# Patient Record
Sex: FEMALE | Race: WHITE | Employment: FULL TIME | URBAN - METROPOLITAN AREA
[De-identification: names, ages, dates, MRNs, and addresses within clinical notes are randomized per-mention and may not be internally consistent; named-entity substitution may affect disease eponyms.]

---

## 2017-01-17 PROBLEM — M19.049 CMC ARTHRITIS: Status: ACTIVE | Noted: 2017-01-17

## 2017-01-18 PROBLEM — M19.049 CMC DJD(CARPOMETACARPAL DEGENERATIVE JOINT DISEASE), LOCALIZED PRIMARY: Status: ACTIVE | Noted: 2017-01-18

## 2017-09-08 PROBLEM — G89.29 CHRONIC PAIN: Status: ACTIVE | Noted: 2017-09-08

## 2018-07-09 ENCOUNTER — HOSPITAL ENCOUNTER (OUTPATIENT)
Dept: GENERAL RADIOLOGY | Age: 53
Discharge: HOME | End: 2018-07-11
Payer: MEDICAID

## 2018-07-09 ENCOUNTER — HOSPITAL ENCOUNTER (OUTPATIENT)
Age: 53
Discharge: HOME | End: 2018-07-11
Payer: MEDICAID

## 2018-07-09 DIAGNOSIS — M25.512 LEFT SHOULDER PAIN, UNSPECIFIED CHRONICITY: ICD-10-CM

## 2018-07-09 PROCEDURE — 73030 X-RAY EXAM OF SHOULDER: CPT

## 2018-08-07 ENCOUNTER — HOSPITAL ENCOUNTER (OUTPATIENT)
Dept: GENERAL RADIOLOGY | Age: 53
Discharge: HOME | End: 2018-08-09
Payer: MEDICAID

## 2018-08-07 ENCOUNTER — HOSPITAL ENCOUNTER (OUTPATIENT)
Age: 53
Discharge: HOME | End: 2018-08-09
Payer: MEDICAID

## 2018-08-07 DIAGNOSIS — M25.541 ARTHRALGIA OF HAND, RIGHT: ICD-10-CM

## 2018-08-07 PROCEDURE — 73110 X-RAY EXAM OF WRIST: CPT

## 2018-08-07 PROCEDURE — 73130 X-RAY EXAM OF HAND: CPT

## 2018-08-16 ENCOUNTER — OFFICE VISIT (OUTPATIENT)
Dept: ORTHOPEDIC SURGERY | Age: 53
End: 2018-08-16
Payer: MEDICAID

## 2018-08-16 VITALS — WEIGHT: 166 LBS | BODY MASS INDEX: 29.41 KG/M2 | HEIGHT: 63 IN

## 2018-08-16 DIAGNOSIS — M19.031 PRIMARY OSTEOARTHRITIS OF RIGHT WRIST: Primary | ICD-10-CM

## 2018-08-16 DIAGNOSIS — M77.8 TENDONITIS OF WRIST, RIGHT: ICD-10-CM

## 2018-08-16 PROCEDURE — 1036F TOBACCO NON-USER: CPT | Performed by: ORTHOPAEDIC SURGERY

## 2018-08-16 PROCEDURE — 99214 OFFICE O/P EST MOD 30 MIN: CPT | Performed by: ORTHOPAEDIC SURGERY

## 2018-08-16 PROCEDURE — 3017F COLORECTAL CA SCREEN DOC REV: CPT | Performed by: ORTHOPAEDIC SURGERY

## 2018-08-16 PROCEDURE — G8427 DOCREV CUR MEDS BY ELIG CLIN: HCPCS | Performed by: ORTHOPAEDIC SURGERY

## 2018-08-16 PROCEDURE — G8417 CALC BMI ABV UP PARAM F/U: HCPCS | Performed by: ORTHOPAEDIC SURGERY

## 2018-08-16 RX ORDER — METHYLPREDNISOLONE 4 MG/1
4 TABLET ORAL SEE ADMIN INSTRUCTIONS
Qty: 1 KIT | Refills: 0 | Status: SHIPPED | OUTPATIENT
Start: 2018-08-16 | End: 2018-08-22

## 2018-08-16 NOTE — PROGRESS NOTES
SALPINGO-OOPHORECTOMY  July 2012    Attempted laparoscopy, open laparotomy, lysis of adhesions, and BSO       Current Outpatient Prescriptions:     methylPREDNISolone (MEDROL, TAMRA,) 4 MG tablet, Take 1 tablet by mouth See Admin Instructions for 6 days Take by mouth., Disp: 1 kit, Rfl: 0    amitriptyline (ELAVIL) 10 MG tablet, Take 10 mg by mouth nightly, Disp: , Rfl:   No Known Allergies  Social History     Social History    Marital status:      Spouse name: N/A    Number of children: N/A    Years of education: N/A     Occupational History    Not on file. Social History Main Topics    Smoking status: Never Smoker    Smokeless tobacco: Never Used    Alcohol use Yes      Comment: rare    Drug use: No    Sexual activity: Not on file     Other Topics Concern    Not on file     Social History Narrative    No narrative on file     No family history on file. REVIEW OF SYSTEMS:     General/Constitution:  (-)weight loss, (-)fever, (-)chills, (-)weakness. Skin: (-) rash,(-) psoriasis,(-) eczema, (-)skin cancer. Musculoskeletal: (-) fractures,  (-) dislocations,(-) collagen vascular disease, (-) fibromyalgia, (-) multiple sclerosis, (-) muscular dystrophy, (-) RSD,(-) joint pain (-)swelling, (-) joint pain,swelling. Neurologic: (-) epilepsy, (-)seizures,(-) brain tumor,(-) TIA, (-)stroke, (-)headaches, (-)Parkinson disease,(-) memory loss, (-) LOC. Cardiovascular: (-) Chest pain, (-) swelling in legs/feet, (-) SOB, (-) cramping in legs/feet with walking. Respiratory: (-) SOB, (-) Coughing, (-) night sweats. GI: (-) nausea, (-) vomiting, (-) diarrhea, (-) blood in stool, (-) gastric ulcer. Psychiatric: (-) Depression, (-) Anxiety, (-) bipolar disease, (-) Alzheimer's Disease  Allergic/Immunologic: (-) allergies latex, (-) allergies metal, (-) skin sensitivity.   Hematlogic: (-) anemia, (-) blood transfusion, (-) DVT/PE, (-) Clotting disorders      Subjective:    Constitution:  Ht 5' 3\" (1.6 m)

## 2018-08-22 ENCOUNTER — TELEPHONE (OUTPATIENT)
Dept: ORTHOPEDIC SURGERY | Age: 53
End: 2018-08-22

## 2018-08-27 RX ORDER — CEPHALEXIN 500 MG/1
2000 CAPSULE ORAL DAILY PRN
Qty: 4 CAPSULE | Refills: 0 | Status: SHIPPED | OUTPATIENT
Start: 2018-08-27 | End: 2019-03-19

## 2019-03-19 ENCOUNTER — APPOINTMENT (OUTPATIENT)
Dept: GENERAL RADIOLOGY | Age: 54
End: 2019-03-19
Payer: MEDICAID

## 2019-03-19 ENCOUNTER — HOSPITAL ENCOUNTER (EMERGENCY)
Age: 54
Discharge: HOME | End: 2019-03-19
Payer: MEDICAID

## 2019-03-19 VITALS
SYSTOLIC BLOOD PRESSURE: 120 MMHG | DIASTOLIC BLOOD PRESSURE: 62 MMHG | OXYGEN SATURATION: 98 % | RESPIRATION RATE: 20 BRPM | TEMPERATURE: 98 F | HEART RATE: 74 BPM | WEIGHT: 180 LBS | BODY MASS INDEX: 31.89 KG/M2

## 2019-03-19 DIAGNOSIS — M25.469 SUPRAPATELLAR EFFUSION OF KNEE: Primary | ICD-10-CM

## 2019-03-19 PROCEDURE — 73560 X-RAY EXAM OF KNEE 1 OR 2: CPT

## 2019-03-19 PROCEDURE — 99212 OFFICE O/P EST SF 10 MIN: CPT

## 2019-03-19 ASSESSMENT — PAIN SCALES - GENERAL: PAINLEVEL_OUTOF10: 8

## 2019-03-19 ASSESSMENT — PAIN DESCRIPTION - ORIENTATION: ORIENTATION: RIGHT

## 2019-03-19 ASSESSMENT — PAIN DESCRIPTION - LOCATION: LOCATION: KNEE

## 2019-03-19 ASSESSMENT — PAIN DESCRIPTION - PAIN TYPE: TYPE: ACUTE PAIN

## 2019-03-22 ENCOUNTER — HOSPITAL ENCOUNTER (OUTPATIENT)
Dept: MAMMOGRAPHY | Age: 54
Discharge: HOME | End: 2019-03-24
Payer: MEDICAID

## 2019-03-22 ENCOUNTER — HOSPITAL ENCOUNTER (OUTPATIENT)
Age: 54
Discharge: HOME | End: 2019-03-24
Payer: MEDICAID

## 2019-03-22 ENCOUNTER — HOSPITAL ENCOUNTER (OUTPATIENT)
Dept: GENERAL RADIOLOGY | Age: 54
Discharge: HOME | End: 2019-03-24
Payer: MEDICAID

## 2019-03-22 DIAGNOSIS — Z12.31 SCREENING MAMMOGRAM, ENCOUNTER FOR: ICD-10-CM

## 2019-03-22 DIAGNOSIS — M19.90 OSTEOARTHRITIS, UNSPECIFIED OSTEOARTHRITIS TYPE, UNSPECIFIED SITE: ICD-10-CM

## 2019-03-22 PROCEDURE — 72100 X-RAY EXAM L-S SPINE 2/3 VWS: CPT

## 2019-03-22 PROCEDURE — 77067 SCR MAMMO BI INCL CAD: CPT

## 2019-05-13 ENCOUNTER — APPOINTMENT (OUTPATIENT)
Dept: ULTRASOUND IMAGING | Age: 54
End: 2019-05-13
Payer: MEDICAID

## 2019-05-13 ENCOUNTER — HOSPITAL ENCOUNTER (EMERGENCY)
Age: 54
Discharge: HOME | End: 2019-05-13
Attending: EMERGENCY MEDICINE
Payer: MEDICAID

## 2019-05-13 ENCOUNTER — APPOINTMENT (OUTPATIENT)
Dept: GENERAL RADIOLOGY | Age: 54
End: 2019-05-13
Payer: MEDICAID

## 2019-05-13 VITALS
DIASTOLIC BLOOD PRESSURE: 61 MMHG | OXYGEN SATURATION: 96 % | RESPIRATION RATE: 16 BRPM | HEART RATE: 72 BPM | TEMPERATURE: 98.2 F | SYSTOLIC BLOOD PRESSURE: 121 MMHG

## 2019-05-13 DIAGNOSIS — M79.89 LEG SWELLING: ICD-10-CM

## 2019-05-13 DIAGNOSIS — M79.604 PAIN OF RIGHT LOWER EXTREMITY: Primary | ICD-10-CM

## 2019-05-13 PROCEDURE — 93971 EXTREMITY STUDY: CPT

## 2019-05-13 PROCEDURE — 99283 EMERGENCY DEPT VISIT LOW MDM: CPT

## 2019-05-13 PROCEDURE — 73560 X-RAY EXAM OF KNEE 1 OR 2: CPT

## 2019-05-13 RX ORDER — IBUPROFEN 800 MG/1
800 TABLET ORAL 2 TIMES DAILY PRN
Qty: 30 TABLET | Refills: 0 | Status: SHIPPED | OUTPATIENT
Start: 2019-05-13 | End: 2019-11-07

## 2019-05-13 ASSESSMENT — ENCOUNTER SYMPTOMS
TROUBLE SWALLOWING: 0
EYE REDNESS: 0
BACK PAIN: 0
VOMITING: 0
COUGH: 0
SORE THROAT: 0
NAUSEA: 0
ABDOMINAL PAIN: 0
DIARRHEA: 0
SHORTNESS OF BREATH: 0

## 2019-05-13 ASSESSMENT — PAIN DESCRIPTION - LOCATION: LOCATION: LEG

## 2019-05-13 ASSESSMENT — PAIN SCALES - GENERAL: PAINLEVEL_OUTOF10: 5

## 2019-05-13 ASSESSMENT — PAIN DESCRIPTION - ORIENTATION: ORIENTATION: RIGHT;INNER

## 2019-05-13 ASSESSMENT — PAIN DESCRIPTION - PAIN TYPE: TYPE: ACUTE PAIN

## 2019-05-14 NOTE — ED PROVIDER NOTES
The patient is a 46year-old-female who presents to the Emergency Department complaining of right leg pain that has persisted for the past several days. Patient has a history of 3 knee replacements in the past on this knee, with last one being in 2010. She has been experiencing throbbing pain on this leg for the past several days. She states it is worse with movement when she has to lift the leg. She denies any injury, trauma, fall, fever, chills, history of blood clots, history of bleeding disorders, recent surgeries, recent hospitalizations, recent travel, hormone use, or other acute symptoms or concerns. The history is provided by the patient. Leg Injury   Location:  Leg  Injury: no    Leg location:  R leg  Pain details:     Quality:  Throbbing    Radiates to:  Does not radiate    Severity:  Moderate    Onset quality:  Gradual    Timing:  Constant    Progression:  Unchanged  Chronicity:  New  Dislocation: no    Foreign body present:  No foreign bodies  Tetanus status:  Unknown  Prior injury to area:  Yes  Relieved by:  Nothing  Worsened by: Activity  Ineffective treatments: Aleve. Associated symptoms: swelling    Associated symptoms: no back pain, no decreased ROM, no fatigue, no fever, no numbness, no stiffness and no tingling    Risk factors: no concern for non-accidental trauma, no frequent fractures, no obesity and no recent illness        Review of Systems   Constitutional: Negative for chills, fatigue and fever. HENT: Negative for sore throat and trouble swallowing. Eyes: Negative for redness and visual disturbance. Respiratory: Negative for cough and shortness of breath. Cardiovascular: Negative for chest pain, palpitations and leg swelling. Gastrointestinal: Negative for abdominal pain, diarrhea, nausea and vomiting. Genitourinary: Negative for dysuria and flank pain. Musculoskeletal: Positive for joint swelling. Negative for back pain and stiffness.         Right leg pain and swelling     Skin: Negative for rash and wound. Neurological: Negative for syncope, weakness, numbness and headaches. All other systems reviewed and are negative. Physical Exam   Constitutional: She is oriented to person, place, and time. She appears well-developed and well-nourished. HENT:   Head: Normocephalic and atraumatic. Eyes: Conjunctivae and EOM are normal.   Neck: Normal range of motion. Neck supple. Cardiovascular: Normal rate, regular rhythm and normal heart sounds. No murmur heard. Pulmonary/Chest: Effort normal and breath sounds normal. No respiratory distress. She has no wheezes. She has no rales. Abdominal: Soft. Bowel sounds are normal. There is no tenderness. There is no rebound and no guarding. Musculoskeletal: She exhibits edema (mild edema to the RLE) and tenderness (mild tenderness to palpation over the right medial superior tibia). She exhibits no deformity. Well healed scar   Neurological: She is alert and oriented to person, place, and time. No cranial nerve deficit. Coordination normal.   Skin: Skin is warm and dry. Nursing note and vitals reviewed. Procedures    MDM  Number of Diagnoses or Management Options  Pain of right lower extremity:   Diagnosis management comments: The patient is a 58-year-old female presents to the emergency department complaining of right leg pain and swelling. There is no tenderness to palpation over the medial aspect of the right knee, with mild edema. She has full range of motion of the knee and is neurologically intact. Bilateral peripheral pulses are equal. We'll proceed with x-ray right knee and lower extremity doppler. Offerred the patient pain medication, however, she would like to hold off at this time. Reassessed patient and discussed test results. Too Hernandez shows the hardware is in good position and the ultrasound does not show any evidence of DVT. Will ace wrap patient's knee and provide a prescription for Ibuprofen. Recommended patient to follow up with her orthopedic surgeon this week. She verbalized understanding and agreement to plan of care and discharge instructions.             --------------------------------------------- PAST HISTORY ---------------------------------------------  Past Medical History:  has a past medical history of Arthritis, Back pain, Benign neoplasm of ovary, Blood transfusion, Fibromyalgia, Lupus, Pelvic peritoneal adhesions, female (postoperative) (postinfection), Right leg pain, Seizure ([facility] [address] Unspecified symptom associated with female genital organs, and Ureteral obstruction. Past Surgical History:  has a past surgical history that includes joint replacement (Right, pt had 3 knee replacements); Breast surgery (lt breast lumpectomy); Hysterectomy (partial hysterectomy); Foot surgery (rt foot spur);  section (2 c sections); Kidney surgery (ureteral obstruction as child); Abdominal adhesion surgery (aug 2011); Salpingo-oophorectomy (2012); back surgery (); Carpal tunnel release (Right, 2014); Knee arthroscopy (Left, 11/05/15); Carpal tunnel release (Left, 2016); other surgical history (Right, 2017); and other surgical history (Left, 2017). Social History:  reports that she has never smoked. She has never used smokeless tobacco. She reports that she drinks alcohol. She reports that she does not use drugs. Family History: family history is not on file. The patients home medications have been reviewed. Allergies: Patient has no known allergies. -------------------------------------------------- RESULTS -------------------------------------------------  Labs:  No results found for this visit on 19. Radiology:  US DUP LOWER EXTREMITY RIGHT JUANITO   Final Result   No evidence of acute deep venous thrombosis. XR KNEE RIGHT (1-2 VIEWS)   Final Result   1. Status post total right knee arthroplasty.  There is no fracture or   hardware pain and swelling x 4 days, denies injury)    I have reviewed and discussed the case, including pertinent history (medical, surgical, family and social) and exam findings with the Resident and the Nurse assigned to Real Du.  I have personally performed and/or participated in the history, exam, medical decision making, and procedures and agree with all pertinent clinical information. I have reviewed my findings and recommendations with Real Du and members of family present at the time of disposition. My findings/plan: The primary encounter diagnosis was Pain of right lower extremity. A diagnosis of Leg swelling was also pertinent to this visit.   Discharge Medication List as of 5/13/2019  9:52 PM      START taking these medications    Details   ibuprofen (ADVIL;MOTRIN) 800 MG tablet Take 1 tablet by mouth 2 times daily as needed for Pain, Disp-30 tablet, R-0Print           DO Idalia Ramirez DO  05/14/19 0221

## 2019-06-24 ENCOUNTER — HOSPITAL ENCOUNTER (EMERGENCY)
Age: 54
Discharge: HOME | End: 2019-06-24
Payer: MEDICAID

## 2019-06-24 VITALS
BODY MASS INDEX: 32.95 KG/M2 | OXYGEN SATURATION: 97 % | SYSTOLIC BLOOD PRESSURE: 106 MMHG | HEART RATE: 70 BPM | DIASTOLIC BLOOD PRESSURE: 60 MMHG | RESPIRATION RATE: 16 BRPM | TEMPERATURE: 98.3 F | WEIGHT: 186 LBS

## 2019-06-24 DIAGNOSIS — J03.90 ACUTE TONSILLITIS, UNSPECIFIED ETIOLOGY: Primary | ICD-10-CM

## 2019-06-24 LAB
MONO TEST: NEGATIVE
STREP GRP A PCR: NEGATIVE

## 2019-06-24 PROCEDURE — 36415 COLL VENOUS BLD VENIPUNCTURE: CPT

## 2019-06-24 PROCEDURE — 87880 STREP A ASSAY W/OPTIC: CPT

## 2019-06-24 PROCEDURE — 86308 HETEROPHILE ANTIBODY SCREEN: CPT

## 2019-06-24 PROCEDURE — 99212 OFFICE O/P EST SF 10 MIN: CPT

## 2019-06-24 RX ORDER — IBUPROFEN 200 MG
600 TABLET ORAL EVERY 6 HOURS PRN
COMMUNITY
End: 2020-07-21

## 2019-06-24 RX ORDER — AMOXICILLIN 500 MG/1
500 CAPSULE ORAL 3 TIMES DAILY
Qty: 30 CAPSULE | Refills: 0 | Status: SHIPPED | OUTPATIENT
Start: 2019-06-24 | End: 2019-07-04

## 2019-06-24 ASSESSMENT — PAIN SCALES - GENERAL: PAINLEVEL_OUTOF10: 10

## 2019-06-24 ASSESSMENT — PAIN DESCRIPTION - ORIENTATION: ORIENTATION: RIGHT;UPPER

## 2019-06-24 ASSESSMENT — PAIN DESCRIPTION - LOCATION: LOCATION: LEG

## 2019-06-24 NOTE — LETTER
[facility] [address] [place] [address]  Phone: [phone]               June 24, 2019    Patient: Anna Floyd   YOB: 1965   Date of Visit: 6/24/2019       To Whom It May Concern: Alisha Zamora was seen and treated in our emergency department on 6/24/2019. She was absent from work on 6.24 and 6/25 due to illness.       Sincerely,       RUFUS Katz CNP         Signature:__________________________________

## 2019-06-25 NOTE — ED PROVIDER NOTES
Department of Emergency Medicine   [address] [place] [address]  Provider Note  Admit Date/RoomTime: 6/24/2019  5:31 PM  Room: 04/04  Chief Complaint   Fever (102 today); Pharyngitis; Leg Pain (pain to right upper leg since mowing the grass on saturday); and Fatigue (no energy)    History of Present Illness   Source of history provided by:  patient. History/Exam Limitations: none. Troy Bell is a 46 y.o. old female who has a past medical history of:   Past Medical History:   Diagnosis Date    Arthritis arthritis back    and legs,     Back pain arthritis    Benign neoplasm of ovary 7/16/2012    Blood transfusion     Fibromyalgia     Lupus (HCC)     questionable    Pelvic peritoneal adhesions, female (postoperative) (postinfection) 7/16/2012    Right leg pain     Seizure ([facility] [address]     last one 30 yrs ago- h/o epilepsy as a child    Unspecified symptom associated with female genital organs 7/16/2012    Ureteral obstruction as a child had surgery to correct     presents to the urgent care center by private vehicle, for bilateral sore throat pain, which occured 2 day(s) prior to arrival. She has had a fever and has been fatigued. 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 Her temperatures been as high as 102. Exposed To: Streptococcus: yes. Infectious Mononucleosis:  unknown. Symptoms:  Pain:  Yes. Muffled Voice:  No.                            Hoarse:  No.                            Difficulty with Secretions:  No.        ROS    Pertinent positives and negatives are stated within HPI, all other systems reviewed and are negative. Past Surgical History:  has a past surgical history that includes joint replacement (Right, pt had 3 knee replacements); Breast surgery (lt breast lumpectomy); Hysterectomy (partial hysterectomy);  Foot surgery (rt foot spur);  section (2 c sections); Kidney surgery (ureteral obstruction as child); Abdominal adhesion surgery (aug 2011); Salpingo-oophorectomy (2012); back surgery (); Carpal tunnel release (Right, 2014); Knee arthroscopy (Left, 11/05/15); Carpal tunnel release (Left, 2016); other surgical history (Right, 2017); and other surgical history (Left, 2017). Social History:  reports that she has never smoked. She has never used smokeless tobacco. She reports that she drinks alcohol. She reports that she does not use drugs. Family History: family history is not on file. Allergies: Patient has no known allergies. Physical Exam           ED Triage Vitals [19 1735]   BP Temp Temp Source Pulse Resp SpO2 Height Weight   96/65 98.3 °F (36.8 °C) Oral 70 16 97 % -- 186 lb (84.4 kg)     Oxygen Saturation Interpretation: Normal.    Constitutional:  Alert, development consistent with age. .  Ears:  TMs without perforation, injection, or bulging. External canals clear without exudate. Nose:   There is no discharge, swelling or lesions noted. Throat: Airway Patent. marked erythema, tonsillar hypertrophy, 2+ and exudates present. Neck/Lymphatic:  Neck Supple. There is Bilateral  anterior cervical node tenderness. respiratory:  Clear to auscultation and breath sounds equal.    CV: Regular rate and rhythm, normal heart sounds, without pathological murmurs, ectopy, gallops, or rubs. GI:  Abdomen Soft, nontender, good bowel sounds. No firm or pulsatile mass. Inegument:  No rashes, erythema present. Neurological:  Oriented. Motor functions intact.     Lab / Imaging Results   (All laboratory and radiology results have been personally reviewed by myself)  Labs:  Results for orders placed or performed during the hospital encounter of 19   Strep Screen Group A Throat   Result Value Ref Range    Strep Grp A PCR Negative Negative   Mononucleosis Screen   Result Value Ref Range Mono Test Negative Negative     Imaging: All Radiology results interpreted by Radiologist unless otherwise noted. No orders to display       ED Course / Medical Decision Making   Medications - No data to display       MDM:    Based on high probability for Strep as per history/physical findings, Rapid Strep/Throat Culture testing was done and confirms the above findings. She has exudates and tonsillar enlargement-- I did check for mono. It was also negative. Due to her exam and exposure and fever, I did put her on amoxicillin      Plan of Care: Normal progression of disease discussed. All questions answered. Instruction provided in the use of fluids, vaporizer, acetaminophen, and other OTC medication for symptom control. Extra fluids  Analgesics as needed, dose reviewed. Follow up as needed should symptoms fail to improve. Counseling:    I have  spoken with the patient and discussed todays results, in addition to providing specific details for the plan of care and counseling regarding the diagnosis and prognosis. Questions are answered at this time and they are agreeable with the plan. Assessment     1. Acute tonsillitis, unspecified etiology      Plan   Discharge to home and advised to contact Edgardo Núñez MD  [facility] [address] 36       As needed   Patient condition is good    New Medications     Discharge Medication List as of 6/24/2019  7:12 PM      START taking these medications    Details   amoxicillin (AMOXIL) 500 MG capsule Take 1 capsule by mouth 3 times daily for 10 days, Disp-30 capsule, R-0Print           Electronically signed by RUFUS Freeman CNP   DD: 6/25/19  **This report was transcribed using voice recognition software. Every effort was made to ensure accuracy; however, inadvertent computerized transcription errors may be present.   END OF ED PROVIDER NOTE       RUFUS Freeman CNP  06/25/19 [id number]

## 2019-08-30 ENCOUNTER — HOSPITAL ENCOUNTER (EMERGENCY)
Age: 54
Discharge: HOME | End: 2019-08-30
Payer: MEDICAID

## 2019-08-30 VITALS
DIASTOLIC BLOOD PRESSURE: 75 MMHG | BODY MASS INDEX: 32.95 KG/M2 | OXYGEN SATURATION: 96 % | RESPIRATION RATE: 16 BRPM | HEART RATE: 95 BPM | SYSTOLIC BLOOD PRESSURE: 112 MMHG | TEMPERATURE: 98.2 F | WEIGHT: 186 LBS

## 2019-08-30 DIAGNOSIS — R51.9 ACUTE NONINTRACTABLE HEADACHE, UNSPECIFIED HEADACHE TYPE: Primary | ICD-10-CM

## 2019-08-30 PROCEDURE — 96372 THER/PROPH/DIAG INJ SC/IM: CPT

## 2019-08-30 PROCEDURE — 6370000000 HC RX 637 (ALT 250 FOR IP): Performed by: NURSE PRACTITIONER

## 2019-08-30 PROCEDURE — 99212 OFFICE O/P EST SF 10 MIN: CPT

## 2019-08-30 PROCEDURE — 6360000002 HC RX W HCPCS: Performed by: NURSE PRACTITIONER

## 2019-08-30 RX ORDER — METOCLOPRAMIDE 5 MG/1
10 TABLET ORAL ONCE
Status: COMPLETED | OUTPATIENT
Start: 2019-08-30 | End: 2019-08-30

## 2019-08-30 RX ORDER — DIPHENHYDRAMINE HCL 25 MG
25 TABLET ORAL ONCE
Status: COMPLETED | OUTPATIENT
Start: 2019-08-30 | End: 2019-08-30

## 2019-08-30 RX ORDER — KETOROLAC TROMETHAMINE 30 MG/ML
30 INJECTION, SOLUTION INTRAMUSCULAR; INTRAVENOUS ONCE
Status: COMPLETED | OUTPATIENT
Start: 2019-08-30 | End: 2019-08-30

## 2019-08-30 RX ADMIN — METOCLOPRAMIDE HYDROCHLORIDE 10 MG: 5 TABLET ORAL at 17:42

## 2019-08-30 RX ADMIN — DIPHENHYDRAMINE HCL 25 MG: 25 TABLET ORAL at 17:42

## 2019-08-30 RX ADMIN — KETOROLAC TROMETHAMINE 30 MG: 30 INJECTION, SOLUTION INTRAMUSCULAR at 17:42

## 2019-08-30 ASSESSMENT — PAIN DESCRIPTION - DESCRIPTORS
DESCRIPTORS: HEADACHE

## 2019-08-30 ASSESSMENT — PAIN SCALES - GENERAL
PAINLEVEL_OUTOF10: 4
PAINLEVEL_OUTOF10: 2
PAINLEVEL_OUTOF10: 5
PAINLEVEL_OUTOF10: 5

## 2019-08-30 ASSESSMENT — PAIN DESCRIPTION - LOCATION
LOCATION: HEAD

## 2019-08-30 ASSESSMENT — PAIN DESCRIPTION - PROGRESSION
CLINICAL_PROGRESSION: GRADUALLY IMPROVING
CLINICAL_PROGRESSION: GRADUALLY IMPROVING

## 2019-08-30 NOTE — ED PROVIDER NOTES
She has never used smokeless tobacco. She reports that she drinks alcohol. She reports that she does not use drugs. Family History: family history is not on file. The patients home medications have been reviewed. Allergies: Patient has no known allergies. -------------------------------------------------- RESULTS -------------------------------------------------  All laboratory and radiology results have been personally reviewed by myself   LABS:  No results found for this visit on 08/30/19. RADIOLOGY:  Interpreted by Radiologist.  No orders to display       ------------------------- NURSING NOTES AND VITALS REVIEWED ---------------------------   The nursing notes within the ED encounter and vital signs as below have been reviewed. /75   Pulse 95   Temp 98.2 °F (36.8 °C) (Oral)   Resp 16   Wt 186 lb (84.4 kg)   LMP 06/20/2012   SpO2 96%   BMI 32.95 kg/m²   Oxygen Saturation Interpretation: Normal      ---------------------------------------------------PHYSICAL EXAM--------------------------------------      Constitutional/General: Alert and oriented x3, well appearing, non toxic in NAD  Head: Normocephalic and atraumatic  Eyes: PERRL, EOMI  Mouth: Oropharynx clear, handling secretions, no trismus  Neck: Supple, full ROM,   Pulmonary: Lungs clear to auscultation bilaterally, no wheezes, rales, or rhonchi. Not in respiratory distress  Cardiovascular:  Regular rate and rhythm, no murmurs, gallops, or rubs. 2+ distal pulses  Abdomen: Soft, non tender, non distended,   Extremities: Moves all extremities x 4.  Warm and well perfused  Skin: warm and dry without rash  Neurologic: GCS 15,  Psych: Normal Affect      ------------------------------ ED COURSE/MEDICAL DECISION MAKING----------------------  Medications   ketorolac (TORADOL) injection 30 mg (30 mg Intramuscular Given 8/30/19 1742)   metoclopramide (REGLAN) tablet 10 mg (10 mg Oral Given 8/30/19 1742)   diphenhydrAMINE (BENADRYL)

## 2019-10-23 ENCOUNTER — TELEPHONE (OUTPATIENT)
Dept: ORTHOPEDIC SURGERY | Age: 54
End: 2019-10-23

## 2019-10-23 RX ORDER — CEPHALEXIN 500 MG/1
2000 CAPSULE ORAL DAILY PRN
Qty: 4 CAPSULE | Refills: 0 | Status: SHIPPED | OUTPATIENT
Start: 2019-10-23 | End: 2019-11-07

## 2019-10-29 ENCOUNTER — TELEPHONE (OUTPATIENT)
Dept: ORTHOPEDIC SURGERY | Age: 54
End: 2019-10-29

## 2019-10-29 RX ORDER — CEPHALEXIN 500 MG/1
2000 CAPSULE ORAL DAILY PRN
Qty: 4 CAPSULE | Refills: 5 | Status: SHIPPED | OUTPATIENT
Start: 2019-10-29 | End: 2019-11-07

## 2019-11-05 ENCOUNTER — FOLLOWUP TELEPHONE ENCOUNTER (OUTPATIENT)
Dept: AUDIOLOGY | Age: 54
End: 2019-11-05

## 2019-11-07 ENCOUNTER — HOSPITAL ENCOUNTER (OUTPATIENT)
Age: 54
Setting detail: OBSERVATION
Discharge: HOME | End: 2019-11-09
Attending: EMERGENCY MEDICINE | Admitting: INTERNAL MEDICINE
Payer: COMMERCIAL

## 2019-11-07 ENCOUNTER — APPOINTMENT (OUTPATIENT)
Dept: ULTRASOUND IMAGING | Age: 54
End: 2019-11-07
Payer: COMMERCIAL

## 2019-11-07 ENCOUNTER — APPOINTMENT (OUTPATIENT)
Dept: CT IMAGING | Age: 54
End: 2019-11-07
Payer: COMMERCIAL

## 2019-11-07 DIAGNOSIS — G45.9 TIA (TRANSIENT ISCHEMIC ATTACK): Primary | ICD-10-CM

## 2019-11-07 LAB
ALBUMIN SERPL-MCNC: 4.3 G/DL (ref 3.5–5.2)
ALP BLD-CCNC: 76 U/L (ref 35–104)
ALT SERPL-CCNC: 15 U/L (ref 0–32)
ANION GAP SERPL CALCULATED.3IONS-SCNC: 12 MMOL/L (ref 7–16)
AST SERPL-CCNC: 24 U/L (ref 0–31)
BASOPHILS ABSOLUTE: 0.02 E9/L (ref 0–0.2)
BASOPHILS RELATIVE PERCENT: 0.4 % (ref 0–2)
BILIRUB SERPL-MCNC: 0.3 MG/DL (ref 0–1.2)
BUN BLDV-MCNC: 18 MG/DL (ref 6–20)
CALCIUM SERPL-MCNC: 10 MG/DL (ref 8.6–10.2)
CHLORIDE BLD-SCNC: 102 MMOL/L (ref 98–107)
CO2: 27 MMOL/L (ref 22–29)
CREAT SERPL-MCNC: 0.6 MG/DL (ref 0.5–1)
EOSINOPHILS ABSOLUTE: 0.17 E9/L (ref 0.05–0.5)
EOSINOPHILS RELATIVE PERCENT: 3.1 % (ref 0–6)
GFR AFRICAN AMERICAN: >60
GFR NON-AFRICAN AMERICAN: >60 ML/MIN/1.73
GLUCOSE BLD-MCNC: 86 MG/DL (ref 74–99)
HCT VFR BLD CALC: 35.8 % (ref 34–48)
HEMOGLOBIN: 11.9 G/DL (ref 11.5–15.5)
IMMATURE GRANULOCYTES #: 0.01 E9/L
IMMATURE GRANULOCYTES %: 0.2 % (ref 0–5)
LYMPHOCYTES ABSOLUTE: 1.76 E9/L (ref 1.5–4)
LYMPHOCYTES RELATIVE PERCENT: 32.2 % (ref 20–42)
MCH RBC QN AUTO: 30.1 PG (ref 26–35)
MCHC RBC AUTO-ENTMCNC: 33.2 % (ref 32–34.5)
MCV RBC AUTO: 90.4 FL (ref 80–99.9)
MONOCYTES ABSOLUTE: 0.55 E9/L (ref 0.1–0.95)
MONOCYTES RELATIVE PERCENT: 10.1 % (ref 2–12)
NEUTROPHILS ABSOLUTE: 2.95 E9/L (ref 1.8–7.3)
NEUTROPHILS RELATIVE PERCENT: 54 % (ref 43–80)
PDW BLD-RTO: 13.9 FL (ref 11.5–15)
PLATELET # BLD: 228 E9/L (ref 130–450)
PMV BLD AUTO: 10.4 FL (ref 7–12)
POTASSIUM SERPL-SCNC: 4.1 MMOL/L (ref 3.5–5)
RBC # BLD: 3.96 E12/L (ref 3.5–5.5)
SODIUM BLD-SCNC: 141 MMOL/L (ref 132–146)
TOTAL PROTEIN: 7.7 G/DL (ref 6.4–8.3)
WBC # BLD: 5.5 E9/L (ref 4.5–11.5)

## 2019-11-07 PROCEDURE — 93880 EXTRACRANIAL BILAT STUDY: CPT

## 2019-11-07 PROCEDURE — 6370000000 HC RX 637 (ALT 250 FOR IP): Performed by: STUDENT IN AN ORGANIZED HEALTH CARE EDUCATION/TRAINING PROGRAM

## 2019-11-07 PROCEDURE — 2580000003 HC RX 258

## 2019-11-07 PROCEDURE — 2580000003 HC RX 258: Performed by: INTERNAL MEDICINE

## 2019-11-07 PROCEDURE — G0378 HOSPITAL OBSERVATION PER HR: HCPCS

## 2019-11-07 PROCEDURE — 99220 PR INITIAL OBSERVATION CARE/DAY 70 MINUTES: CPT | Performed by: INTERNAL MEDICINE

## 2019-11-07 PROCEDURE — 36415 COLL VENOUS BLD VENIPUNCTURE: CPT

## 2019-11-07 PROCEDURE — 6370000000 HC RX 637 (ALT 250 FOR IP): Performed by: INTERNAL MEDICINE

## 2019-11-07 PROCEDURE — 85025 COMPLETE CBC W/AUTO DIFF WBC: CPT

## 2019-11-07 PROCEDURE — 99285 EMERGENCY DEPT VISIT HI MDM: CPT

## 2019-11-07 PROCEDURE — 70450 CT HEAD/BRAIN W/O DYE: CPT

## 2019-11-07 PROCEDURE — 80053 COMPREHEN METABOLIC PANEL: CPT

## 2019-11-07 RX ORDER — SODIUM CHLORIDE 0.9 % (FLUSH) 0.9 %
10 SYRINGE (ML) INJECTION PRN
Status: DISCONTINUED | OUTPATIENT
Start: 2019-11-07 | End: 2019-11-09 | Stop reason: HOSPADM

## 2019-11-07 RX ORDER — ASPIRIN 81 MG/1
324 TABLET, CHEWABLE ORAL ONCE
Status: COMPLETED | OUTPATIENT
Start: 2019-11-07 | End: 2019-11-07

## 2019-11-07 RX ORDER — SODIUM CHLORIDE 0.9 % (FLUSH) 0.9 %
10 SYRINGE (ML) INJECTION EVERY 12 HOURS SCHEDULED
Status: DISCONTINUED | OUTPATIENT
Start: 2019-11-07 | End: 2019-11-09 | Stop reason: HOSPADM

## 2019-11-07 RX ORDER — ONDANSETRON 2 MG/ML
4 INJECTION INTRAMUSCULAR; INTRAVENOUS EVERY 6 HOURS PRN
Status: DISCONTINUED | OUTPATIENT
Start: 2019-11-07 | End: 2019-11-09 | Stop reason: HOSPADM

## 2019-11-07 RX ORDER — LANOLIN ALCOHOL/MO/W.PET/CERES
4.5 CREAM (GRAM) TOPICAL NIGHTLY PRN
Status: DISCONTINUED | OUTPATIENT
Start: 2019-11-07 | End: 2019-11-09 | Stop reason: HOSPADM

## 2019-11-07 RX ORDER — ACETAMINOPHEN 325 MG/1
650 TABLET ORAL EVERY 4 HOURS PRN
Status: DISCONTINUED | OUTPATIENT
Start: 2019-11-07 | End: 2019-11-09 | Stop reason: HOSPADM

## 2019-11-07 RX ORDER — SODIUM CHLORIDE 0.9 % (FLUSH) 0.9 %
SYRINGE (ML) INJECTION
Status: COMPLETED
Start: 2019-11-07 | End: 2019-11-07

## 2019-11-07 RX ORDER — ACETAMINOPHEN 325 MG/1
650 TABLET ORAL ONCE
Status: COMPLETED | OUTPATIENT
Start: 2019-11-07 | End: 2019-11-07

## 2019-11-07 RX ORDER — ASPIRIN 81 MG/1
81 TABLET, CHEWABLE ORAL DAILY
Status: DISCONTINUED | OUTPATIENT
Start: 2019-11-08 | End: 2019-11-09 | Stop reason: HOSPADM

## 2019-11-07 RX ORDER — LANOLIN ALCOHOL/MO/W.PET/CERES
4.5 CREAM (GRAM) TOPICAL NIGHTLY PRN
Status: DISCONTINUED | OUTPATIENT
Start: 2019-11-08 | End: 2019-11-07

## 2019-11-07 RX ORDER — CHOLECALCIFEROL (VITAMIN D3) 125 MCG
5 CAPSULE ORAL NIGHTLY PRN
Status: DISCONTINUED | OUTPATIENT
Start: 2019-11-08 | End: 2019-11-07 | Stop reason: CLARIF

## 2019-11-07 RX ADMIN — Medication 10 ML: at 16:55

## 2019-11-07 RX ADMIN — MELATONIN 4.5 MG: at 23:46

## 2019-11-07 RX ADMIN — Medication 10 ML: at 21:51

## 2019-11-07 RX ADMIN — ASPIRIN 81 MG 324 MG: 81 TABLET ORAL at 18:02

## 2019-11-07 RX ADMIN — ACETAMINOPHEN 650 MG: 325 TABLET, FILM COATED ORAL at 17:49

## 2019-11-07 ASSESSMENT — ENCOUNTER SYMPTOMS
ABDOMINAL PAIN: 0
COUGH: 0
NAUSEA: 0
VOMITING: 0
VISUAL CHANGE: 0
WHEEZING: 0
DIARRHEA: 0
PHOTOPHOBIA: 0
CHEST TIGHTNESS: 0
BLURRED VISION: 0
SHORTNESS OF BREATH: 0

## 2019-11-07 ASSESSMENT — PAIN SCALES - GENERAL
PAINLEVEL_OUTOF10: 6
PAINLEVEL_OUTOF10: 0

## 2019-11-08 ENCOUNTER — APPOINTMENT (OUTPATIENT)
Dept: MRI IMAGING | Age: 54
End: 2019-11-08
Payer: COMMERCIAL

## 2019-11-08 ENCOUNTER — TELEPHONE (OUTPATIENT)
Dept: AUDIOLOGY | Age: 54
End: 2019-11-08

## 2019-11-08 PROBLEM — M79.7 FIBROMYALGIA: Status: ACTIVE | Noted: 2019-11-08

## 2019-11-08 PROBLEM — R56.9 SEIZURE (HCC): Status: ACTIVE | Noted: 2019-11-08

## 2019-11-08 LAB
ALBUMIN SERPL-MCNC: 3.9 G/DL (ref 3.5–5.2)
ALP BLD-CCNC: 64 U/L (ref 35–104)
ALT SERPL-CCNC: 13 U/L (ref 0–32)
ANION GAP SERPL CALCULATED.3IONS-SCNC: 12 MMOL/L (ref 7–16)
AST SERPL-CCNC: 22 U/L (ref 0–31)
BASOPHILS ABSOLUTE: 0.02 E9/L (ref 0–0.2)
BASOPHILS RELATIVE PERCENT: 0.4 % (ref 0–2)
BILIRUB SERPL-MCNC: 0.4 MG/DL (ref 0–1.2)
BUN BLDV-MCNC: 19 MG/DL (ref 6–20)
C-REACTIVE PROTEIN: 1.1 MG/DL (ref 0–0.4)
CALCIUM SERPL-MCNC: 9.5 MG/DL (ref 8.6–10.2)
CHLORIDE BLD-SCNC: 105 MMOL/L (ref 98–107)
CHOLESTEROL, TOTAL: 200 MG/DL (ref 0–199)
CO2: 24 MMOL/L (ref 22–29)
CREAT SERPL-MCNC: 0.7 MG/DL (ref 0.5–1)
EKG ATRIAL RATE: 60 BPM
EKG P AXIS: 29 DEGREES
EKG P-R INTERVAL: 130 MS
EKG Q-T INTERVAL: 460 MS
EKG QRS DURATION: 80 MS
EKG QTC CALCULATION (BAZETT): 460 MS
EKG R AXIS: 71 DEGREES
EKG T AXIS: 72 DEGREES
EKG VENTRICULAR RATE: 60 BPM
EOSINOPHILS ABSOLUTE: 0.22 E9/L (ref 0.05–0.5)
EOSINOPHILS RELATIVE PERCENT: 3.9 % (ref 0–6)
GFR AFRICAN AMERICAN: >60
GFR NON-AFRICAN AMERICAN: >60 ML/MIN/1.73
GLUCOSE BLD-MCNC: 88 MG/DL (ref 74–99)
HBA1C MFR BLD: 5.3 % (ref 4–5.6)
HCT VFR BLD CALC: 33.5 % (ref 34–48)
HDLC SERPL-MCNC: 77 MG/DL
HEMOGLOBIN: 11.3 G/DL (ref 11.5–15.5)
IMMATURE GRANULOCYTES #: 0.01 E9/L
IMMATURE GRANULOCYTES %: 0.2 % (ref 0–5)
LDL CHOLESTEROL CALCULATED: 105 MG/DL (ref 0–99)
LYMPHOCYTES ABSOLUTE: 1.63 E9/L (ref 1.5–4)
LYMPHOCYTES RELATIVE PERCENT: 29.3 % (ref 20–42)
MCH RBC QN AUTO: 30.1 PG (ref 26–35)
MCHC RBC AUTO-ENTMCNC: 33.7 % (ref 32–34.5)
MCV RBC AUTO: 89.1 FL (ref 80–99.9)
MONOCYTES ABSOLUTE: 0.6 E9/L (ref 0.1–0.95)
MONOCYTES RELATIVE PERCENT: 10.8 % (ref 2–12)
NEUTROPHILS ABSOLUTE: 3.09 E9/L (ref 1.8–7.3)
NEUTROPHILS RELATIVE PERCENT: 55.4 % (ref 43–80)
PDW BLD-RTO: 14 FL (ref 11.5–15)
PLATELET # BLD: 227 E9/L (ref 130–450)
PMV BLD AUTO: 10.3 FL (ref 7–12)
POTASSIUM SERPL-SCNC: 3.8 MMOL/L (ref 3.5–5)
RBC # BLD: 3.76 E12/L (ref 3.5–5.5)
SEDIMENTATION RATE, ERYTHROCYTE: 20 MM/HR (ref 0–20)
SODIUM BLD-SCNC: 141 MMOL/L (ref 132–146)
T4 FREE: 1.2 NG/DL (ref 0.93–1.7)
TOTAL PROTEIN: 7 G/DL (ref 6.4–8.3)
TRIGL SERPL-MCNC: 89 MG/DL (ref 0–149)
TSH SERPL DL<=0.05 MIU/L-ACNC: 3.8 UIU/ML (ref 0.27–4.2)
VLDLC SERPL CALC-MCNC: 18 MG/DL
WBC # BLD: 5.6 E9/L (ref 4.5–11.5)

## 2019-11-08 PROCEDURE — 70551 MRI BRAIN STEM W/O DYE: CPT

## 2019-11-08 PROCEDURE — APPSS30 APP SPLIT SHARED TIME 16-30 MINUTES: Performed by: NURSE PRACTITIONER

## 2019-11-08 PROCEDURE — 2580000003 HC RX 258: Performed by: INTERNAL MEDICINE

## 2019-11-08 PROCEDURE — 86038 ANTINUCLEAR ANTIBODIES: CPT

## 2019-11-08 PROCEDURE — 2580000003 HC RX 258: Performed by: NURSE PRACTITIONER

## 2019-11-08 PROCEDURE — 93005 ELECTROCARDIOGRAM TRACING: CPT | Performed by: INTERNAL MEDICINE

## 2019-11-08 PROCEDURE — 85025 COMPLETE CBC W/AUTO DIFF WBC: CPT

## 2019-11-08 PROCEDURE — 70544 MR ANGIOGRAPHY HEAD W/O DYE: CPT

## 2019-11-08 PROCEDURE — 80053 COMPREHEN METABOLIC PANEL: CPT

## 2019-11-08 PROCEDURE — 86140 C-REACTIVE PROTEIN: CPT

## 2019-11-08 PROCEDURE — 99226 PR SBSQ OBSERVATION CARE/DAY 35 MINUTES: CPT | Performed by: INTERNAL MEDICINE

## 2019-11-08 PROCEDURE — 84443 ASSAY THYROID STIM HORMONE: CPT

## 2019-11-08 PROCEDURE — 36415 COLL VENOUS BLD VENIPUNCTURE: CPT

## 2019-11-08 PROCEDURE — 6370000000 HC RX 637 (ALT 250 FOR IP): Performed by: NURSE PRACTITIONER

## 2019-11-08 PROCEDURE — G0378 HOSPITAL OBSERVATION PER HR: HCPCS

## 2019-11-08 PROCEDURE — 83036 HEMOGLOBIN GLYCOSYLATED A1C: CPT

## 2019-11-08 PROCEDURE — 96372 THER/PROPH/DIAG INJ SC/IM: CPT

## 2019-11-08 PROCEDURE — 84439 ASSAY OF FREE THYROXINE: CPT

## 2019-11-08 PROCEDURE — 6370000000 HC RX 637 (ALT 250 FOR IP): Performed by: INTERNAL MEDICINE

## 2019-11-08 PROCEDURE — 80061 LIPID PANEL: CPT

## 2019-11-08 PROCEDURE — 6360000002 HC RX W HCPCS: Performed by: INTERNAL MEDICINE

## 2019-11-08 PROCEDURE — 85651 RBC SED RATE NONAUTOMATED: CPT

## 2019-11-08 RX ORDER — ATORVASTATIN CALCIUM 40 MG/1
40 TABLET, FILM COATED ORAL NIGHTLY
Status: DISCONTINUED | OUTPATIENT
Start: 2019-11-08 | End: 2019-11-09 | Stop reason: HOSPADM

## 2019-11-08 RX ORDER — ACYCLOVIR 50 MG/G
OINTMENT TOPICAL
Status: DISCONTINUED | OUTPATIENT
Start: 2019-11-08 | End: 2019-11-09 | Stop reason: HOSPADM

## 2019-11-08 RX ORDER — SODIUM CHLORIDE 9 MG/ML
INJECTION, SOLUTION INTRAVENOUS CONTINUOUS
Status: ACTIVE | OUTPATIENT
Start: 2019-11-08 | End: 2019-11-09

## 2019-11-08 RX ADMIN — ACYCLOVIR: 50 OINTMENT TOPICAL at 18:36

## 2019-11-08 RX ADMIN — MELATONIN 4.5 MG: at 22:39

## 2019-11-08 RX ADMIN — ACETAMINOPHEN 650 MG: 325 TABLET, FILM COATED ORAL at 13:37

## 2019-11-08 RX ADMIN — ACETAMINOPHEN 650 MG: 325 TABLET, FILM COATED ORAL at 06:18

## 2019-11-08 RX ADMIN — ATORVASTATIN CALCIUM 40 MG: 40 TABLET, FILM COATED ORAL at 22:34

## 2019-11-08 RX ADMIN — ASPIRIN 81 MG 81 MG: 81 TABLET ORAL at 09:29

## 2019-11-08 RX ADMIN — ENOXAPARIN SODIUM 40 MG: 40 INJECTION SUBCUTANEOUS at 09:29

## 2019-11-08 RX ADMIN — ACYCLOVIR: 50 OINTMENT TOPICAL at 15:27

## 2019-11-08 RX ADMIN — ACYCLOVIR: 50 OINTMENT TOPICAL at 12:04

## 2019-11-08 RX ADMIN — Medication 10 ML: at 22:37

## 2019-11-08 RX ADMIN — ACETAMINOPHEN 650 MG: 325 TABLET, FILM COATED ORAL at 22:33

## 2019-11-08 RX ADMIN — Medication 10 ML: at 09:29

## 2019-11-08 RX ADMIN — SODIUM CHLORIDE: 9 INJECTION, SOLUTION INTRAVENOUS at 15:27

## 2019-11-08 RX ADMIN — Medication 10 ML: at 15:27

## 2019-11-08 RX ADMIN — ACYCLOVIR: 50 OINTMENT TOPICAL at 22:34

## 2019-11-08 ASSESSMENT — PAIN DESCRIPTION - ORIENTATION
ORIENTATION: ANTERIOR
ORIENTATION: ANTERIOR

## 2019-11-08 ASSESSMENT — PAIN SCALES - GENERAL
PAINLEVEL_OUTOF10: 0
PAINLEVEL_OUTOF10: 4
PAINLEVEL_OUTOF10: 8
PAINLEVEL_OUTOF10: 6
PAINLEVEL_OUTOF10: 6
PAINLEVEL_OUTOF10: 0
PAINLEVEL_OUTOF10: 4
PAINLEVEL_OUTOF10: 0

## 2019-11-08 ASSESSMENT — PAIN DESCRIPTION - FREQUENCY
FREQUENCY: CONTINUOUS
FREQUENCY: CONTINUOUS

## 2019-11-08 ASSESSMENT — PAIN DESCRIPTION - DESCRIPTORS
DESCRIPTORS: ACHING;DISCOMFORT;HEADACHE
DESCRIPTORS: ACHING;CONSTANT;HEADACHE
DESCRIPTORS: ACHING;HEADACHE;DISCOMFORT

## 2019-11-08 ASSESSMENT — PAIN DESCRIPTION - PAIN TYPE
TYPE: ACUTE PAIN

## 2019-11-08 ASSESSMENT — PAIN - FUNCTIONAL ASSESSMENT
PAIN_FUNCTIONAL_ASSESSMENT: ACTIVITIES ARE NOT PREVENTED

## 2019-11-08 ASSESSMENT — PAIN DESCRIPTION - PROGRESSION
CLINICAL_PROGRESSION: GRADUALLY WORSENING
CLINICAL_PROGRESSION: GRADUALLY WORSENING

## 2019-11-08 ASSESSMENT — PAIN DESCRIPTION - ONSET
ONSET: ON-GOING
ONSET: ON-GOING

## 2019-11-08 ASSESSMENT — PAIN DESCRIPTION - LOCATION
LOCATION: HEAD

## 2019-11-09 VITALS
WEIGHT: 196.8 LBS | DIASTOLIC BLOOD PRESSURE: 63 MMHG | OXYGEN SATURATION: 99 % | SYSTOLIC BLOOD PRESSURE: 103 MMHG | HEART RATE: 64 BPM | BODY MASS INDEX: 34.87 KG/M2 | HEIGHT: 63 IN | TEMPERATURE: 98.6 F | RESPIRATION RATE: 18 BRPM

## 2019-11-09 LAB
ALBUMIN SERPL-MCNC: 3.8 G/DL (ref 3.5–5.2)
ALP BLD-CCNC: 59 U/L (ref 35–104)
ALT SERPL-CCNC: 12 U/L (ref 0–32)
ANION GAP SERPL CALCULATED.3IONS-SCNC: 10 MMOL/L (ref 7–16)
AST SERPL-CCNC: 16 U/L (ref 0–31)
BASOPHILS ABSOLUTE: 0.02 E9/L (ref 0–0.2)
BASOPHILS RELATIVE PERCENT: 0.5 % (ref 0–2)
BILIRUB SERPL-MCNC: 0.3 MG/DL (ref 0–1.2)
BUN BLDV-MCNC: 16 MG/DL (ref 6–20)
CALCIUM SERPL-MCNC: 8.9 MG/DL (ref 8.6–10.2)
CHLORIDE BLD-SCNC: 107 MMOL/L (ref 98–107)
CO2: 26 MMOL/L (ref 22–29)
CREAT SERPL-MCNC: 0.7 MG/DL (ref 0.5–1)
EOSINOPHILS ABSOLUTE: 0.23 E9/L (ref 0.05–0.5)
EOSINOPHILS RELATIVE PERCENT: 5.2 % (ref 0–6)
GFR AFRICAN AMERICAN: >60
GFR NON-AFRICAN AMERICAN: >60 ML/MIN/1.73
GLUCOSE BLD-MCNC: 89 MG/DL (ref 74–99)
HCT VFR BLD CALC: 32.3 % (ref 34–48)
HEMOGLOBIN: 10.5 G/DL (ref 11.5–15.5)
IMMATURE GRANULOCYTES #: 0.01 E9/L
IMMATURE GRANULOCYTES %: 0.2 % (ref 0–5)
LYMPHOCYTES ABSOLUTE: 1.64 E9/L (ref 1.5–4)
LYMPHOCYTES RELATIVE PERCENT: 37.2 % (ref 20–42)
MCH RBC QN AUTO: 29.7 PG (ref 26–35)
MCHC RBC AUTO-ENTMCNC: 32.5 % (ref 32–34.5)
MCV RBC AUTO: 91.2 FL (ref 80–99.9)
MONOCYTES ABSOLUTE: 0.42 E9/L (ref 0.1–0.95)
MONOCYTES RELATIVE PERCENT: 9.5 % (ref 2–12)
NEUTROPHILS ABSOLUTE: 2.09 E9/L (ref 1.8–7.3)
NEUTROPHILS RELATIVE PERCENT: 47.4 % (ref 43–80)
PDW BLD-RTO: 14.2 FL (ref 11.5–15)
PLATELET # BLD: 209 E9/L (ref 130–450)
PMV BLD AUTO: 10.9 FL (ref 7–12)
POTASSIUM SERPL-SCNC: 4.2 MMOL/L (ref 3.5–5)
RBC # BLD: 3.54 E12/L (ref 3.5–5.5)
SODIUM BLD-SCNC: 143 MMOL/L (ref 132–146)
TOTAL PROTEIN: 6.5 G/DL (ref 6.4–8.3)
WBC # BLD: 4.4 E9/L (ref 4.5–11.5)

## 2019-11-09 PROCEDURE — APPSS45 APP SPLIT SHARED TIME 31-45 MINUTES: Performed by: NURSE PRACTITIONER

## 2019-11-09 PROCEDURE — 6360000002 HC RX W HCPCS: Performed by: INTERNAL MEDICINE

## 2019-11-09 PROCEDURE — 85025 COMPLETE CBC W/AUTO DIFF WBC: CPT

## 2019-11-09 PROCEDURE — 6370000000 HC RX 637 (ALT 250 FOR IP): Performed by: INTERNAL MEDICINE

## 2019-11-09 PROCEDURE — 80053 COMPREHEN METABOLIC PANEL: CPT

## 2019-11-09 PROCEDURE — 99217 PR OBSERVATION CARE DISCHARGE MANAGEMENT: CPT | Performed by: INTERNAL MEDICINE

## 2019-11-09 PROCEDURE — 2580000003 HC RX 258: Performed by: INTERNAL MEDICINE

## 2019-11-09 PROCEDURE — 6370000000 HC RX 637 (ALT 250 FOR IP): Performed by: THORACIC SURGERY (CARDIOTHORACIC VASCULAR SURGERY)

## 2019-11-09 PROCEDURE — 96372 THER/PROPH/DIAG INJ SC/IM: CPT

## 2019-11-09 PROCEDURE — G0378 HOSPITAL OBSERVATION PER HR: HCPCS

## 2019-11-09 PROCEDURE — 36415 COLL VENOUS BLD VENIPUNCTURE: CPT

## 2019-11-09 RX ORDER — ATORVASTATIN CALCIUM 40 MG/1
40 TABLET, FILM COATED ORAL NIGHTLY
Qty: 30 TABLET | Refills: 0 | Status: SHIPPED | OUTPATIENT
Start: 2019-11-09 | End: 2020-09-14 | Stop reason: ALTCHOICE

## 2019-11-09 RX ORDER — CLOPIDOGREL BISULFATE 75 MG/1
75 TABLET ORAL DAILY
Qty: 30 TABLET | Refills: 0 | Status: SHIPPED | OUTPATIENT
Start: 2019-11-09 | End: 2020-09-14 | Stop reason: ALTCHOICE

## 2019-11-09 RX ORDER — CLOPIDOGREL BISULFATE 75 MG/1
75 TABLET ORAL DAILY
Status: DISCONTINUED | OUTPATIENT
Start: 2019-11-09 | End: 2019-11-09 | Stop reason: HOSPADM

## 2019-11-09 RX ORDER — ACYCLOVIR 50 MG/G
OINTMENT TOPICAL
Qty: 1 TUBE | Refills: 1
Start: 2019-11-09 | End: 2019-11-16

## 2019-11-09 RX ORDER — TRIAMCINOLONE ACETONIDE 0.1 %
PASTE (GRAM) DENTAL
Qty: 1 TUBE | Refills: 1 | Status: SHIPPED | OUTPATIENT
Start: 2019-11-09 | End: 2019-11-16

## 2019-11-09 RX ORDER — CLOPIDOGREL BISULFATE 75 MG/1
75 TABLET ORAL DAILY
Qty: 30 TABLET | Refills: 0 | Status: SHIPPED | OUTPATIENT
Start: 2019-11-09 | End: 2019-11-09

## 2019-11-09 RX ORDER — ASPIRIN 81 MG/1
81 TABLET ORAL DAILY
Qty: 30 TABLET | Refills: 0 | Status: ON HOLD | OUTPATIENT
Start: 2019-11-09 | End: 2020-07-30 | Stop reason: HOSPADM

## 2019-11-09 RX ADMIN — ACYCLOVIR: 50 OINTMENT TOPICAL at 05:58

## 2019-11-09 RX ADMIN — ACETAMINOPHEN 650 MG: 325 TABLET, FILM COATED ORAL at 09:49

## 2019-11-09 RX ADMIN — Medication 10 ML: at 08:40

## 2019-11-09 RX ADMIN — ENOXAPARIN SODIUM 40 MG: 40 INJECTION SUBCUTANEOUS at 08:38

## 2019-11-09 RX ADMIN — ACYCLOVIR: 50 OINTMENT TOPICAL at 08:38

## 2019-11-09 RX ADMIN — ASPIRIN 81 MG 81 MG: 81 TABLET ORAL at 08:38

## 2019-11-09 RX ADMIN — CLOPIDOGREL BISULFATE 75 MG: 75 TABLET ORAL at 09:49

## 2019-11-09 ASSESSMENT — PAIN SCALES - GENERAL
PAINLEVEL_OUTOF10: 0
PAINLEVEL_OUTOF10: 3
PAINLEVEL_OUTOF10: 0

## 2019-11-11 LAB — ANTI-NUCLEAR ANTIBODY (ANA): NEGATIVE

## 2020-01-02 ENCOUNTER — TELEPHONE (OUTPATIENT)
Dept: CARDIOLOGY CLINIC | Age: 55
End: 2020-01-02

## 2020-01-06 ENCOUNTER — HOSPITAL ENCOUNTER (OUTPATIENT)
Age: 55
Discharge: HOME | End: 2020-01-08
Payer: COMMERCIAL

## 2020-01-06 ENCOUNTER — HOSPITAL ENCOUNTER (OUTPATIENT)
Dept: GENERAL RADIOLOGY | Age: 55
Discharge: HOME | End: 2020-01-08
Payer: COMMERCIAL

## 2020-01-06 PROCEDURE — 71046 X-RAY EXAM CHEST 2 VIEWS: CPT

## 2020-02-14 ENCOUNTER — NURSE ONLY (OUTPATIENT)
Dept: CARDIOLOGY CLINIC | Age: 55
End: 2020-02-14

## 2020-02-14 ENCOUNTER — OFFICE VISIT (OUTPATIENT)
Dept: CARDIOLOGY CLINIC | Age: 55
End: 2020-02-14
Payer: COMMERCIAL

## 2020-02-14 VITALS
WEIGHT: 210 LBS | DIASTOLIC BLOOD PRESSURE: 50 MMHG | SYSTOLIC BLOOD PRESSURE: 82 MMHG | HEART RATE: 71 BPM | HEIGHT: 63 IN | BODY MASS INDEX: 37.21 KG/M2

## 2020-02-14 PROBLEM — R06.09 EXERTIONAL DYSPNEA: Status: ACTIVE | Noted: 2020-02-14

## 2020-02-14 PROBLEM — R00.2 PALPITATIONS: Status: ACTIVE | Noted: 2020-02-14

## 2020-02-14 PROBLEM — G89.4 PAIN SYNDROME, CHRONIC: Status: ACTIVE | Noted: 2020-02-14

## 2020-02-14 PROBLEM — E78.00 PURE HYPERCHOLESTEROLEMIA: Status: ACTIVE | Noted: 2020-02-14

## 2020-02-14 PROBLEM — R07.89 ATYPICAL CHEST PAIN: Status: ACTIVE | Noted: 2020-02-14

## 2020-02-14 PROBLEM — G45.9 TRANSIENT CEREBRAL ISCHEMIA: Status: ACTIVE | Noted: 2020-02-14

## 2020-02-14 PROCEDURE — 99244 OFF/OP CNSLTJ NEW/EST MOD 40: CPT | Performed by: INTERNAL MEDICINE

## 2020-02-14 PROCEDURE — G8427 DOCREV CUR MEDS BY ELIG CLIN: HCPCS | Performed by: INTERNAL MEDICINE

## 2020-02-14 PROCEDURE — G8484 FLU IMMUNIZE NO ADMIN: HCPCS | Performed by: INTERNAL MEDICINE

## 2020-02-14 PROCEDURE — 93000 ELECTROCARDIOGRAM COMPLETE: CPT | Performed by: INTERNAL MEDICINE

## 2020-02-14 PROCEDURE — G8417 CALC BMI ABV UP PARAM F/U: HCPCS | Performed by: INTERNAL MEDICINE

## 2020-02-14 RX ORDER — FAMOTIDINE 40 MG/1
TABLET, FILM COATED ORAL
COMMUNITY
Start: 2020-01-03 | End: 2020-06-30 | Stop reason: ALTCHOICE

## 2020-02-14 NOTE — PROGRESS NOTES
OUTPATIENT CARDIOLOGY CONSULTATION    Name: Elidia Dakins    Age: 53 y.o. Primary Care Physician: LONNY Garrett    Date of Service: 2/14/2020    Chief Complaint: Transient cerebral ischemia, carotid stenosis, exertional dyspnea, palpitations, atypical chest pain    Interim History: The patient is a 53-year-old white female with no previously documented structural heart disease. She suffered a transient cerebral ischemic episode in November, 2019 with speech abnormalities and weakness of her right face and mouth without a definitively identified abnormality via neurologic assessment. A carotid ultrasound demonstrated evidence of a moderate right internal carotid stenosis with plaque of her left internal carotid distribution no additional evaluation time of her ischemic event. She is subsequent been maintained on antiplatelet therapy as well as that of high-dose atorvastatin without recurrences. Subsequent to her event she relates a significant reduction of her functional capabilities on the basis of exertional dyspnea occurring in the absence of additional manifestations of decompensated left ventricular systolic dysfunction or volume overload. She relates atypically described continuous sharp stabbing chest discomfort of a nonischemic origin with no ischemic descriptors. Finally, she relates palpitations and the sensation of an intermittent tachycardia with home heart rate monitoring demonstrating heart rates of less than 110 bpm and in the absence of additional arrhythmia related symptoms. Review of Systems: The remainder of a complete multisystem review including consitutional, central nervous, respiratory, circulatory, gastrointestinal, genitourinary, endocrinologic, hematologic, musculoskeletal and psychiatric are negative.     Past Medical History:  Past Medical History:   Diagnosis Date    Arthritis arthritis back    and legs,     Back pain arthritis    Benign neoplasm of ovary Never Used   Substance and Sexual Activity    Alcohol use: Yes     Comment: socially. 2-3 cups Coffee per day    Drug use: No    Sexual activity: Not on file   Lifestyle    Physical activity:     Days per week: Not on file     Minutes per session: Not on file    Stress: Not on file   Relationships    Social connections:     Talks on phone: Not on file     Gets together: Not on file     Attends Christianity service: Not on file     Active member of club or organization: Not on file     Attends meetings of clubs or organizations: Not on file     Relationship status: Not on file    Intimate partner violence:     Fear of current or ex partner: Not on file     Emotionally abused: Not on file     Physically abused: Not on file     Forced sexual activity: Not on file   Other Topics Concern    Not on file   Social History Narrative    Not on file       Allergies:  No Known Allergies    Current Medications:  Current Outpatient Medications   Medication Sig Dispense Refill    famotidine (PEPCID) 40 MG tablet TAKE ONE TABLET BY MOUTH EVERY DAY AT BEDTIME      atorvastatin (LIPITOR) 40 MG tablet Take 1 tablet by mouth nightly 30 tablet 0    aspirin (PX ENTERIC ASPIRIN) 81 MG EC tablet Take 1 tablet by mouth daily 30 tablet 0    clopidogrel (PLAVIX) 75 MG tablet Take 1 tablet by mouth daily 30 tablet 0    ibuprofen (ADVIL) 200 MG tablet Take 600 mg by mouth every 6 hours as needed for Pain       No current facility-administered medications for this visit. Physical Exam:  BP (!) 82/50   Pulse 71   Ht 5' 3\" (1.6 m)   Wt 210 lb (95.3 kg)   LMP 06/20/2012   BMI 37.20 kg/m²   Wt Readings from Last 3 Encounters:   02/14/20 210 lb (95.3 kg)   11/07/19 196 lb 12.8 oz (89.3 kg)   08/30/19 186 lb (84.4 kg)     The patient is awake, alert and in no discomfort or distress. No gross musculoskeletal deformity is present. No significant skin or nail changes are present.  Gross examination of head, eyes, nose and throat

## 2020-03-11 ENCOUNTER — TELEPHONE (OUTPATIENT)
Dept: CARDIOLOGY CLINIC | Age: 55
End: 2020-03-11

## 2020-03-11 NOTE — TELEPHONE ENCOUNTER
Patient had her event monitor applied on February 14th. Today she had a problem with it and ran out of supplies. .  She is just going to mail it back since it's just a few days short

## 2020-04-02 ENCOUNTER — TELEPHONE (OUTPATIENT)
Dept: CARDIOLOGY CLINIC | Age: 55
End: 2020-04-02

## 2020-04-02 NOTE — TELEPHONE ENCOUNTER
4/2/2020 @ 1:40pm   Blossom Umanzor called for her monitor results and for follow up instructions. Per Dr Eleazar Garcia  No critical or serious occurrences on the monitor. We will get the echo done when we are able to get patients in the office. I gave her the above information and told her to call toward the end of May to see if we can schedule the echo yet.   chandu

## 2020-06-12 ENCOUNTER — TELEPHONE (OUTPATIENT)
Dept: CARDIOLOGY | Age: 55
End: 2020-06-12

## 2020-06-16 ENCOUNTER — HOSPITAL ENCOUNTER (OUTPATIENT)
Dept: CARDIOLOGY | Age: 55
Discharge: HOME | End: 2020-06-16
Payer: COMMERCIAL

## 2020-06-16 ENCOUNTER — TELEPHONE (OUTPATIENT)
Dept: CARDIOLOGY CLINIC | Age: 55
End: 2020-06-16

## 2020-06-16 LAB
LV EF: 60 %
LVEF MODALITY: NORMAL

## 2020-06-16 PROCEDURE — 93306 TTE W/DOPPLER COMPLETE: CPT

## 2020-06-16 PROCEDURE — 2580000003 HC RX 258: Performed by: INTERNAL MEDICINE

## 2020-06-16 RX ORDER — SODIUM CHLORIDE 0.9 % (FLUSH) 0.9 %
10 SYRINGE (ML) INJECTION PRN
Status: DISCONTINUED | OUTPATIENT
Start: 2020-06-16 | End: 2020-06-17 | Stop reason: HOSPADM

## 2020-06-16 RX ADMIN — SODIUM CHLORIDE, PRESERVATIVE FREE 10 ML: 5 INJECTION INTRAVENOUS at 08:16

## 2020-06-16 RX ADMIN — SODIUM CHLORIDE, PRESERVATIVE FREE 10 ML: 5 INJECTION INTRAVENOUS at 08:18

## 2020-06-30 ENCOUNTER — TELEPHONE (OUTPATIENT)
Dept: CARDIOLOGY CLINIC | Age: 55
End: 2020-06-30

## 2020-06-30 ENCOUNTER — OFFICE VISIT (OUTPATIENT)
Dept: CARDIOLOGY CLINIC | Age: 55
End: 2020-06-30
Payer: COMMERCIAL

## 2020-06-30 VITALS
HEIGHT: 63 IN | BODY MASS INDEX: 36.43 KG/M2 | RESPIRATION RATE: 20 BRPM | DIASTOLIC BLOOD PRESSURE: 80 MMHG | WEIGHT: 205.6 LBS | HEART RATE: 64 BPM | OXYGEN SATURATION: 98 % | SYSTOLIC BLOOD PRESSURE: 128 MMHG

## 2020-06-30 PROBLEM — E66.8 MODERATE OBESITY: Status: ACTIVE | Noted: 2020-06-30

## 2020-06-30 PROBLEM — E66.9 MODERATE OBESITY: Status: ACTIVE | Noted: 2020-06-30

## 2020-06-30 PROCEDURE — 99215 OFFICE O/P EST HI 40 MIN: CPT | Performed by: INTERNAL MEDICINE

## 2020-06-30 PROCEDURE — 1036F TOBACCO NON-USER: CPT | Performed by: INTERNAL MEDICINE

## 2020-06-30 PROCEDURE — G8427 DOCREV CUR MEDS BY ELIG CLIN: HCPCS | Performed by: INTERNAL MEDICINE

## 2020-06-30 PROCEDURE — 3017F COLORECTAL CA SCREEN DOC REV: CPT | Performed by: INTERNAL MEDICINE

## 2020-06-30 PROCEDURE — 93000 ELECTROCARDIOGRAM COMPLETE: CPT | Performed by: INTERNAL MEDICINE

## 2020-06-30 PROCEDURE — G8417 CALC BMI ABV UP PARAM F/U: HCPCS | Performed by: INTERNAL MEDICINE

## 2020-06-30 RX ORDER — OMEPRAZOLE 10 MG/1
CAPSULE, DELAYED RELEASE ORAL
Status: ON HOLD | COMMUNITY
Start: 2020-06-11 | End: 2020-07-29

## 2020-06-30 RX ORDER — CITALOPRAM 10 MG/1
TABLET ORAL
COMMUNITY
Start: 2020-06-11 | End: 2020-07-21

## 2020-06-30 RX ORDER — SODIUM CHLORIDE 0.9 % (FLUSH) 0.9 %
10 SYRINGE (ML) INJECTION PRN
Status: CANCELLED | OUTPATIENT
Start: 2020-06-30

## 2020-06-30 RX ORDER — SODIUM CHLORIDE 9 MG/ML
50 INJECTION, SOLUTION INTRAVENOUS CONTINUOUS
Status: CANCELLED | OUTPATIENT
Start: 2020-06-30

## 2020-06-30 RX ORDER — SODIUM CHLORIDE 0.9 % (FLUSH) 0.9 %
10 SYRINGE (ML) INJECTION EVERY 12 HOURS SCHEDULED
Status: CANCELLED | OUTPATIENT
Start: 2020-06-30

## 2020-06-30 SDOH — HEALTH STABILITY: MENTAL HEALTH: HOW MANY STANDARD DRINKS CONTAINING ALCOHOL DO YOU HAVE ON A TYPICAL DAY?: 1 OR 2

## 2020-06-30 SDOH — HEALTH STABILITY: MENTAL HEALTH: HOW OFTEN DO YOU HAVE A DRINK CONTAINING ALCOHOL?: 2-4 TIMES A MONTH

## 2020-06-30 NOTE — PROGRESS NOTES
 Food insecurity     Worry: Not on file     Inability: Not on file    Transportation needs     Medical: Not on file     Non-medical: Not on file   Tobacco Use    Smoking status: Never Smoker    Smokeless tobacco: Never Used   Substance and Sexual Activity    Alcohol use: Yes     Frequency: 2-4 times a month     Drinks per session: 1 or 2     Binge frequency: Never    Drug use: Never    Sexual activity: Not on file   Lifestyle    Physical activity     Days per week: Not on file     Minutes per session: Not on file    Stress: Not on file   Relationships    Social connections     Talks on phone: Not on file     Gets together: Not on file     Attends Yarsanism service: Not on file     Active member of club or organization: Not on file     Attends meetings of clubs or organizations: Not on file     Relationship status: Not on file    Intimate partner violence     Fear of current or ex partner: Not on file     Emotionally abused: Not on file     Physically abused: Not on file     Forced sexual activity: Not on file   Other Topics Concern    Not on file   Social History Narrative    Drinks 2 cups of coffee daily. Allergies:  No Known Allergies    Current Medications:  Current Outpatient Medications   Medication Sig Dispense Refill    omeprazole (PRILOSEC) 10 MG delayed release capsule take 1 capsule by oral route  every day before a meal      citalopram (CELEXA) 10 MG tablet take 1 tablet by mouth once daily      Multiple Vitamin (MULTI-VITAMIN DAILY PO) Take by mouth daily      atorvastatin (LIPITOR) 40 MG tablet Take 1 tablet by mouth nightly 30 tablet 0    aspirin (PX ENTERIC ASPIRIN) 81 MG EC tablet Take 1 tablet by mouth daily 30 tablet 0    clopidogrel (PLAVIX) 75 MG tablet Take 1 tablet by mouth daily 30 tablet 0    ibuprofen (ADVIL) 200 MG tablet Take 600 mg by mouth every 6 hours as needed for Pain       No current facility-administered medications for this visit.           Physical Exam:  /80 (Site: Right Upper Arm, Position: Sitting, Cuff Size: Large Adult)   Pulse 64   Resp 20   Ht 5' 3\" (1.6 m)   Wt 205 lb 9.6 oz (93.3 kg)   LMP 06/20/2012   SpO2 98%   BMI 36.42 kg/m²   Wt Readings from Last 3 Encounters:   06/30/20 205 lb 9.6 oz (93.3 kg)   02/14/20 210 lb (95.3 kg)   11/07/19 196 lb 12.8 oz (89.3 kg)     The patient is awake, alert and in no discomfort or distress. No gross musculoskeletal deformity is present. No significant skin or nail changes are present. Gross examination of head, eyes, nose and throat are negative. Jugular venous pressure is normal and no carotid bruits are present. Normal respiratory effort is noted with no accessory muscle usage present. Lung fields are clear to ascultation. Cardiac examination is notable for a regular rate and rhythm with no palpable thrill. No gallop rhythm or cardiac murmur are identified. A benign abdominal examination is present with the exception of obesity and no masses or organomegaly. Intact pulses are present throughout all extremities and no peripheral edema is present. No focal neurologic deficits are present. Laboratory Tests:  Lab Results   Component Value Date    CREATININE 0.7 11/09/2019    BUN 16 11/09/2019     11/09/2019    K 4.2 11/09/2019     11/09/2019    CO2 26 11/09/2019     No results found for: BNP  Lab Results   Component Value Date    WBC 4.4 11/09/2019    RBC 3.54 11/09/2019    HGB 10.5 11/09/2019    HCT 32.3 11/09/2019    MCV 91.2 11/09/2019    MCH 29.7 11/09/2019    MCHC 32.5 11/09/2019    RDW 14.2 11/09/2019     11/09/2019    MPV 10.9 11/09/2019     No results for input(s): ALKPHOS, ALT, AST, PROT, BILITOT, BILIDIR, LABALBU in the last 72 hours.   No results found for: MG  Lab Results   Component Value Date    PROTIME 11.0 05/18/2011    INR 1.0 05/18/2011     Lab Results   Component Value Date    TSH 3.800 11/08/2019     No components found for: CHLPL  Lab Results   Component Value Date    TRIG 89 11/08/2019     Lab Results   Component Value Date    HDL 77 11/08/2019     Lab Results   Component Value Date    LDLCALC 105 (H) 11/08/2019       Cardiac Tests:  ECG: A resting electrocardiogram demonstrates evidence of sinus rhythm and is otherwise unremarkable  Last Echocardiogram: An echocardiogram June, 2020 demonstrated a normal size left ventricular chamber with normal left ventricular systolic function no significant valvular pathology with suggestion but not definitive evidence of a patent foramen ovale with a right to left shunt      ASSESSMENT / PLAN: On a clinical basis, the patient presently remains stable from a cardiovascular standpoint with no recurrent neurologic symptoms in the face of a previous incident cerebral ischemic episode of uncertain origin. Based on the absence of her carotid pathology explaining this event along with the findings of her echocardiogram in the absence of cardiac arrhythmias requiring anticoagulation, I have recommended the performance of a transesophageal echocardiogram to further define the presence or absence of a patent foramen ovale potentially requiring intervention to reduce risk of further embolic events. I have extensively discussed this with her at the time of assessment including that of proposed benefits, risks and alternatives and she is agreeable in proceeding. In the interim, with the exception of recommending a discontinuation of her nonsteroidal anti-inflammatory agent, both based on its cardiovascular risk and increased risk of bleeding in association with her dual antiplatelet therapy with alternatives deferred to your discretion, I have maintained therapy and will defer to the neurology service the appropriate duration of dual antiplatelet therapy. Continued aggressive risk factor modification of blood pressure and serum lipids will remain essential to reducing risk of future atherosclerotic development.   At the time of evaluation, I additionally have a discussed her needs of appropriate lifestyle modification to achieve weight reduction in light of her reported nocturnal snoring and daytime fatigue feel that obstructive sleep apnea may be present would recommend your consideration of a formal sleep assessment and the initiation of nocturnal CPAP as appropriate. I will provide additional recommendations following the review of her transesophageal echocardiogram will base her needs of additional cardiovascular follow-up on its results. If not dictated by its findings, I will return her to your care and would happily reevaluate her in the future should additional cardiovascular difficulties or concerns arise. The patient's current medication list, allergies, problem list and results of all previously ordered testing were reviewed at today's visit. Follow-up office visit based on transesophageal echocardiographic findings    The duration of discussion counseling in conjunction with the present encounter exceeded 40 minutes      Note: This report was completed using computerized voice recognition software. Every effort has been made to ensure accuracy, however; and invert and computerized transcription errors may be present. Valerie Arrington.  Jax Apple, [facility] Cardiology

## 2020-07-16 ENCOUNTER — HOSPITAL ENCOUNTER (OUTPATIENT)
Age: 55
Discharge: HOME | End: 2020-07-18
Payer: COMMERCIAL

## 2020-07-16 PROCEDURE — U0003 INFECTIOUS AGENT DETECTION BY NUCLEIC ACID (DNA OR RNA); SEVERE ACUTE RESPIRATORY SYNDROME CORONAVIRUS 2 (SARS-COV-2) (CORONAVIRUS DISEASE [COVID-19]), AMPLIFIED PROBE TECHNIQUE, MAKING USE OF HIGH THROUGHPUT TECHNOLOGIES AS DESCRIBED BY CMS-2020-01-R: HCPCS

## 2020-07-18 LAB
SARS-COV-2: NOT DETECTED
SOURCE: NORMAL

## 2020-07-20 ENCOUNTER — TELEPHONE (OUTPATIENT)
Dept: NON INVASIVE DIAGNOSTICS | Age: 55
End: 2020-07-20

## 2020-07-21 ENCOUNTER — ANESTHESIA (OUTPATIENT)
Dept: CARDIAC CATH/INVASIVE PROCEDURES | Age: 55
End: 2020-07-21

## 2020-07-21 ENCOUNTER — HOSPITAL ENCOUNTER (OUTPATIENT)
Dept: CARDIAC CATH/INVASIVE PROCEDURES | Age: 55
Discharge: HOME | End: 2020-07-21
Payer: COMMERCIAL

## 2020-07-21 ENCOUNTER — ANESTHESIA EVENT (OUTPATIENT)
Dept: CARDIAC CATH/INVASIVE PROCEDURES | Age: 55
End: 2020-07-21

## 2020-07-21 VITALS — DIASTOLIC BLOOD PRESSURE: 70 MMHG | SYSTOLIC BLOOD PRESSURE: 140 MMHG | OXYGEN SATURATION: 98 %

## 2020-07-21 PROBLEM — Q21.12 PATENT FORAMEN OVALE: Status: ACTIVE | Noted: 2020-07-21

## 2020-07-21 PROCEDURE — 93325 DOPPLER ECHO COLOR FLOW MAPG: CPT

## 2020-07-21 PROCEDURE — 93321 DOPPLER ECHO F-UP/LMTD STD: CPT

## 2020-07-21 PROCEDURE — 2709999900 HC NON-CHARGEABLE SUPPLY

## 2020-07-21 PROCEDURE — 2580000003 HC RX 258: Performed by: INTERNAL MEDICINE

## 2020-07-21 PROCEDURE — 3700000000 HC ANESTHESIA ATTENDED CARE

## 2020-07-21 PROCEDURE — 93312 ECHO TRANSESOPHAGEAL: CPT

## 2020-07-21 PROCEDURE — 6360000002 HC RX W HCPCS: Performed by: NURSE ANESTHETIST, CERTIFIED REGISTERED

## 2020-07-21 PROCEDURE — 3700000001 HC ADD 15 MINUTES (ANESTHESIA)

## 2020-07-21 RX ORDER — SODIUM CHLORIDE 9 MG/ML
50 INJECTION, SOLUTION INTRAVENOUS CONTINUOUS
Status: DISCONTINUED | OUTPATIENT
Start: 2020-07-21 | End: 2020-07-22 | Stop reason: HOSPADM

## 2020-07-21 RX ORDER — SODIUM CHLORIDE 0.9 % (FLUSH) 0.9 %
10 SYRINGE (ML) INJECTION EVERY 12 HOURS SCHEDULED
Status: DISCONTINUED | OUTPATIENT
Start: 2020-07-21 | End: 2020-07-22 | Stop reason: HOSPADM

## 2020-07-21 RX ORDER — PROPOFOL 10 MG/ML
INJECTION, EMULSION INTRAVENOUS PRN
Status: DISCONTINUED | OUTPATIENT
Start: 2020-07-21 | End: 2020-07-21 | Stop reason: SDUPTHER

## 2020-07-21 RX ORDER — SODIUM CHLORIDE 0.9 % (FLUSH) 0.9 %
10 SYRINGE (ML) INJECTION PRN
Status: DISCONTINUED | OUTPATIENT
Start: 2020-07-21 | End: 2020-07-22 | Stop reason: HOSPADM

## 2020-07-21 RX ADMIN — PROPOFOL 270 MG: 10 INJECTION, EMULSION INTRAVENOUS at 08:35

## 2020-07-21 RX ADMIN — SODIUM CHLORIDE 50 ML/HR: 9 INJECTION, SOLUTION INTRAVENOUS at 07:08

## 2020-07-21 ASSESSMENT — ENCOUNTER SYMPTOMS: SHORTNESS OF BREATH: 1

## 2020-07-21 NOTE — PROCEDURES
PRELIMINARY TRANSESOPHAGEAL ECHOCARDIOGRAPHY REPORT    Indications for study:  Embolic source    Study performed using (Sedation): Per anesthesia    Complications: None    Preliminary findings: Normal LV function, normal RV function, no hemodynamically significant valve disease, no evidence of vegetations, no left atrial or left atrial appendage thrombus (no evidence of spontaneous echo contrast), patent foramen ovale with right to left intraatrial shunting on bubble study, no significant atherosclerosis of the aorta. For patient status during procedure, refer to intra-procedure sedation flowsheet. The complete LIZZIE report will follow - see \"CARDIOLOGY\" Section in [address] [place] [address] Rd. Gareth Cruz.  Keira Hooks, [address] [place] [address] Cardiology

## 2020-07-21 NOTE — ANESTHESIA POSTPROCEDURE EVALUATION
Department of Anesthesiology  Postprocedure Note    Patient: Umer House  MRN: [id number]  YOB: 1965  Date of evaluation: 7/21/2020  Time:  8:55 AM     Procedure Summary     Date:  07/21/20 Room / Location:  [facility] CATH LAB; [facility] ECHO    Anesthesia Start:  6800 Anesthesia Stop:  0046    Procedure:  SEY LIZZIE Diagnosis:  Occlusion and stenosis of unspecified carotid artery    Scheduled Providers:  RUFUS Baez - CRNA; Babs Mera DO Responsible Provider:  Babs Mera DO    Anesthesia Type:  MAC ASA Status:  4          Anesthesia Type: MAC    Umesh Phase I:      Umesh Phase II:      Last vitals: Reviewed and per EMR flowsheets.        Anesthesia Post Evaluation    Patient location during evaluation: bedside  Patient participation: complete - patient cannot participate  Level of consciousness: awake and alert  Airway patency: patent  Nausea & Vomiting: no nausea and no vomiting  Complications: no  Cardiovascular status: blood pressure returned to baseline  Respiratory status: acceptable  Hydration status: euvolemic

## 2020-07-22 ENCOUNTER — HOSPITAL ENCOUNTER (OUTPATIENT)
Age: 55
Discharge: HOME | End: 2020-07-22
Payer: COMMERCIAL

## 2020-07-22 ENCOUNTER — OFFICE VISIT (OUTPATIENT)
Dept: CARDIOLOGY CLINIC | Age: 55
End: 2020-07-22
Payer: COMMERCIAL

## 2020-07-22 VITALS
HEIGHT: 63 IN | DIASTOLIC BLOOD PRESSURE: 64 MMHG | SYSTOLIC BLOOD PRESSURE: 90 MMHG | BODY MASS INDEX: 36.93 KG/M2 | RESPIRATION RATE: 16 BRPM | HEART RATE: 60 BPM | WEIGHT: 208.4 LBS

## 2020-07-22 PROBLEM — Z01.818 PRE-OP EVALUATION: Status: ACTIVE | Noted: 2020-07-22

## 2020-07-22 LAB
ANION GAP SERPL CALCULATED.3IONS-SCNC: 10 MMOL/L (ref 7–16)
APTT: 29.4 SEC (ref 24.5–35.1)
BASOPHILS ABSOLUTE: 0.02 E9/L (ref 0–0.2)
BASOPHILS RELATIVE PERCENT: 0.4 % (ref 0–2)
BILIRUBIN URINE: NEGATIVE
BLOOD, URINE: NEGATIVE
BUN BLDV-MCNC: 13 MG/DL (ref 6–20)
CALCIUM SERPL-MCNC: 9.6 MG/DL (ref 8.6–10.2)
CHLORIDE BLD-SCNC: 108 MMOL/L (ref 98–107)
CHOLESTEROL, TOTAL: 151 MG/DL (ref 0–199)
CLARITY: CLEAR
CO2: 28 MMOL/L (ref 22–29)
COLOR: YELLOW
CREAT SERPL-MCNC: 0.7 MG/DL (ref 0.5–1)
EOSINOPHILS ABSOLUTE: 0.18 E9/L (ref 0.05–0.5)
EOSINOPHILS RELATIVE PERCENT: 3.7 % (ref 0–6)
GFR AFRICAN AMERICAN: >60
GFR NON-AFRICAN AMERICAN: >60 ML/MIN/1.73
GLUCOSE BLD-MCNC: 99 MG/DL (ref 74–99)
GLUCOSE URINE: NEGATIVE MG/DL
HCT VFR BLD CALC: 37.8 % (ref 34–48)
HDLC SERPL-MCNC: 77 MG/DL
HEMOGLOBIN: 12.1 G/DL (ref 11.5–15.5)
IMMATURE GRANULOCYTES #: 0.01 E9/L
IMMATURE GRANULOCYTES %: 0.2 % (ref 0–5)
INR BLD: 1
KETONES, URINE: NEGATIVE MG/DL
LDL CHOLESTEROL CALCULATED: 59 MG/DL (ref 0–99)
LEUKOCYTE ESTERASE, URINE: NEGATIVE
LYMPHOCYTES ABSOLUTE: 1.25 E9/L (ref 1.5–4)
LYMPHOCYTES RELATIVE PERCENT: 25.7 % (ref 20–42)
MCH RBC QN AUTO: 29.3 PG (ref 26–35)
MCHC RBC AUTO-ENTMCNC: 32 % (ref 32–34.5)
MCV RBC AUTO: 91.5 FL (ref 80–99.9)
MONOCYTES ABSOLUTE: 0.52 E9/L (ref 0.1–0.95)
MONOCYTES RELATIVE PERCENT: 10.7 % (ref 2–12)
NEUTROPHILS ABSOLUTE: 2.89 E9/L (ref 1.8–7.3)
NEUTROPHILS RELATIVE PERCENT: 59.3 % (ref 43–80)
NITRITE, URINE: NEGATIVE
PDW BLD-RTO: 15 FL (ref 11.5–15)
PH UA: 6 (ref 5–9)
PLATELET # BLD: 250 E9/L (ref 130–450)
PMV BLD AUTO: 10.1 FL (ref 7–12)
POTASSIUM SERPL-SCNC: 4.3 MMOL/L (ref 3.5–5)
PROTEIN UA: NEGATIVE MG/DL
PROTHROMBIN TIME: 10.8 SEC (ref 9.3–12.4)
RBC # BLD: 4.13 E12/L (ref 3.5–5.5)
SODIUM BLD-SCNC: 146 MMOL/L (ref 132–146)
SPECIFIC GRAVITY UA: 1.02 (ref 1–1.03)
TRIGL SERPL-MCNC: 74 MG/DL (ref 0–149)
UROBILINOGEN, URINE: 0.2 E.U./DL
VLDLC SERPL CALC-MCNC: 15 MG/DL
WBC # BLD: 4.9 E9/L (ref 4.5–11.5)

## 2020-07-22 PROCEDURE — 81003 URINALYSIS AUTO W/O SCOPE: CPT

## 2020-07-22 PROCEDURE — 80061 LIPID PANEL: CPT

## 2020-07-22 PROCEDURE — 85610 PROTHROMBIN TIME: CPT

## 2020-07-22 PROCEDURE — 85025 COMPLETE CBC W/AUTO DIFF WBC: CPT

## 2020-07-22 PROCEDURE — 85730 THROMBOPLASTIN TIME PARTIAL: CPT

## 2020-07-22 PROCEDURE — 99203 OFFICE O/P NEW LOW 30 MIN: CPT | Performed by: INTERNAL MEDICINE

## 2020-07-22 PROCEDURE — 36415 COLL VENOUS BLD VENIPUNCTURE: CPT

## 2020-07-22 PROCEDURE — 80048 BASIC METABOLIC PNL TOTAL CA: CPT

## 2020-07-22 RX ORDER — ACETAMINOPHEN 325 MG/1
650 TABLET ORAL EVERY 6 HOURS PRN
Status: ON HOLD | COMMUNITY
End: 2020-07-30 | Stop reason: HOSPADM

## 2020-07-24 ENCOUNTER — HOSPITAL ENCOUNTER (OUTPATIENT)
Age: 55
Discharge: HOME | End: 2020-07-26
Payer: COMMERCIAL

## 2020-07-24 ENCOUNTER — TELEPHONE (OUTPATIENT)
Dept: CARDIOLOGY CLINIC | Age: 55
End: 2020-07-24

## 2020-07-24 PROCEDURE — U0003 INFECTIOUS AGENT DETECTION BY NUCLEIC ACID (DNA OR RNA); SEVERE ACUTE RESPIRATORY SYNDROME CORONAVIRUS 2 (SARS-COV-2) (CORONAVIRUS DISEASE [COVID-19]), AMPLIFIED PROBE TECHNIQUE, MAKING USE OF HIGH THROUGHPUT TECHNOLOGIES AS DESCRIBED BY CMS-2020-01-R: HCPCS

## 2020-07-24 NOTE — TELEPHONE ENCOUNTER
Laureen Weldon 1965   Verified she will have Covid testing done today. Utilizing [address] [facility] [address] though testing site.     Yoandy Bruce RN

## 2020-07-26 LAB
SARS-COV-2: NOT DETECTED
SOURCE: NORMAL

## 2020-07-28 ENCOUNTER — TELEPHONE (OUTPATIENT)
Dept: CARDIOLOGY CLINIC | Age: 55
End: 2020-07-28

## 2020-07-28 NOTE — TELEPHONE ENCOUNTER
PFO      Joao Cunningham,  YOB: 1965   [phone] (home)      Covid Testin2020  Labs: BMP/CBC/INR/PTT/UA: 2020     Limited Echo without Bubble: POD 0 to be done while inpatient. Limited Echo without Bubble:  POD 30   2020 7am  Limited Echo With Bubble:       6 months post closure   (  Date To be determined  End of Feb/ beginning of 2021)    1 month Follow up with DR Giordano Mode: 2020 8am       Reminder to take ASA and Plavix morning of procedure. Reviewed Symptoms common to a number of infectious diseases:   Fever    Chills   Sweats   Diarrhea    Fatigue    Muscle aches    shakiness   Coughing   Pain or burning when you urinate   The urge to urinate often   Pressure in lower belly (abdomen)   Urine smells bad or looks cloudy or reddish   Pain in your back or side below the ribs    Call Dr. Giuliana Hooper if any questions      I Left message for Joao Cunningham  With review as above and to please call our office to confirm you have none of the listed s/s infection or any other infection and understand instructions for tomorrow's procedure.  option 2. 8:45 AM 2020       Spoke with Ana Alcaraz. Denies any s/s of infection understands instructions. Accepts echo apt and  f/u 1 month. Will take ASA and Plavix as directed. Emotional support provided.    Catina Trimble RN BSN CHFN

## 2020-07-29 ENCOUNTER — APPOINTMENT (OUTPATIENT)
Dept: GENERAL RADIOLOGY | Age: 55
End: 2020-07-29
Attending: INTERNAL MEDICINE
Payer: COMMERCIAL

## 2020-07-29 ENCOUNTER — HOSPITAL ENCOUNTER (OUTPATIENT)
Dept: CARDIAC CATH/INVASIVE PROCEDURES | Age: 55
Setting detail: OBSERVATION
Discharge: HOME | End: 2020-07-30
Attending: INTERNAL MEDICINE | Admitting: INTERNAL MEDICINE
Payer: COMMERCIAL

## 2020-07-29 LAB
ABO/RH: NORMAL
ANTIBODY SCREEN: NORMAL
POC ACT LR: 148 SECONDS
POC ACT LR: 304 SECONDS

## 2020-07-29 PROCEDURE — 93662 INTRACARDIAC ECG (ICE): CPT | Performed by: INTERNAL MEDICINE

## 2020-07-29 PROCEDURE — 93662 INTRACARDIAC ECG (ICE): CPT

## 2020-07-29 PROCEDURE — 86901 BLOOD TYPING SEROLOGIC RH(D): CPT

## 2020-07-29 PROCEDURE — 93580 TRANSCATH CLOSURE OF ASD: CPT | Performed by: INTERNAL MEDICINE

## 2020-07-29 PROCEDURE — 2500000003 HC RX 250 WO HCPCS

## 2020-07-29 PROCEDURE — C1894 INTRO/SHEATH, NON-LASER: HCPCS

## 2020-07-29 PROCEDURE — C1817 SEPTAL DEFECT IMP SYS: HCPCS

## 2020-07-29 PROCEDURE — 6360000002 HC RX W HCPCS: Performed by: INTERNAL MEDICINE

## 2020-07-29 PROCEDURE — 71045 X-RAY EXAM CHEST 1 VIEW: CPT

## 2020-07-29 PROCEDURE — 6370000000 HC RX 637 (ALT 250 FOR IP): Performed by: INTERNAL MEDICINE

## 2020-07-29 PROCEDURE — 86850 RBC ANTIBODY SCREEN: CPT

## 2020-07-29 PROCEDURE — C1759 CATH, INTRA ECHOCARDIOGRAPHY: HCPCS

## 2020-07-29 PROCEDURE — 6360000002 HC RX W HCPCS

## 2020-07-29 PROCEDURE — 93580 TRANSCATH CLOSURE OF ASD: CPT

## 2020-07-29 PROCEDURE — G0378 HOSPITAL OBSERVATION PER HR: HCPCS

## 2020-07-29 PROCEDURE — 6370000000 HC RX 637 (ALT 250 FOR IP)

## 2020-07-29 PROCEDURE — C1769 GUIDE WIRE: HCPCS

## 2020-07-29 PROCEDURE — 36415 COLL VENOUS BLD VENIPUNCTURE: CPT

## 2020-07-29 PROCEDURE — G0379 DIRECT REFER HOSPITAL OBSERV: HCPCS

## 2020-07-29 PROCEDURE — 2580000003 HC RX 258: Performed by: INTERNAL MEDICINE

## 2020-07-29 PROCEDURE — 85347 COAGULATION TIME ACTIVATED: CPT

## 2020-07-29 PROCEDURE — 86900 BLOOD TYPING SEROLOGIC ABO: CPT

## 2020-07-29 PROCEDURE — 2709999900 HC NON-CHARGEABLE SUPPLY

## 2020-07-29 PROCEDURE — 93005 ELECTROCARDIOGRAM TRACING: CPT | Performed by: INTERNAL MEDICINE

## 2020-07-29 RX ORDER — ASPIRIN 81 MG/1
81 TABLET ORAL DAILY
Status: DISCONTINUED | OUTPATIENT
Start: 2020-07-30 | End: 2020-07-30 | Stop reason: HOSPADM

## 2020-07-29 RX ORDER — ACETAMINOPHEN 325 MG/1
650 TABLET ORAL EVERY 6 HOURS PRN
Status: DISCONTINUED | OUTPATIENT
Start: 2020-07-29 | End: 2020-07-29

## 2020-07-29 RX ORDER — ACETAMINOPHEN 325 MG/1
650 TABLET ORAL EVERY 4 HOURS PRN
Status: DISCONTINUED | OUTPATIENT
Start: 2020-07-29 | End: 2020-07-30 | Stop reason: HOSPADM

## 2020-07-29 RX ORDER — MAGNESIUM HYDROXIDE/ALUMINUM HYDROXICE/SIMETHICONE 120; 1200; 1200 MG/30ML; MG/30ML; MG/30ML
30 SUSPENSION ORAL EVERY 6 HOURS PRN
Status: DISCONTINUED | OUTPATIENT
Start: 2020-07-29 | End: 2020-07-30 | Stop reason: HOSPADM

## 2020-07-29 RX ORDER — SODIUM CHLORIDE 9 MG/ML
INJECTION, SOLUTION INTRAVENOUS CONTINUOUS
Status: DISCONTINUED | OUTPATIENT
Start: 2020-07-29 | End: 2020-07-29

## 2020-07-29 RX ORDER — OXYCODONE HYDROCHLORIDE AND ACETAMINOPHEN 5; 325 MG/1; MG/1
1 TABLET ORAL EVERY 6 HOURS PRN
Status: DISCONTINUED | OUTPATIENT
Start: 2020-07-29 | End: 2020-07-30 | Stop reason: HOSPADM

## 2020-07-29 RX ORDER — CLOPIDOGREL BISULFATE 75 MG/1
300 TABLET ORAL ONCE
Status: DISCONTINUED | OUTPATIENT
Start: 2020-07-29 | End: 2020-07-30 | Stop reason: HOSPADM

## 2020-07-29 RX ORDER — PANTOPRAZOLE SODIUM 40 MG/1
40 TABLET, DELAYED RELEASE ORAL
Status: DISCONTINUED | OUTPATIENT
Start: 2020-07-30 | End: 2020-07-30 | Stop reason: HOSPADM

## 2020-07-29 RX ORDER — SODIUM CHLORIDE 0.9 % (FLUSH) 0.9 %
10 SYRINGE (ML) INJECTION EVERY 12 HOURS SCHEDULED
Status: DISCONTINUED | OUTPATIENT
Start: 2020-07-29 | End: 2020-07-30 | Stop reason: HOSPADM

## 2020-07-29 RX ORDER — ASPIRIN 325 MG
325 TABLET ORAL ONCE
Status: COMPLETED | OUTPATIENT
Start: 2020-07-29 | End: 2020-07-29

## 2020-07-29 RX ORDER — SODIUM CHLORIDE 0.9 % (FLUSH) 0.9 %
10 SYRINGE (ML) INJECTION PRN
Status: DISCONTINUED | OUTPATIENT
Start: 2020-07-29 | End: 2020-07-30 | Stop reason: HOSPADM

## 2020-07-29 RX ORDER — ATORVASTATIN CALCIUM 40 MG/1
40 TABLET, FILM COATED ORAL NIGHTLY
Status: DISCONTINUED | OUTPATIENT
Start: 2020-07-29 | End: 2020-07-30 | Stop reason: HOSPADM

## 2020-07-29 RX ORDER — CLOPIDOGREL BISULFATE 75 MG/1
75 TABLET ORAL DAILY
Status: DISCONTINUED | OUTPATIENT
Start: 2020-07-30 | End: 2020-07-30 | Stop reason: HOSPADM

## 2020-07-29 RX ADMIN — ALUMINUM HYDROXIDE, MAGNESIUM HYDROXIDE, AND SIMETHICONE 30 ML: 200; 200; 20 SUSPENSION ORAL at 23:09

## 2020-07-29 RX ADMIN — SODIUM CHLORIDE, PRESERVATIVE FREE 10 ML: 5 INJECTION INTRAVENOUS at 21:55

## 2020-07-29 RX ADMIN — ACETAMINOPHEN 650 MG: 325 TABLET ORAL at 17:16

## 2020-07-29 RX ADMIN — Medication 2 G: at 11:54

## 2020-07-29 RX ADMIN — ACETAMINOPHEN 650 MG: 325 TABLET ORAL at 22:22

## 2020-07-29 RX ADMIN — ATORVASTATIN CALCIUM 40 MG: 40 TABLET, FILM COATED ORAL at 21:55

## 2020-07-29 RX ADMIN — ASPIRIN 325 MG: 325 TABLET, FILM COATED ORAL at 11:17

## 2020-07-29 RX ADMIN — SODIUM CHLORIDE: 9 INJECTION, SOLUTION INTRAVENOUS at 11:14

## 2020-07-29 ASSESSMENT — PAIN DESCRIPTION - FREQUENCY: FREQUENCY: INTERMITTENT

## 2020-07-29 ASSESSMENT — PAIN DESCRIPTION - LOCATION
LOCATION: GROIN
LOCATION: CHEST;BACK;NECK
LOCATION: GROIN

## 2020-07-29 ASSESSMENT — PAIN DESCRIPTION - PAIN TYPE
TYPE: ACUTE PAIN
TYPE: ACUTE PAIN

## 2020-07-29 ASSESSMENT — PAIN SCALES - GENERAL
PAINLEVEL_OUTOF10: 0
PAINLEVEL_OUTOF10: 0
PAINLEVEL_OUTOF10: 10
PAINLEVEL_OUTOF10: 0
PAINLEVEL_OUTOF10: 10
PAINLEVEL_OUTOF10: 4

## 2020-07-29 ASSESSMENT — PAIN DESCRIPTION - ORIENTATION: ORIENTATION: MID

## 2020-07-29 ASSESSMENT — PAIN DESCRIPTION - ONSET: ONSET: SUDDEN

## 2020-07-29 ASSESSMENT — PAIN DESCRIPTION - DIRECTION: RADIATING_TOWARDS: RT SIDE OF NECK

## 2020-07-29 ASSESSMENT — PAIN DESCRIPTION - PROGRESSION: CLINICAL_PROGRESSION: NOT CHANGED

## 2020-07-29 NOTE — PROCEDURES
PFO CLOSURE REPORT    : Ronda Hooper     Date of procedure: 07/29/20       Indication:  1. Cryptogenic stroke/TIA    Procedures:  PFO closure     Sedation/analgesia:  Midazolam IV and Fentanyl IV     Time sedation was administered: 1220. I was present in the room when sedation was administered. Procedure end time: 1956  Time spent with face to face monitoring of moderate sedation: 72 minutes    Brief history:  58-year-old  female with history of simple migraines who had a vertebrobasilar TIA in November 2019 with a negative evaluation other than a PFO with right-to-left shunting. She was referred for percutaneous PFO closure. Her RoPE score is 6. Description of procedure: The patient presented to the Cath Lab in a(n) elective fashion. The patient was prepped and draped in a sterile manner. Timeout, airway and ASA assessment were completed. Local anesthesia with 1% lidocaine was administered and sedation/analgesia were administered as above. Access was obtained in the right femoral vein using ultrasound guidance. A short 8 Malian sheath and a braided 9 Malian sheath were placed sequentially in the right femoral vein. The 9 Malian sheath was used to allow for ICE catheter insertion for procedure guidance. 9000 units of IV heparin were given and ACT greater than 300 was achieved. We advanced a 5 Dala Maryam multipurpose catheter over a standard J-wire into the right atrium. The J-wire was removed and a 260 cm Davis wire was advanced through the multipurpose catheter across the PFO and into the left upper pulmonary vein under ICE guidance and fluoroscopy. Of note I used a JR4 5 Dala Maryam catheter to guide the Davis wire into the left upper pulmonary vein. Visualization on ICE reveals a simple PFO anatomy. A 25 mm Amplatzer PFO occluder was chosen. The 8 Malian sheath was removed over the Rosalinda wire and the 8 Dala Maryam delivery system was advanced into the mid left atrium. Disease  Cell: [phone]

## 2020-07-30 VITALS
SYSTOLIC BLOOD PRESSURE: 110 MMHG | RESPIRATION RATE: 16 BRPM | HEIGHT: 63 IN | DIASTOLIC BLOOD PRESSURE: 65 MMHG | BODY MASS INDEX: 36.14 KG/M2 | TEMPERATURE: 98.3 F | OXYGEN SATURATION: 98 % | WEIGHT: 204 LBS | HEART RATE: 84 BPM

## 2020-07-30 LAB
HCT VFR BLD CALC: 35.4 % (ref 34–48)
HEMOGLOBIN: 11.3 G/DL (ref 11.5–15.5)
MCH RBC QN AUTO: 29.7 PG (ref 26–35)
MCHC RBC AUTO-ENTMCNC: 31.9 % (ref 32–34.5)
MCV RBC AUTO: 93.2 FL (ref 80–99.9)
PDW BLD-RTO: 14.8 FL (ref 11.5–15)
PLATELET # BLD: 187 E9/L (ref 130–450)
PMV BLD AUTO: 10.7 FL (ref 7–12)
RBC # BLD: 3.8 E12/L (ref 3.5–5.5)
WBC # BLD: 7 E9/L (ref 4.5–11.5)

## 2020-07-30 PROCEDURE — 2580000003 HC RX 258: Performed by: INTERNAL MEDICINE

## 2020-07-30 PROCEDURE — G0378 HOSPITAL OBSERVATION PER HR: HCPCS

## 2020-07-30 PROCEDURE — 6370000000 HC RX 637 (ALT 250 FOR IP): Performed by: INTERNAL MEDICINE

## 2020-07-30 PROCEDURE — 93308 TTE F-UP OR LMTD: CPT

## 2020-07-30 PROCEDURE — 2700000000 HC OXYGEN THERAPY PER DAY

## 2020-07-30 PROCEDURE — 36415 COLL VENOUS BLD VENIPUNCTURE: CPT

## 2020-07-30 PROCEDURE — 85027 COMPLETE CBC AUTOMATED: CPT

## 2020-07-30 PROCEDURE — 99226 PR SBSQ OBSERVATION CARE/DAY 35 MINUTES: CPT | Performed by: INTERNAL MEDICINE

## 2020-07-30 RX ORDER — ASPIRIN 81 MG/1
81 TABLET ORAL DAILY
Qty: 30 TABLET | Refills: 3 | Status: SHIPPED | OUTPATIENT
Start: 2020-07-31

## 2020-07-30 RX ORDER — PANTOPRAZOLE SODIUM 40 MG/1
40 TABLET, DELAYED RELEASE ORAL
Qty: 30 TABLET | Refills: 3 | Status: SHIPPED | OUTPATIENT
Start: 2020-07-31 | End: 2020-09-14 | Stop reason: ALTCHOICE

## 2020-07-30 RX ORDER — LORAZEPAM 1 MG/1
1 TABLET ORAL ONCE
Status: COMPLETED | OUTPATIENT
Start: 2020-07-30 | End: 2020-07-30

## 2020-07-30 RX ADMIN — LORAZEPAM 1 MG: 1 TABLET ORAL at 08:16

## 2020-07-30 RX ADMIN — PANTOPRAZOLE SODIUM 40 MG: 40 TABLET, DELAYED RELEASE ORAL at 06:25

## 2020-07-30 RX ADMIN — SODIUM CHLORIDE, PRESERVATIVE FREE 10 ML: 5 INJECTION INTRAVENOUS at 08:16

## 2020-07-30 RX ADMIN — ASPIRIN 81 MG: 81 TABLET, COATED ORAL at 08:15

## 2020-07-30 RX ADMIN — CLOPIDOGREL BISULFATE 75 MG: 75 TABLET ORAL at 08:16

## 2020-07-30 ASSESSMENT — PAIN SCALES - GENERAL
PAINLEVEL_OUTOF10: 0

## 2020-07-30 NOTE — PROGRESS NOTES
CLINICAL PHARMACY NOTE: MEDS TO [address] Select Patient?: No  Total # of Prescriptions Filled: 2   The following medications were delivered to the patient:  · Aspirin lose dose 81 mg  · pantoprazole 40 mg  Total # of Interventions Completed: 3  Time Spent (min): 15    Additional Documentation:

## 2020-07-30 NOTE — PLAN OF CARE
Instructed on pain scale and description of pain  Patient able to communicate level of pain according to pain scale correctly  Patient able to describe pain  Educated on chest Pain and EKG

## 2020-07-30 NOTE — CARE COORDINATION
Transition of care: PFO closure on 07/29/20. Met with pt in room. Pt lives with her , Stu Beauchamp, in a 1 story home. Independent with ADLs and drives. No DME. Pt stated she already has financial papers to fill out for [facility] to see if she can get assistance with paying her hospital bill. PCP is Dr. Opal Tompkins and pharmacy is [facility] in [place]. Denies any other needs for home. Discharge order on chart.

## 2020-07-30 NOTE — PROGRESS NOTES
Patient C/O Chest Pain sent meesage to Dr Hank Olivo received orders By Phone and placed on O2 NC  initiated all orders.

## 2020-07-30 NOTE — PROGRESS NOTES
Inpatient Cardiology Follow-up      Reason for Consult: Status post PFO closure    Interval HPI:    Had some atypical chest pain last night after she had some ice water. I ordered a stat chest x-ray which showed the device in place. An EKG was also ordered and showed normal sinus rhythm was entirely normal.  Her complaint resolved after taking Maalox and adjusting her position.  No significant arrhythmia on telemetry.  TTE reviewed as below   No access site complaints      PHYSICAL EXAM:  /65   Pulse 84   Temp 98.3 °F (36.8 °C) (Temporal)   Resp 16   Ht 5' 3\" (1.6 m)   Wt 204 lb (92.5 kg)   LMP 06/20/2012   SpO2 98%   BMI 36.14 kg/m²     General Appearance:    Alert, cooperative, no distress, appears stated age   Head:    Normocephalic, without obvious abnormality, atraumatic    Eyes:    PERRL, conjunctiva/corneas clear, EOM's intact    Neck:   Supple, symmetrical, trachea midline; no carotid    bruit or JVP    Lungs:     Clear to auscultation bilaterally, respirations unlabored, no wheezing, rales or rhonchi    Heart:    Regular rate and rhythm, no murmur, rub   or gallop    Abdomen:     Soft, non-tender, non-distended    Extremities:   Extremities normal, atraumatic, no cyanosis or edema    Skin:   Skin color, texture, turgor normal for age    Neurologic:   Oriented x3, CNII-XII intact. Normal gross strength and sensation       DATA:      Intake/Output Summary (Last 24 hours) at 7/30/2020 1815  Last data filed at 7/30/2020 1408  Gross per 24 hour   Intake 1320 ml   Output 2550 ml   Net -1230 ml       Labs:   CBC:   Recent Labs     07/30/20  0355   WBC 7.0   HGB 11.3*   HCT 35.4        BMP: No results for input(s): NA, K, CO2, BUN, CREATININE, LABGLOM, CALCIUM in the last 72 hours. Invalid input(s): GLU  Mag: No results for input(s): MG in the last 72 hours. Phos: No results for input(s): PHOS in the last 72 hours.   TSH: No results for input(s): TSH in the last 72 hours. HgA1c:   Lab Results   Component Value Date    LABA1C 5.3 11/08/2019     No results found for: EAG  proBNP: No results for input(s): PROBNP in the last 72 hours. PT/INR: No results for input(s): PROTIME, INR in the last 72 hours. APTT:No results for input(s): APTT in the last 72 hours. CARDIAC ENZYMES:No results for input(s): CKTOTAL, CKMB, CKMBINDEX, TROPONINI in the last 72 hours. FASTING LIPID PANEL:  Lab Results   Component Value Date    CHOL 151 07/22/2020    HDL 77 07/22/2020    LDLCALC 59 07/22/2020    TRIG 74 07/22/2020     LIVER PROFILE:No results for input(s): AST, ALT, LABALBU in the last 72 hours. ASSESSMENT:  65-year-old female with simple migraines and a vertebrobasilar TIA in November 2019 deemed to be a cryptogenic CVA with a PFO with right-to-left shunting. She is status post PFO closure yesterday successfully with a 25 mm Amplatzer PFO occluder. 1. Status post PFO closure  2. Dyslipidemia  3.  History of TIA    PLAN:   Aspirin 81 mg p.o. daily for at least 5 years   Clopidogrel 75 mg p.o. daily for a month   High intensity statin   Follow-up with me in the office in 4 weeks with a limited TTE without bubbles   Ready for discharge    Radha Escudero MD  Interventional Cardiology/Structural Heart Disease  Cell: [phone]       Electronically signed by Radha Escudero MD on 7/30/2020 at 6:15 PM

## 2020-07-31 ENCOUNTER — TELEPHONE (OUTPATIENT)
Dept: CARDIOLOGY CLINIC | Age: 55
End: 2020-07-31

## 2020-07-31 LAB
EKG ATRIAL RATE: 63 BPM
EKG P AXIS: 60 DEGREES
EKG P-R INTERVAL: 134 MS
EKG Q-T INTERVAL: 418 MS
EKG QRS DURATION: 84 MS
EKG QTC CALCULATION (BAZETT): 427 MS
EKG R AXIS: 89 DEGREES
EKG T AXIS: 82 DEGREES
EKG VENTRICULAR RATE: 63 BPM

## 2020-07-31 NOTE — TELEPHONE ENCOUNTER
Discharge instructions:  Stop taking Tylenol 325mg tabs. She would like to use something for discomfort  Please advise    SP PFO closure  7 29 2020  Tender when walking  To groin  And when stands. Just uncomfortable   Groin area where underwear rub. Self rates a 5 on 0-10 scale   No numbness to leg  Does have bruising right groin. Urinating good. Hydrating with water. Denies s/s infection. No drainage. ASA is not helping. Is taking 81mg daily as directed.      Please advise

## 2020-07-31 NOTE — TELEPHONE ENCOUNTER
Per DR José Crespo,  Tylenol is ok to use. Follow package instructions. Use ice pack to groin first 48 hrs then use heat. Do not apply directly to skin    I have reviewed the provider's instructions with the patient, answering all questions to her satisfaction.     Aliyah Acevedo RN

## 2020-08-05 NOTE — H&P
Update History & Physical    The patient's History and Physical of July 22, 2020 was reviewed with the patient and I examined the patient. There was no change  . The surgical site was confirmed by the patient and me. Plan: The risks, benefits, expected outcome, and alternative to the recommended procedure have been discussed with the patient. Patient understands and wants to proceed with the procedure.      Electronically signed by Jesus Herzog MD on 8/5/2020 at 11:40 AM

## 2020-08-06 ENCOUNTER — TELEPHONE (OUTPATIENT)
Dept: CARDIOLOGY CLINIC | Age: 55
End: 2020-08-06

## 2020-08-06 RX ORDER — AMOXICILLIN 500 MG/1
2000 CAPSULE ORAL
Qty: 60 CAPSULE | Refills: 0 | Status: SHIPPED | OUTPATIENT
Start: 2020-08-06 | End: 2020-08-06

## 2020-08-06 NOTE — TELEPHONE ENCOUNTER
(ASA + Plavix) for 1 month. S/p PFO closure 7- . DR José Crespo will review at next office visit. Prophylactic antibiotic PRN order is in epic  Called patient and mailed letter with instructions. Groin discomfort is relieved. Called to check on patient: s/p PFO 8/7/2020 8:44 PM   Uses 3 pillows to sleep, her usual  Can feel heart skip beat every now and then when lays down. Does not notice during the day. Very mild SOB  Weight good  No chest pains. No swelling  Mild lightheaded when 1st gets up in am.   Keeping hydrated  Urine color is clear  Skin warm and dry.      BP  says was normal when checked last cant remember the actual #'s but was normal.    Aliyah Acevedo, RN

## 2020-08-10 NOTE — PROGRESS NOTES
Karthik Johns Cariashlie 1965 [id number]     Ordered 30 day event monitor. (to be placed on at office, MA's to call with time tomorrow. Left voice message for Demario Nuris coordinates the MA's to call Scooby Moctezuma to set apt time for monitor. [address] [place] [phone] (home)    Over exerted today. Swept a mop on floor today and had some chest discomfort/soreness  after, sore to her back mid back. No change when deep breaths. No other radiation. She will use tylenol for discomfort. Still has nightly \"skipping beats\" no change. Is available all day tomorrow , call [phone] (home)  to set monitor placement. Speaking in full sentences without distress. No other new c/o.

## 2020-08-11 ENCOUNTER — NURSE ONLY (OUTPATIENT)
Dept: CARDIOLOGY CLINIC | Age: 55
End: 2020-08-11

## 2020-08-11 ENCOUNTER — TELEPHONE (OUTPATIENT)
Dept: CARDIOLOGY CLINIC | Age: 55
End: 2020-08-11

## 2020-08-11 NOTE — TELEPHONE ENCOUNTER
Left voice message for Yasmin Delarosa to come at 1125am to have 30 day event monitor placed.      Requested call back to confirm   Rhiannon Garcia RN

## 2020-08-11 NOTE — TELEPHONE ENCOUNTER
Confirmed the apt today for monitor application. S/P PFO closure 7 29 2020    She is asking DR if it is ok to travel   She would like to go to [place] labor day weekend. Plan to fly, and will be in [facility]?     covid 19 panepidemic, unsure if [place] will be hot spot at that time.

## 2020-08-11 NOTE — TELEPHONE ENCOUNTER
Antonina Salas MD  You 2 hours ago (1:10 PM)       Absolutely yes. Message text          Notified Kobe Chawla of it is ok to go to [place].

## 2020-08-19 ENCOUNTER — TELEPHONE (OUTPATIENT)
Dept: CARDIOLOGY | Age: 55
End: 2020-08-19

## 2020-08-19 NOTE — TELEPHONE ENCOUNTER
Left a message on patients home phone reminder of appointment on 8/25/20 at 7:45 for a Regular stress test instructions and pre-procedure checklist reviewed asked to call back regarding COVID checklist

## 2020-08-21 PROBLEM — Z01.818 PRE-OP EVALUATION: Status: RESOLVED | Noted: 2020-07-22 | Resolved: 2020-08-21

## 2020-08-25 ENCOUNTER — HOSPITAL ENCOUNTER (OUTPATIENT)
Dept: CARDIOLOGY | Age: 55
Discharge: HOME | End: 2020-08-25
Payer: COMMERCIAL

## 2020-08-25 VITALS
SYSTOLIC BLOOD PRESSURE: 94 MMHG | HEIGHT: 63 IN | DIASTOLIC BLOOD PRESSURE: 66 MMHG | HEART RATE: 60 BPM | WEIGHT: 207 LBS | BODY MASS INDEX: 36.68 KG/M2 | TEMPERATURE: 97.7 F

## 2020-08-25 PROCEDURE — 93017 CV STRESS TEST TRACING ONLY: CPT

## 2020-08-25 NOTE — PROCEDURES
[address].[facility]. Wiley Zazueta [address] [place], [place] [phone]    Exercise Stress Study       Name: [address] [place] [address] Road Account Number:  [de-identified]      :  1965          Sex: female         Date of Study:  2020    Height: 5' 3\" (160 cm)          Weight: 207 lb (93.9 kg)    Ordering Provider: Lennox Lares. Antonella Cohn MD          PCP: LONNY Hernandez    Cardiologist: Lennox Lares. Antonella Cohn MD              Interpreting Physician: Angelina Nettles MD    Indication:   Detecting the presence and location of coronary artery disease    Clinical History:   Patient has no known history of coronary artery disease. Resting ECG:    Sinus rhythm    Exercise: The patient exercised using a Micah protocol, completing 3.16  minutes and reaching an estimated work load of 5.0 metabolic equivalents (METS). Resting HR was 69. Peak exercise heart rate was 130 ( 83% of maximum predicted heart rate for age). Baseline BP 94/66. Peak exercise /69. The blood pressure response to exercise was normal      Exercise was terminated due to dyspnea, fatigue and heart rate attained. The patient experienced no chest pain with exercise. Pulse oximetry was used to monitor oxygen saturation during the stress test.  The study was performed on Room Air. The resting pulse oximeter was 98%. The post stress O2 saturation seen during exercise was 98 %. Exercise ECG:   The patient demonstrated isolated VPC's during exercise. With exercise, there were no ST segment changes of significance at the heart rate achieved. Impression:    1. Exercise EKG was normal; Achieved 83% of THR and 5 METS - Test ended early due to shortness of breath. 2. The patient experienced no chest pain with exercise. 3. Ponce treadmill score was 3 implying intermediate risk of acute ischemic events. 4. Exercise capacity was impaired.      No recent study to compare. Thank you for sending your patient to this [employer].     Electronically signed by Mark Kent MD on 8/26/2020 at 8:04 AM

## 2020-08-26 ENCOUNTER — TELEPHONE (OUTPATIENT)
Dept: CARDIOLOGY CLINIC | Age: 55
End: 2020-08-26

## 2020-08-27 ENCOUNTER — TELEPHONE (OUTPATIENT)
Dept: CARDIOLOGY | Age: 55
End: 2020-08-27

## 2020-08-31 ENCOUNTER — HOSPITAL ENCOUNTER (OUTPATIENT)
Dept: CARDIOLOGY | Age: 55
Discharge: HOME | End: 2020-08-31
Payer: COMMERCIAL

## 2020-08-31 PROCEDURE — 93308 TTE F-UP OR LMTD: CPT

## 2020-09-08 ENCOUNTER — TELEPHONE (OUTPATIENT)
Dept: CARDIOLOGY CLINIC | Age: 55
End: 2020-09-08

## 2020-09-08 NOTE — TELEPHONE ENCOUNTER
----- Message from Flavia Fulton MD sent at 9/3/2020  9:41 PM EDT -----  Any news on her? TTE looks excellent. Thanks.

## 2020-09-08 NOTE — TELEPHONE ENCOUNTER
PFO occluder well-seated without shunt by color. Normal left ventricle size. Estimated left ventricle ejection fraction 55   %. Normal right ventricular size and function. No evidence of pericardial effusion      Updated Quynh on results of TTE limited without bubble. Feels good   No questions  Keeping DR Bauman's upcoming visit    Returned from her vacation .       Dolly Miller

## 2020-09-11 ENCOUNTER — TELEPHONE (OUTPATIENT)
Dept: CARDIOLOGY CLINIC | Age: 55
End: 2020-09-11

## 2020-09-11 NOTE — TELEPHONE ENCOUNTER
Cassi Gold 1965 was called per DR Gatito Stewart instruction with update on event monitor left voice message. Option to add BB  Depending on how she feels. No med changes to be made at present.  Will be seen   in office next week to discuss all recent test results with   In person    Marco Gardner RN

## 2020-09-14 ENCOUNTER — OFFICE VISIT (OUTPATIENT)
Dept: CARDIOLOGY CLINIC | Age: 55
End: 2020-09-14
Payer: COMMERCIAL

## 2020-09-14 VITALS
DIASTOLIC BLOOD PRESSURE: 56 MMHG | WEIGHT: 211 LBS | SYSTOLIC BLOOD PRESSURE: 102 MMHG | RESPIRATION RATE: 16 BRPM | HEART RATE: 63 BPM | HEIGHT: 63 IN | BODY MASS INDEX: 37.39 KG/M2

## 2020-09-14 PROBLEM — I49.3 PVC (PREMATURE VENTRICULAR CONTRACTION): Status: ACTIVE | Noted: 2020-09-14

## 2020-09-14 PROBLEM — Z87.74 S/P PATENT FORAMEN OVALE CLOSURE: Status: ACTIVE | Noted: 2020-09-14

## 2020-09-14 PROBLEM — R53.83 FATIGUE: Status: ACTIVE | Noted: 2020-09-14

## 2020-09-14 PROCEDURE — 93000 ELECTROCARDIOGRAM COMPLETE: CPT | Performed by: INTERNAL MEDICINE

## 2020-09-14 PROCEDURE — 99214 OFFICE O/P EST MOD 30 MIN: CPT | Performed by: INTERNAL MEDICINE

## 2020-09-14 RX ORDER — METOPROLOL SUCCINATE 25 MG
25 TABLET, EXTENDED RELEASE 24 HR ORAL DAILY
Qty: 30 TABLET | Refills: 2
Start: 2020-09-14 | End: 2020-12-09

## 2020-09-14 RX ORDER — OMEPRAZOLE 10 MG/1
10 CAPSULE, DELAYED RELEASE ORAL DAILY
COMMUNITY
Start: 2020-08-17 | End: 2020-10-26 | Stop reason: ALTCHOICE

## 2020-09-14 RX ORDER — CITALOPRAM 20 MG/1
TABLET ORAL
COMMUNITY
Start: 2020-08-05 | End: 2021-01-22 | Stop reason: ALTCHOICE

## 2020-09-14 NOTE — PATIENT INSTRUCTIONS
1. Stop Plavix  2. Stop Lipitor  3. Start Toprol 25 mg once daily  4. Continue aspirin  5. Continue prophylactic antibiotics for the first six months after the procedure  6.  Return in 5 months with echo

## 2020-09-14 NOTE — PROGRESS NOTES
[facility]   [facility]   Clinic Note     Date:9/14/20   Patient Name:Quynh Gold  YOB: 1965  Age: 53 y.o. Primary Care Provider: LONNY Wang    Subjective     This is a pleasant 76-year-old  female who returns for follow-up. She is followed longitudinally by Dr. Governor Oswald. She is status post PFO closure on July 29, 2020. She has been doing well. She has complained of palpitations post procedure which she has had no weakness or slurred speech or memory deficits or vision changes. She has not had any significant bleeding on aspirin and Plavix. She reports compliance with her medications otherwise. She has no complaints related to the groin access site. She continues to complain of chronic fatigue. She denies chest discomfort, orthopnea, PND, lower extremity edema, syncope, presyncope , shortness of breath. A focused history review includes:  1. 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 RoPE score 6  2. Status post percutaneous PFO closure with 25 mm PFO occluder on July 29, 2020.  1. Postop day 1, postop day 30 TTE without concerns  2. 30-day event monitor post closure for palpitations without significant arrhythmia  3. Exercise stress test in August 2020 was negative for EKG changes at 83% maximum predicted heart rate. Impaired functional capacity at 5 minutes. 4. Simple migraine  5.  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    Blood transfusion     Carotid stenosis     Cerebral artery occlusion with cerebral infarction (HCC)     Fibromyalgia     History of blood transfusion     Hyperlipidemia     Lupus (HCC)     questionable    Pelvic peritoneal adhesions, female (postoperative) (postinfection) 7/16/2012    Right leg pain     Seizure [facility])     last one 30 yrs ago- h/o epilepsy as a child    Unspecified symptom associated with female genital organs 7/16/2012    Ureteral obstruction as a child had surgery to correct           Social History:    Social History     Tobacco History     Smoking Status  Never Smoker    Smokeless Tobacco Use  Never Used          Alcohol History     Alcohol Use Status  Yes Comment  occasionally.   2 coffee per day          Drug Use     Drug Use Status  Never          Sexual Activity     Sexually Active  Not Asked                    Family History:       Problem Relation Age of Onset    Heart Attack Mother     Alzheimer's Disease Mother     Breast Cancer Mother     Cancer Father         skin rare form     Diabetes Father     No Known Problems Sister     No Known Problems Brother     No Known Problems Sister     COPD Sister     Diabetes Brother     No Known Problems Brother          Review of Systems   General ROS: No weight loss fevers or chills  Psychological ROS: No new depression or anxiety or altered mood  Ophthalmic ROS: No new vision changes or diplopia  Respiratory ROS: No cough, wheezing, shortness of breath, or hemoptysis  Cardiovascular ROS: See HPI  Gastrointestinal ROS: No nausea, vomiting, constipation, diarrhea, hematemesis, melena, or hematochezia  Genito-Urinary ROS: No dysuria, hematuria, or new incontinence  Musculoskeletal ROS: No new muscle pain, joint pain, joint swelling, or back pain  Neurological ROS: No new numbness or paresthesias, no focal weakness, no altered speech, no new memory loss  Dermatological ROS: No new rashes, no pruritus, no skin masses        Medications     Current Outpatient Medications   Medication Sig Dispense Refill    omeprazole (PRILOSEC) 10 MG delayed release capsule Take 10 mg by mouth daily       citalopram (CELEXA) 20 MG tablet take 1 tablet by mouth once daily      TOPROL XL 25 MG extended release tablet Take 1 tablet by mouth daily 30 tablet 2    aspirin (PX ENTERIC ASPIRIN) 81 MG EC tablet Take 1 tablet by mouth daily 30 tablet 3    Multiple Vitamin (MULTI-VITAMIN DAILY PO) Take by mouth daily       No current facility-administered medications for this visit. Physical Examination      BP (!) 102/56   Pulse 63   Resp 16   Ht 5' 3\" (1.6 m)   Wt 211 lb (95.7 kg)   LMP 06/20/2012   BMI 37.38 kg/m²     General: No acute distress, appears as stated age, nonicteric  Head: Atraumatic, no gross abnormalities or bruises  Neck: Supple and nontender, no carotid bruits, no JVP  Lungs: Clear to auscultation bilaterally, no wheezes, rales, or rhonchi  Heart: Regular rate and rhythm, no murmurs, rubs, or gallops  Abdomen: Soft, nontender, nondistended, normal bowel sounds  Extremities: No obvious deformities, no cyanosis, no edema  Neurological: Alert and oriented x3, EOMI, moving all extremities x4  Psychological: Normal mood and affect, cooperative  Skin: Color, texture, and turgor normal for age          Labs/Imaging/Diagnostics     Lab Results   Component Value Date     07/22/2020    K 4.3 07/22/2020     07/22/2020    CO2 28 07/22/2020    BUN 13 07/22/2020    CREATININE 0.7 07/22/2020    GLUCOSE 99 07/22/2020    GLUCOSE 90 08/11/2011    CALCIUM 9.6 07/22/2020        Estimated Creatinine Clearance: 100 mL/min (based on SCr of 0.7 mg/dL).     Lab Results   Component Value Date    WBC 7.0 07/30/2020    HGB 11.3 (L) 07/30/2020    HCT 35.4 07/30/2020    MCV 93.2 07/30/2020     07/30/2020       Lab Results   Component Value Date    ALT 12 11/09/2019    AST 16 11/09/2019    ALKPHOS 59 11/09/2019    BILITOT 0.3 11/09/2019       Lab Results   Component Value Date    LABALBU 3.8 11/09/2019       Lab Results   Component Value Date    CHOL 151 07/22/2020    CHOL 200 (H) 11/08/2019     Lab Results   Component Value Date    TRIG 74 07/22/2020    TRIG 89 11/08/2019     Lab Results   Component Value Date    HDL 77 07/22/2020    HDL 77 11/08/2019 Lab Results   Component Value Date    LDLCALC 59 07/22/2020    [address] (H) 11/08/2019     Lab Results   Component Value Date    LABVLDL 15 07/22/2020    LABVLDL 18 11/08/2019     No results found for: CHOLHDLRATIO    No results found for: CKTOTAL, CKMB, CKMBINDEX, TROPONINI    No results found for: BNP      Lab Results   Component Value Date    LABA1C 5.3 11/08/2019     No results found for: EAG     Pertinent Cardiovascular Studies:  EKG: Today personally interpreted  Normal sinus rhythm, normal otherwise        Assessment and Plan:        Very pleasant 19-year-old  female with a history of left. She is recovering wonderfully status post PFO closure and has not had any recurrent neurological symptoms. She has occasional PACs and PVCs documented on 30-day monitor. She requests treatment for this. I will start her on a low-dose beta-blocker and see how she feels. If those are related to the PFO closure then they should subside over the next month or so. Diagnosis Orders   1. S/P patent foramen ovale closure  ECHO LIMITED   2. PVC (premature ventricular contraction)  TOPROL XL 25 MG extended release tablet   3. Fatigue, unspecified type  CBC WITH AUTO DIFFERENTIAL      · CBC for her fatigue. She has been mildly anemic in the past is probably due for colonoscopy. · Start Toprol-XL 25 mg p.o. daily  · Stop Plavix and continue aspirin 81 mg p.o. daily for now  · Continue endocarditis prophylaxis before high-risk procedures through 6 months post PFO closure  · Follow-up with Dr. Eyad Bond as scheduled     Follow up with me at the 6-month isac with TTE with bubbles. We appreciate the opportunity to participate in Her care!       Pretty Vick MD  Interventional Cardiology/Structural Heart Disease  Cell: [phone]

## 2020-09-30 ENCOUNTER — HOSPITAL ENCOUNTER (OUTPATIENT)
Age: 55
Discharge: HOME | End: 2020-09-30
Payer: COMMERCIAL

## 2020-09-30 LAB
BASOPHILS ABSOLUTE: 0.03 E9/L (ref 0–0.2)
BASOPHILS RELATIVE PERCENT: 0.5 % (ref 0–2)
EOSINOPHILS ABSOLUTE: 0.21 E9/L (ref 0.05–0.5)
EOSINOPHILS RELATIVE PERCENT: 3.4 % (ref 0–6)
HCT VFR BLD CALC: 37.5 % (ref 34–48)
HEMOGLOBIN: 11.8 G/DL (ref 11.5–15.5)
IMMATURE GRANULOCYTES #: 0.03 E9/L
IMMATURE GRANULOCYTES %: 0.5 % (ref 0–5)
LYMPHOCYTES ABSOLUTE: 1.93 E9/L (ref 1.5–4)
LYMPHOCYTES RELATIVE PERCENT: 30.9 % (ref 20–42)
MCH RBC QN AUTO: 29.5 PG (ref 26–35)
MCHC RBC AUTO-ENTMCNC: 31.5 % (ref 32–34.5)
MCV RBC AUTO: 93.8 FL (ref 80–99.9)
MONOCYTES ABSOLUTE: 0.58 E9/L (ref 0.1–0.95)
MONOCYTES RELATIVE PERCENT: 9.3 % (ref 2–12)
NEUTROPHILS ABSOLUTE: 3.46 E9/L (ref 1.8–7.3)
NEUTROPHILS RELATIVE PERCENT: 55.4 % (ref 43–80)
PDW BLD-RTO: 14.1 FL (ref 11.5–15)
PLATELET # BLD: 249 E9/L (ref 130–450)
PMV BLD AUTO: 10.1 FL (ref 7–12)
RBC # BLD: 4 E12/L (ref 3.5–5.5)
WBC # BLD: 6.2 E9/L (ref 4.5–11.5)

## 2020-09-30 PROCEDURE — 85025 COMPLETE CBC W/AUTO DIFF WBC: CPT

## 2020-09-30 PROCEDURE — 36415 COLL VENOUS BLD VENIPUNCTURE: CPT

## 2020-10-12 ENCOUNTER — TELEPHONE (OUTPATIENT)
Dept: CARDIOLOGY CLINIC | Age: 55
End: 2020-10-12

## 2020-10-12 NOTE — TELEPHONE ENCOUNTER
Dizzy / lightheaded  X 1 week    Inc SOB with ambulating or talking a lot    Feels like going to pass out  Standing so she sits with relief. Chronic fatigue  No change  Weight:   211# today  (lost 5#) per patient  Urine color: lemonade   Few times felt like UTI. Used Azo for the burning. Urgency to urinate:  Yes  Voiding good amount  Denies fever   chills  Pain: no chest pain  Denies any change to the \"skipping heart beats she normally has\". Flank pain last night. Not today. BP today  10/12/2020   BP 96/56     (this is her normal)       Keeping hydrated and ate prior to taking meds.   This am very lightheaded/    Pale  Skin tones  (skin warm and dry)         Vitals 9/14/2020 2/30/2381   SYSTOLIC 940 94   DIASTOLIC 56 66   Site     Position     Cuff Size     Pulse 63 60   Temp  97.7   Resp 16    SpO2     Weight 211 lb 207 lb   Height 5' 3\" 5' 3\"   Body mass index 37.37 kg/m2 36.66 kg/m2   Pain Level       Results for Yarelis Vu (MRN [id number]) as of 10/12/2020 14:55   7/22/2020 10:28   Sodium 146   Potassium 4.3   Chloride 108 (H)   CO2 28   BUN 13   Creatinine 0.7   Anion Gap 10   GFR Non- >60   GFR African American >60   Glucose 99   Calcium 9.6   Cholesterol, Total 151   HDL Cholesterol 77   LDL Calculated 59   Triglycerides 74   VLDL Cholesterol Calculated 15   Results for Yarelis Vu (MRN [id number]) as of 10/12/2020 14:55   9/30/2020 15:56   WBC 6.2   RBC 4.00   Hemoglobin Quant 11.8   Hematocrit 37.5   MCV 93.8   MCH 29.5   MCHC 31.5 (L)   MPV 10.1   RDW 14.1   Platelet Count 273   Neutrophils % 55.4   Immature Granulocytes % 0.5   Lymphocyte % 30.9   Monocytes % 9.3   Eosinophils % 3.4   Basophils % 0.5   Neutrophils Absolute 3.46   Immature Granulocytes # 0.03   Lymphocytes Absolute 1.93   Monocytes Absolute 0.58   Eosinophils Absolute 0.21   Basophils Absolute 0.03

## 2020-10-12 NOTE — TELEPHONE ENCOUNTER
Lets see about getting her to cardiac rehab. I think she could use some monitored physical activity and reassurance.

## 2020-10-12 NOTE — TELEPHONE ENCOUNTER
Hydrate ,change position slowly. Agreeable to cardiac rehab. Would like to use [facility]. Order placed. She will f/u with PCP for burning urine s/s.      Adenike Garcia RN

## 2020-10-24 ENCOUNTER — HOSPITAL ENCOUNTER (OUTPATIENT)
Dept: MAMMOGRAPHY | Age: 55
Discharge: HOME | End: 2020-10-26
Payer: COMMERCIAL

## 2020-10-24 PROCEDURE — 77063 BREAST TOMOSYNTHESIS BI: CPT

## 2020-10-26 ENCOUNTER — HOSPITAL ENCOUNTER (OUTPATIENT)
Dept: CARDIAC REHAB | Age: 55
Setting detail: THERAPIES SERIES
Discharge: HOME | End: 2020-10-26
Payer: COMMERCIAL

## 2020-10-26 RX ORDER — ATORVASTATIN CALCIUM 40 MG/1
40 TABLET, FILM COATED ORAL DAILY
COMMUNITY
End: 2022-08-19 | Stop reason: SDUPTHER

## 2020-11-04 ENCOUNTER — HOSPITAL ENCOUNTER (OUTPATIENT)
Dept: ULTRASOUND IMAGING | Age: 55
Discharge: HOME | End: 2020-11-04
Payer: COMMERCIAL

## 2020-11-04 ENCOUNTER — HOSPITAL ENCOUNTER (OUTPATIENT)
Dept: MAMMOGRAPHY | Age: 55
Discharge: HOME | End: 2020-11-06
Payer: COMMERCIAL

## 2020-11-04 PROCEDURE — 76642 ULTRASOUND BREAST LIMITED: CPT

## 2020-11-04 PROCEDURE — G0279 TOMOSYNTHESIS, MAMMO: HCPCS

## 2020-12-09 RX ORDER — METOPROLOL SUCCINATE 25 MG/1
TABLET, EXTENDED RELEASE ORAL
Qty: 30 TABLET | Refills: 2 | Status: SHIPPED
Start: 2020-12-09 | End: 2021-03-12

## 2020-12-21 ENCOUNTER — TELEPHONE (OUTPATIENT)
Dept: CARDIOLOGY CLINIC | Age: 55
End: 2020-12-21

## 2020-12-28 NOTE — TELEPHONE ENCOUNTER
Called PT let her know per Dr. Phill Kenny from a cardiac standpoint she is fine to receive the COVID vaccine.  She had no further questions

## 2021-01-21 ENCOUNTER — TELEPHONE (OUTPATIENT)
Dept: CARDIOLOGY | Age: 56
End: 2021-01-21

## 2021-01-22 ENCOUNTER — OFFICE VISIT (OUTPATIENT)
Dept: CARDIOLOGY CLINIC | Age: 56
End: 2021-01-22
Payer: COMMERCIAL

## 2021-01-22 ENCOUNTER — HOSPITAL ENCOUNTER (OUTPATIENT)
Dept: CARDIOLOGY | Age: 56
Discharge: HOME | End: 2021-01-22
Payer: COMMERCIAL

## 2021-01-22 VITALS
RESPIRATION RATE: 16 BRPM | SYSTOLIC BLOOD PRESSURE: 110 MMHG | WEIGHT: 227.6 LBS | HEIGHT: 63 IN | DIASTOLIC BLOOD PRESSURE: 78 MMHG | BODY MASS INDEX: 40.33 KG/M2 | OXYGEN SATURATION: 98 % | HEART RATE: 49 BPM

## 2021-01-22 DIAGNOSIS — Z87.74 S/P PERCUTANEOUS PATENT FORAMEN OVALE CLOSURE: Primary | ICD-10-CM

## 2021-01-22 DIAGNOSIS — I49.9 IRREGULAR HEART BEAT: ICD-10-CM

## 2021-01-22 DIAGNOSIS — Q21.12 PFO (PATENT FORAMEN OVALE): ICD-10-CM

## 2021-01-22 DIAGNOSIS — Z87.74 S/P PATENT FORAMEN OVALE CLOSURE: ICD-10-CM

## 2021-01-22 DIAGNOSIS — I49.3 PVC (PREMATURE VENTRICULAR CONTRACTION): ICD-10-CM

## 2021-01-22 PROCEDURE — 99213 OFFICE O/P EST LOW 20 MIN: CPT | Performed by: INTERNAL MEDICINE

## 2021-01-22 PROCEDURE — 3017F COLORECTAL CA SCREEN DOC REV: CPT | Performed by: INTERNAL MEDICINE

## 2021-01-22 PROCEDURE — 2580000003 HC RX 258: Performed by: INTERNAL MEDICINE

## 2021-01-22 PROCEDURE — 93000 ELECTROCARDIOGRAM COMPLETE: CPT | Performed by: INTERNAL MEDICINE

## 2021-01-22 PROCEDURE — 1036F TOBACCO NON-USER: CPT | Performed by: INTERNAL MEDICINE

## 2021-01-22 PROCEDURE — G8484 FLU IMMUNIZE NO ADMIN: HCPCS | Performed by: INTERNAL MEDICINE

## 2021-01-22 PROCEDURE — 93308 TTE F-UP OR LMTD: CPT

## 2021-01-22 PROCEDURE — G8427 DOCREV CUR MEDS BY ELIG CLIN: HCPCS | Performed by: INTERNAL MEDICINE

## 2021-01-22 PROCEDURE — G8417 CALC BMI ABV UP PARAM F/U: HCPCS | Performed by: INTERNAL MEDICINE

## 2021-01-22 RX ORDER — SODIUM CHLORIDE 0.9 % (FLUSH) 0.9 %
10 SYRINGE (ML) INJECTION PRN
Status: DISCONTINUED | OUTPATIENT
Start: 2021-01-22 | End: 2021-01-23 | Stop reason: HOSPADM

## 2021-01-22 RX ADMIN — SODIUM CHLORIDE, PRESERVATIVE FREE 10 ML: 5 INJECTION INTRAVENOUS at 08:29

## 2021-01-22 NOTE — PATIENT INSTRUCTIONS
1. Continue current medications  2.  Antibiotic prophylaxis through July 2021 from my standpoint  3. Echocardiogram in July and see you afterwards

## 2021-01-22 NOTE — PROGRESS NOTES
[address] [facility]   Heart and Vascular [place]   Clinic Note     Date:1/22/21   Patient Name:Quynh Gold  YOB: 1965  Age: 63 y.o. Primary Care Provider: LONNY Duncan    Subjective     This is a pleasant 43-year-old  female who returns for follow-up. She is followed longitudinally by Dr. Florencio Mojica. She is status post PFO closure on July 29, 2020. She has been doing well. She has complained of palpitations post procedure which she has had no weakness or slurred speech or memory deficits or vision changes. She has not had any significant bleeding on aspirin and Plavix. She reports compliance with her medications otherwise. She has no complaints related to the groin access site. She continues to complain of chronic fatigue. She denies chest discomfort, orthopnea, PND, lower extremity edema, syncope, presyncope , shortness of breath. She is doing very well. Since starting Toprol her palpitations resolved and she would like to continue on it. She has not had any CP, SOB, edema, orthopnea, PND, syncope/presyncope. She is on aspirin as a single antithrombotic. A focused history review includes:  1. 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 RoPE score 6  2. Status post percutaneous PFO closure with 25 mm PFO occluder on July 29, 2020.  1. Postop day 1, postop day 30 TTE without concerns  2. 30-day event monitor post closure for palpitations without significant arrhythmia  3. 6-month TTE personally reviewed today: Well-seated PFO occluder without shunt by bubbles or color and no other gross abnormality. 3. Exercise stress test in August 2020 was negative for EKG changes at 83% maximum predicted heart rate. Impaired functional capacity at 5 minutes. 4. Simple migraine  5.  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    Blood transfusion     Carotid stenosis     Cerebral artery occlusion with cerebral infarction (HCC)     Fibromyalgia     History of blood transfusion     Hyperlipidemia     Lupus (HCC)     questionable    Pelvic peritoneal adhesions, female (postoperative) (postinfection) 7/16/2012    Right leg pain     Seizure ([address]     last one 30 yrs ago- h/o epilepsy as a child    Unspecified symptom associated with female genital organs 7/16/2012    Ureteral obstruction as a child had surgery to correct           Social History:    Social History     Tobacco History     Smoking Status  Never Smoker    Smokeless Tobacco Use  Never Used          Alcohol History     Alcohol Use Status  Yes Comment  occasionally.   2 coffee per day          Drug Use     Drug Use Status  Never          Sexual Activity     Sexually Active  Not Asked                    Family History:       Problem Relation Age of Onset    Heart Attack Mother    Ayla Zuleta Alzheimer's Disease Mother     Breast Cancer Mother     Cancer Father         skin rare form     Diabetes Father     No Known Problems Sister     No Known Problems Brother     No Known Problems Sister     COPD Sister     Diabetes Brother     No Known Problems Brother          Review of Systems   General ROS: No weight loss fevers or chills  Psychological ROS: No new depression or anxiety or altered mood  Ophthalmic ROS: No new vision changes or diplopia  Respiratory ROS: No cough, wheezing, shortness of breath, or hemoptysis  Cardiovascular ROS: See HPI  Gastrointestinal ROS: No nausea, vomiting, constipation, diarrhea, hematemesis, melena, or hematochezia  Genito-Urinary ROS: No dysuria, hematuria, or new incontinence  Musculoskeletal ROS: No new muscle pain, joint pain, joint swelling, or back pain  Neurological ROS: No new numbness or paresthesias, no focal weakness, no altered speech, no new memory loss  Dermatological ROS: No new rashes, no pruritus, no skin masses        Medications     Current Outpatient Medications   Medication Sig Dispense Refill    metoprolol succinate (TOPROL XL) 25 MG extended release tablet take 1 tablet by mouth once daily 30 tablet 2    atorvastatin (LIPITOR) 40 MG tablet Take 40 mg by mouth daily      aspirin (PX ENTERIC ASPIRIN) 81 MG EC tablet Take 1 tablet by mouth daily 30 tablet 3    Multiple Vitamin (MULTI-VITAMIN DAILY PO) Take by mouth daily       No current facility-administered medications for this visit.       Facility-Administered Medications Ordered in Other Visits   Medication Dose Route Frequency Provider Last Rate Last Admin    sodium chloride flush 0.9 % injection 10 mL  10 mL Intravenous PRN Abbey Alpers, MD   10 mL at 01/22/21 9431           Physical Examination      /78 (Site: Right Upper Arm, Position: Sitting, Cuff Size: Large Adult)   Pulse (!) 49   Resp 16   Ht 5' 3\" (1.6 m)   Wt 227 lb 9.6 oz (103.2 kg)   LMP 06/20/2012   SpO2 98%   BMI 40.32 kg/m²     General: No acute distress, appears as stated age, nonicteric  Head: Atraumatic, no gross abnormalities or bruises  Neck: Supple and nontender, no carotid bruits, no JVP  Lungs: Clear to auscultation bilaterally, no wheezes, rales, or rhonchi  Heart: Regular rate and rhythm, no murmurs, rubs, or gallops  Abdomen: Soft, nontender, nondistended, normal bowel sounds  Extremities: No obvious deformities, no cyanosis, no edema  Neurological: Alert and oriented x3, EOMI, moving all extremities x4  Psychological: Normal mood and affect, cooperative  Skin: Color, texture, and turgor normal for age          Labs/Imaging/Diagnostics     Lab Results   Component Value Date     07/22/2020    K 4.3 07/22/2020     07/22/2020    CO2 28 07/22/2020    BUN 13 07/22/2020    CREATININE 0.7 07/22/2020    GLUCOSE 99 07/22/2020    GLUCOSE 90 08/11/2011    CALCIUM 9.6 07/22/2020        CrCl cannot be calculated (Patient's most recent lab result is older than the maximum 120 days allowed. ). Lab Results   Component Value Date    WBC 6.2 09/30/2020    HGB 11.8 09/30/2020    HCT 37.5 09/30/2020    MCV 93.8 09/30/2020     09/30/2020       Lab Results   Component Value Date    ALT 12 11/09/2019    AST 16 11/09/2019    ALKPHOS 59 11/09/2019    BILITOT 0.3 11/09/2019       Lab Results   Component Value Date    LABALBU 3.8 11/09/2019       Lab Results   Component Value Date    CHOL 151 07/22/2020    CHOL 200 (H) 11/08/2019     Lab Results   Component Value Date    TRIG 74 07/22/2020    TRIG 89 11/08/2019     Lab Results   Component Value Date    HDL 77 07/22/2020    HDL 77 11/08/2019     Lab Results   Component Value Date    LDLCALC 59 07/22/2020    LDLCALC 105 (H) 11/08/2019     Lab Results   Component Value Date    LABVLDL 15 07/22/2020    LABVLDL 18 11/08/2019         Lab Results   Component Value Date    LABA1C 5.3 11/08/2019     No results found for: EAG     Pertinent Cardiovascular Studies:  EKG: Today personally interpreted  Sinus bradycardia. Otherwise normal        Assessment and Plan:        Very pleasant 53-year-old  female with the history above. She has done very well post PFO closure and has not had any recurrent neurological symptoms. She is currently on aspirin monotherapy for antithrombotic. Her bubble study today is negative. Diagnosis Orders   1. S/P percutaneous patent foramen ovale closure  ECHO LIMITED   2. PVC (premature ventricular contraction)        · Continue Toprol-XL 25 mg p.o. daily  · continue aspirin 81 mg p.o. daily for at least 3 years post PFO closure  · Continue endocarditis prophylaxis before high-risk procedures through 12 months post PFO closure  · Follow-up with Dr. Braden Zarco as scheduled     Follow up with me at the 12-month isac with limited TTE without bubbles    We appreciate the opportunity to participate in Her care!       Abbey Alpers, MD  Interventional Cardiology/Structural Heart Disease

## 2021-03-12 DIAGNOSIS — I49.3 PVC (PREMATURE VENTRICULAR CONTRACTION): ICD-10-CM

## 2021-03-12 RX ORDER — METOPROLOL SUCCINATE 25 MG/1
TABLET, EXTENDED RELEASE ORAL
Qty: 30 TABLET | Refills: 2 | Status: SHIPPED
Start: 2021-03-12 | End: 2021-06-23

## 2021-05-20 ENCOUNTER — APPOINTMENT (OUTPATIENT)
Dept: GENERAL RADIOLOGY | Age: 56
End: 2021-05-20
Payer: COMMERCIAL

## 2021-05-20 ENCOUNTER — HOSPITAL ENCOUNTER (EMERGENCY)
Age: 56
Discharge: HOME | End: 2021-05-20
Attending: EMERGENCY MEDICINE
Payer: COMMERCIAL

## 2021-05-20 ENCOUNTER — TELEPHONE (OUTPATIENT)
Dept: CARDIOLOGY CLINIC | Age: 56
End: 2021-05-20

## 2021-05-20 VITALS
HEART RATE: 67 BPM | WEIGHT: 214 LBS | RESPIRATION RATE: 20 BRPM | SYSTOLIC BLOOD PRESSURE: 116 MMHG | DIASTOLIC BLOOD PRESSURE: 69 MMHG | TEMPERATURE: 97.9 F | OXYGEN SATURATION: 97 % | BODY MASS INDEX: 37.92 KG/M2 | HEIGHT: 63 IN

## 2021-05-20 DIAGNOSIS — R00.2 PALPITATIONS: ICD-10-CM

## 2021-05-20 DIAGNOSIS — R60.9 DEPENDENT EDEMA: Primary | ICD-10-CM

## 2021-05-20 LAB
ANION GAP SERPL CALCULATED.3IONS-SCNC: 10 MMOL/L (ref 7–16)
BASOPHILS ABSOLUTE: 0 E9/L (ref 0–0.2)
BASOPHILS RELATIVE PERCENT: 0 % (ref 0–2)
BUN BLDV-MCNC: 12 MG/DL (ref 6–20)
CALCIUM SERPL-MCNC: 9.6 MG/DL (ref 8.6–10.2)
CHLORIDE BLD-SCNC: 104 MMOL/L (ref 98–107)
CO2: 26 MMOL/L (ref 22–29)
CREAT SERPL-MCNC: 0.6 MG/DL (ref 0.5–1)
D DIMER: 201 NG/ML DDU
EOSINOPHILS ABSOLUTE: 0.13 E9/L (ref 0.05–0.5)
EOSINOPHILS RELATIVE PERCENT: 2 % (ref 0–6)
GFR AFRICAN AMERICAN: >60
GFR NON-AFRICAN AMERICAN: >60 ML/MIN/1.73
GLUCOSE BLD-MCNC: 89 MG/DL (ref 74–99)
HCT VFR BLD CALC: 36.6 % (ref 34–48)
HEMOGLOBIN: 12 G/DL (ref 11.5–15.5)
LYMPHOCYTES ABSOLUTE: 2.18 E9/L (ref 1.5–4)
LYMPHOCYTES RELATIVE PERCENT: 34 % (ref 20–42)
MCH RBC QN AUTO: 29.8 PG (ref 26–35)
MCHC RBC AUTO-ENTMCNC: 32.8 % (ref 32–34.5)
MCV RBC AUTO: 90.8 FL (ref 80–99.9)
METAMYELOCYTES RELATIVE PERCENT: 2 % (ref 0–1)
MONOCYTES ABSOLUTE: 0.51 E9/L (ref 0.1–0.95)
MONOCYTES RELATIVE PERCENT: 8 % (ref 2–12)
MYELOCYTE PERCENT: 1 % (ref 0–0)
NEUTROPHILS ABSOLUTE: 3.58 E9/L (ref 1.8–7.3)
NEUTROPHILS RELATIVE PERCENT: 53 % (ref 43–80)
PDW BLD-RTO: 14 FL (ref 11.5–15)
PLATELET # BLD: 254 E9/L (ref 130–450)
PMV BLD AUTO: 9.9 FL (ref 7–12)
POTASSIUM REFLEX MAGNESIUM: 4 MMOL/L (ref 3.5–5)
PRO-BNP: 132 PG/ML (ref 0–125)
RBC # BLD: 4.03 E12/L (ref 3.5–5.5)
RBC # BLD: NORMAL 10*6/UL
SODIUM BLD-SCNC: 140 MMOL/L (ref 132–146)
TROPONIN: <0.01 NG/ML (ref 0–0.03)
WBC # BLD: 6.4 E9/L (ref 4.5–11.5)

## 2021-05-20 PROCEDURE — 71046 X-RAY EXAM CHEST 2 VIEWS: CPT

## 2021-05-20 PROCEDURE — 93005 ELECTROCARDIOGRAM TRACING: CPT | Performed by: STUDENT IN AN ORGANIZED HEALTH CARE EDUCATION/TRAINING PROGRAM

## 2021-05-20 PROCEDURE — 85025 COMPLETE CBC W/AUTO DIFF WBC: CPT

## 2021-05-20 PROCEDURE — 85378 FIBRIN DEGRADE SEMIQUANT: CPT

## 2021-05-20 PROCEDURE — 84484 ASSAY OF TROPONIN QUANT: CPT

## 2021-05-20 PROCEDURE — 83880 ASSAY OF NATRIURETIC PEPTIDE: CPT

## 2021-05-20 PROCEDURE — 80048 BASIC METABOLIC PNL TOTAL CA: CPT

## 2021-05-20 PROCEDURE — 99283 EMERGENCY DEPT VISIT LOW MDM: CPT

## 2021-05-20 RX ORDER — FUROSEMIDE 20 MG/1
20 TABLET ORAL DAILY
Qty: 5 TABLET | Refills: 0 | Status: SHIPPED | OUTPATIENT
Start: 2021-05-20 | End: 2021-08-20 | Stop reason: ALTCHOICE

## 2021-05-20 ASSESSMENT — ENCOUNTER SYMPTOMS
BACK PAIN: 0
SHORTNESS OF BREATH: 1
VOMITING: 0
SORE THROAT: 0
ABDOMINAL PAIN: 0
NAUSEA: 0

## 2021-05-20 ASSESSMENT — PAIN DESCRIPTION - DESCRIPTORS: DESCRIPTORS: TIGHTNESS

## 2021-05-20 NOTE — TELEPHONE ENCOUNTER
Pt called states she's going to [facility] ER for symptoms of lower leg edema, chest pain , SOB and dizziness.

## 2021-05-20 NOTE — ED PROVIDER NOTES
This is a 78-year-old female with history of cryptogenic stroke and PFO, status post closure, presenting to the emergency department for leg swelling and shortness of breath. Patient started feeling sensation of palpitations, intermittent lightheadedness, shortness of breath when laying flat, and bilateral leg swelling for the past 5 days, acutely worsening last night. She denies any cough, denies fevers or chills, denies any syncope. She denies any history of DVT or PE, denies any hemoptysis, denies any hormone use or recent surgery or malignancy. She denies any pain in the posterior legs, denies any discoloration of the legs. She denies any chest pain. She is on aspirin, Plavix. She denies any history of irregular heart rhythm, denies any history of atrial fibrillation. 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 She had a negative stress test on August 2020, normal echocardiogram on 1/22/2021. She is followed by cardiology Belkys Esqueda. Palpitations and shortness of breath are intermittent, worse with laying flat, occurring randomly throughout the day for the past 5 days, associated with lightheadedness, mild to moderate in severity. The history is provided by the patient and medical records. Review of Systems   Constitutional: Negative for chills and fever. HENT: Negative for congestion and sore throat. Eyes: Negative for visual disturbance. Respiratory: Positive for shortness of breath. Cardiovascular: Positive for palpitations. Negative for chest pain. Gastrointestinal: Negative for abdominal pain, nausea and vomiting. Genitourinary: Negative for dysuria. Musculoskeletal: Negative for back pain and neck pain. Skin: Negative for rash. Neurological: Positive for light-headedness. Negative for syncope. Physical Exam  Vitals and nursing note reviewed.    Constitutional:       Appearance: She is obese. She is not ill-appearing, toxic-appearing or diaphoretic. Comments: Laying in bed, comfortable. HENT:      Head: Normocephalic and atraumatic. Right Ear: External ear normal.      Left Ear: External ear normal.      Nose: Nose normal.      Mouth/Throat:      Mouth: Mucous membranes are moist.   Eyes:      Extraocular Movements: Extraocular movements intact. Conjunctiva/sclera: Conjunctivae normal.   Cardiovascular:      Rate and Rhythm: Normal rate and regular rhythm. Pulses: Normal pulses. Heart sounds: Normal heart sounds. Pulmonary:      Effort: Pulmonary effort is normal. No respiratory distress. Breath sounds: Normal breath sounds. No wheezing, rhonchi or rales. Chest:      Chest wall: No tenderness. Abdominal:      General: There is no distension. Palpations: Abdomen is soft. Tenderness: There is no abdominal tenderness. There is no guarding or rebound. Comments: Round, soft, nontender   Musculoskeletal:         General: No swelling or deformity. Cervical back: Normal range of motion and neck supple. Right lower leg: Edema present. Left lower leg: Edema present. Comments: 1+ pitting edema pretibial region bilaterally. No pain or swelling or discoloration posterior legs. Skin:     General: Skin is warm and dry. Capillary Refill: Capillary refill takes less than 2 seconds. Neurological:      General: No focal deficit present. Mental Status: She is alert. Motor: No weakness. Psychiatric:         Mood and Affect: Mood normal.         Behavior: Behavior normal.         Thought Content: Thought content normal.          Procedures     MDM  Number of Diagnoses or Management Options  Dependent edema  Palpitations  Diagnosis management comments: This is a 76-year-old female with history of cryptogenic TIA, with closure of patent foramen ovale.   Patient is a school nurse, is presents emergency department for worsening bilateral edema as well as intermittent shortness of breath and palpitations. Patient does follow with cardiology. Patient has no history of DVT or PE, given patient's leg swelling and shortness of breath, cardiac work-up was obtained. On exam patient appears well, with normal sinus rhythm, no evidence of pulmonary edema, normal lung exam.  Patient does have small amount of edema pretibial area bilaterally. No signs or symptoms of DVT. Given patient's history of TIA, shortness of breath, D-dimer was obtained given patient's low risk Wells PE score. This was negative, and no further work-up was pursued. Patient's EKG with no acute ischemic changes, negative troponin, negative BNP, no evidence of pulmonary edema on chest x-ray. Patient with negative orthostatic vital signs, satting 97% on room air no respiratory distress, with no arrhythmias on telemetry monitoring. Discussion was had with patient about unclear etiology of her palpitations and shortness of breath, but discussed with her possible need for follow-up with cardiology for Holter monitor, other work-up if deemed necessary, if she continues to have symptoms. Discussed with patient likely diagnosis of dependent edema of the bilateral lower extremity, especially given her job as a school nurse where she is on her feet frequently. Discussed with patient compression stockings, short course of Lasix. Patient understands reasons to return emergency department, including any new or worsening symptoms, understands importance of outpatient follow-up and all questions were answered. ED Course as of May 20 2359   Thu May 20, 2021   2353 EKG: This EKG is signed and interpreted by me. Rate: 54  Rhythm: Sinus  Interpretation: sinus bradycardia  Comparison: stable as compared to patient's most recent EKG      [RH]      ED Course User Index  [RH] [address], DO          ED Course as of May 20 2359   Thu May 20, 2021   2353 EKG:   This EKG is signed and interpreted by me. Rate: 54  Rhythm: Sinus  Interpretation: sinus bradycardia  Comparison: stable as compared to patient's most recent EKG      [RH]      ED Course User Index  [RH] [address] DO       --------------------------------------------- PAST HISTORY ---------------------------------------------  Past Medical History:  has a past medical history of Arthritis, Back pain, Benign neoplasm of ovary, Blood transfusion, Carotid stenosis, Cerebral artery occlusion with cerebral infarction ([address] Fibromyalgia, History of blood transfusion, Hyperlipidemia, Lupus ([address] Pelvic peritoneal adhesions, female (postoperative) (postinfection), Right leg pain, Seizure ([address] Unspecified symptom associated with female genital organs, and Ureteral obstruction. Past Surgical History:  has a past surgical history that includes joint replacement (Right, pt had 3 knee replacements); Breast surgery (lt breast lumpectomy); Hysterectomy (partial hysterectomy); Foot surgery (rt foot spur);  section (2 c sections); Kidney surgery (ureteral obstruction as child); Abdominal adhesion surgery (aug 2011); Salpingo-oophorectomy (2012); back surgery (); Carpal tunnel release (Right, 2014); Knee arthroscopy (Left, 11/05/15); Carpal tunnel release (Left, 2016); other surgical history (Right, 2017); other surgical history (Left, 2017); other surgical history (2020); and Cardiac surgery. Social History:  reports that she has never smoked. She has never used smokeless tobacco. She reports current alcohol use. She reports that she does not use drugs. Family History: family history includes Alzheimer's Disease in her mother; Breast Cancer in her mother; COPD in her sister; Cancer in her father; Diabetes in her brother and father; Heart Attack in her mother; No Known Problems in her brother, brother, sister, and sister.      The patients home medications have been reviewed. Allergies: Patient has no known allergies.     -------------------------------------------------- RESULTS -------------------------------------------------  Labs:  Results for orders placed or performed during the hospital encounter of 05/20/21   CBC Auto Differential   Result Value Ref Range    WBC 6.4 4.5 - 11.5 E9/L    RBC 4.03 3.50 - 5.50 E12/L    Hemoglobin 12.0 11.5 - 15.5 g/dL    Hematocrit 36.6 34.0 - 48.0 %    MCV 90.8 80.0 - 99.9 fL    MCH 29.8 26.0 - 35.0 pg    MCHC 32.8 32.0 - 34.5 %    RDW 14.0 11.5 - 15.0 fL    Platelets 864 553 - 313 E9/L    MPV 9.9 7.0 - 12.0 fL    Neutrophils % 53.0 43.0 - 80.0 %    Lymphocytes % 34.0 20.0 - 42.0 %    Monocytes % 8.0 2.0 - 12.0 %    Eosinophils % 2.0 0.0 - 6.0 %    Basophils % 0.0 0.0 - 2.0 %    Neutrophils Absolute 3.58 1.80 - 7.30 E9/L    Lymphocytes Absolute 2.18 1.50 - 4.00 E9/L    Monocytes Absolute 0.51 0.10 - 0.95 E9/L    Eosinophils Absolute 0.13 0.05 - 0.50 E9/L    Basophils Absolute 0.00 0.00 - 0.20 E9/L    Metamyelocytes Relative 2.0 (H) 0.0 - 1.0 %    Myelocyte Percent 1.0 0 - 0 %    RBC Morphology Normal    Basic Metabolic Panel w/ Reflex to MG   Result Value Ref Range    Sodium 140 132 - 146 mmol/L    Potassium reflex Magnesium 4.0 3.5 - 5.0 mmol/L    Chloride 104 98 - 107 mmol/L    CO2 26 22 - 29 mmol/L    Anion Gap 10 7 - 16 mmol/L    Glucose 89 74 - 99 mg/dL    BUN 12 6 - 20 mg/dL    CREATININE 0.6 0.5 - 1.0 mg/dL    GFR Non-African American >60 >=60 mL/min/1.73    GFR African American >60     Calcium 9.6 8.6 - 10.2 mg/dL   D-Dimer, Quantitative   Result Value Ref Range    D-Dimer, Quant 201 ng/mL DDU   Troponin   Result Value Ref Range    Troponin <0.01 0.00 - 0.03 ng/mL   Brain Natriuretic Peptide   Result Value Ref Range    Pro- (H) 0 - 125 pg/mL   EKG 12 Lead   Result Value Ref Range    Ventricular Rate 54 BPM    Atrial Rate 54 BPM    P-R Interval 128 ms    QRS Duration 88 ms    Q-T Interval 460 ms    QTc Calculation (Bazett) 436 ms    P Axis 36 degrees    R Axis 72 degrees    T Axis 58 degrees       Radiology:  XR CHEST (2 VW)   Final Result   No pneumonia or pleural effusion.             ------------------------- NURSING NOTES AND VITALS REVIEWED ---------------------------  Date / Time Roomed:  5/20/2021  3:30 PM  ED Bed Assignment:  20/20    The nursing notes within the ED encounter and vital signs as below have been reviewed. /69   Pulse 67   Temp 97.9 °F (36.6 °C)   Resp 20   Ht 5' 3\" (1.6 m)   Wt 214 lb (97.1 kg)   LMP 06/20/2012   SpO2 97%   BMI 37.91 kg/m²   Oxygen Saturation Interpretation: Normal      ------------------------------------------ PROGRESS NOTES ------------------------------------------  11:52 PM EDT  I have spoken with the patient and discussed todays results, in addition to providing specific details for the plan of care and counseling regarding the diagnosis and prognosis. Their questions are answered at this time and they are agreeable with the plan. I discussed at length with them reasons for immediate return here for re evaluation. They will followup with their primary care physician by calling their office tomorrow. --------------------------------- ADDITIONAL PROVIDER NOTES ---------------------------------  At this time the patient is without objective evidence of an acute process requiring hospitalization or inpatient management. They have remained hemodynamically stable throughout their entire ED visit and are stable for discharge with outpatient follow-up. The plan has been discussed in detail and they are aware of the specific conditions for emergent return, as well as the importance of follow-up. Discharge Medication List as of 5/20/2021  7:13 PM      START taking these medications    Details   furosemide (LASIX) 20 MG tablet Take 1 tablet by mouth daily for 5 days, Disp-5 tablet, R-0Normal             Diagnosis:  1. Dependent edema New Problem   2.  Palpitations Disposition:  Patient's disposition: Discharge to home  Patient's condition is stable.          [address] DO  Resident  05/20/21 8532

## 2021-05-21 LAB
EKG ATRIAL RATE: 54 BPM
EKG P AXIS: 36 DEGREES
EKG P-R INTERVAL: 128 MS
EKG Q-T INTERVAL: 460 MS
EKG QRS DURATION: 88 MS
EKG QTC CALCULATION (BAZETT): 436 MS
EKG R AXIS: 72 DEGREES
EKG T AXIS: 58 DEGREES
EKG VENTRICULAR RATE: 54 BPM

## 2021-06-07 ENCOUNTER — HOSPITAL ENCOUNTER (OUTPATIENT)
Age: 56
Discharge: HOME | End: 2021-06-09
Payer: COMMERCIAL

## 2021-06-07 ENCOUNTER — HOSPITAL ENCOUNTER (OUTPATIENT)
Dept: GENERAL RADIOLOGY | Age: 56
Discharge: HOME | End: 2021-06-09
Payer: COMMERCIAL

## 2021-06-07 DIAGNOSIS — M25.561 RIGHT KNEE PAIN, UNSPECIFIED CHRONICITY: ICD-10-CM

## 2021-06-07 PROCEDURE — 73562 X-RAY EXAM OF KNEE 3: CPT

## 2021-06-23 DIAGNOSIS — I49.3 PVC (PREMATURE VENTRICULAR CONTRACTION): ICD-10-CM

## 2021-06-23 RX ORDER — METOPROLOL SUCCINATE 25 MG/1
TABLET, EXTENDED RELEASE ORAL
Qty: 30 TABLET | Refills: 2 | Status: SHIPPED
Start: 2021-06-23 | End: 2021-10-15 | Stop reason: SDUPTHER

## 2021-07-30 ENCOUNTER — HOSPITAL ENCOUNTER (OUTPATIENT)
Dept: CARDIOLOGY | Age: 56
Discharge: HOME | End: 2021-07-30
Payer: COMMERCIAL

## 2021-07-30 DIAGNOSIS — Z87.74 S/P PERCUTANEOUS PATENT FORAMEN OVALE CLOSURE: ICD-10-CM

## 2021-07-30 PROCEDURE — 93308 TTE F-UP OR LMTD: CPT

## 2021-08-18 ENCOUNTER — PROCEDURE VISIT (OUTPATIENT)
Dept: AUDIOLOGY | Age: 56
End: 2021-08-18
Payer: COMMERCIAL

## 2021-08-18 ENCOUNTER — OFFICE VISIT (OUTPATIENT)
Dept: ENT CLINIC | Age: 56
End: 2021-08-18
Payer: COMMERCIAL

## 2021-08-18 VITALS
BODY MASS INDEX: 38.09 KG/M2 | DIASTOLIC BLOOD PRESSURE: 76 MMHG | SYSTOLIC BLOOD PRESSURE: 124 MMHG | WEIGHT: 215 LBS | HEIGHT: 63 IN

## 2021-08-18 DIAGNOSIS — R42 VERTIGO: ICD-10-CM

## 2021-08-18 DIAGNOSIS — H90.3 SENSORINEURAL HEARING LOSS (SNHL) OF BOTH EARS: Primary | ICD-10-CM

## 2021-08-18 DIAGNOSIS — R42 DIZZINESS AND GIDDINESS: ICD-10-CM

## 2021-08-18 DIAGNOSIS — H90.3 SENSORY HEARING LOSS, BILATERAL: Primary | ICD-10-CM

## 2021-08-18 PROCEDURE — 3017F COLORECTAL CA SCREEN DOC REV: CPT | Performed by: OTOLARYNGOLOGY

## 2021-08-18 PROCEDURE — 92557 COMPREHENSIVE HEARING TEST: CPT | Performed by: AUDIOLOGIST

## 2021-08-18 PROCEDURE — 92567 TYMPANOMETRY: CPT | Performed by: AUDIOLOGIST

## 2021-08-18 PROCEDURE — G8427 DOCREV CUR MEDS BY ELIG CLIN: HCPCS | Performed by: OTOLARYNGOLOGY

## 2021-08-18 PROCEDURE — 99204 OFFICE O/P NEW MOD 45 MIN: CPT | Performed by: OTOLARYNGOLOGY

## 2021-08-18 PROCEDURE — 1036F TOBACCO NON-USER: CPT | Performed by: OTOLARYNGOLOGY

## 2021-08-18 PROCEDURE — G8417 CALC BMI ABV UP PARAM F/U: HCPCS | Performed by: OTOLARYNGOLOGY

## 2021-08-18 RX ORDER — BUSPIRONE HYDROCHLORIDE 10 MG/1
TABLET ORAL
COMMUNITY
Start: 2021-07-23

## 2021-08-18 RX ORDER — MECLIZINE HYDROCHLORIDE 25 MG/1
25 TABLET ORAL 3 TIMES DAILY PRN
Status: ON HOLD | COMMUNITY
End: 2022-09-16 | Stop reason: HOSPADM

## 2021-08-18 ASSESSMENT — ENCOUNTER SYMPTOMS
VOMITING: 0
SHORTNESS OF BREATH: 0
COUGH: 0

## 2021-08-18 NOTE — PROGRESS NOTES
This patient was referred for audiometric/tympanometric testing by Dr. Zeke Pate due to vertigo and hearing loss. Audiometry revealed mild sloping to moderately severe through 2000 Hz rising to a mild at 8000 Hz sensorineural hearing loss bilaterally. Reliability was good. Speech reception thresholds were in good agreement with the pure tone averages, bilaterally. Speech discrimination scores were good, bilaterally. Tympanometry revealed normal middle ear peak pressure and compliance, in the right ear. Left ear revealed slightly shallow (0.2ml) compliance. The results were reviewed with the patient and physician. Recommendations for follow up will be made pending physician consult.     Ketan Baron/CCC-A  [place] Lic # [id number]

## 2021-08-18 NOTE — PROGRESS NOTES
[facility] Otolaryngology  Dr. Adarsh Chavez. ANDREIA Shaffer Ms.Ed. New Consult       Patient Name:  Umer Cameron  :  1965     CHIEF C/O:    Chief Complaint   Patient presents with    New Patient     patient here dizziness patient states it is everyday last about 5 minutes at a time    Dizziness       HISTORY OBTAINED FROM:  patient    HISTORY OF PRESENT ILLNESS:       [facility] is a 63y.o. year old female, here today for getting lightheaded and no vertigo no active vertiugo today. Strong cardic history and history of stroke and and PFO closure, MRA shows narrowed arteries in neck current has not been seen by vascular primary care at this point. No other complaints today of new hearing loss, no hearing complaints of room spinning vertigo. No other complaints today of shortness of breath stridor hoarseness no prior history of ear surgery or head trauma.       Past Medical History:   Diagnosis Date    Arthritis arthritis back    and legs,     Back pain arthritis    Benign neoplasm of ovary 2012    Blood transfusion     Carotid stenosis     Cerebral artery occlusion with cerebral infarction (HCC)     Fibromyalgia     History of blood transfusion     Hyperlipidemia     Lupus ([address]     questionable    Pelvic peritoneal adhesions, female (postoperative) (postinfection) 2012    Right leg pain     Seizure ([address]     last one 30 yrs ago- h/o epilepsy as a child    Unspecified symptom associated with female genital organs 2012    Ureteral obstruction as a child had surgery to correct      Past Surgical History:   Procedure Laterality Date    ABDOMINAL ADHESION SURGERY  aug 2011    laproscopy lysis of adhesions left ovarian cystotomy    BACK SURGERY      lumbar    BREAST SURGERY  lt breast lumpectomy    benign    CARDIAC SURGERY      MESH     CARPAL TUNNEL RELEASE Right 2014    CARPAL TUNNEL RELEASE Left 2016     SECTION  2 c sections    FOOT SURGERY  rt foot spur  HYSTERECTOMY  partial hysterectomy    JOINT REPLACEMENT Right pt had 3 knee replacements    KIDNEY SURGERY  ureteral obstruction as child    KNEE ARTHROSCOPY Left 11/05/15    medial menisectomy, chondroplasty, synovectomy, debridement, osteophytes    OTHER SURGICAL HISTORY Right 02/17/2017    hand CPC interpositional arthroplasty    OTHER SURGICAL HISTORY Left 09/08/2017    left carpal metacarpal arthroplasty    OTHER SURGICAL HISTORY  07/29/2020    Dr Carlita Wong - Amplatzer 25mm PFO occluder - Lot# [id number]    SALPINGO-OOPHORECTOMY  July 2012    Attempted laparoscopy, open laparotomy, lysis of adhesions, and BSO       Current Outpatient Medications:     busPIRone (BUSPAR) 10 MG tablet, take 1 tablet by mouth twice a day, Disp: , Rfl:     meclizine (ANTIVERT) 25 MG tablet, Take 25 mg by mouth 3 times daily as needed, Disp: , Rfl:     metoprolol succinate (TOPROL XL) 25 MG extended release tablet, take 1 tablet by mouth once daily, Disp: 30 tablet, Rfl: 2    furosemide (LASIX) 20 MG tablet, Take 1 tablet by mouth daily for 5 days, Disp: 5 tablet, Rfl: 0    atorvastatin (LIPITOR) 40 MG tablet, Take 40 mg by mouth daily, Disp: , Rfl:     aspirin (PX ENTERIC ASPIRIN) 81 MG EC tablet, Take 1 tablet by mouth daily, Disp: 30 tablet, Rfl: 3    Multiple Vitamin (MULTI-VITAMIN DAILY PO), Take by mouth daily, Disp: , Rfl:   Patient has no known allergies. Social History     Tobacco Use    Smoking status: Never Smoker    Smokeless tobacco: Never Used   Vaping Use    Vaping Use: Never used   Substance Use Topics    Alcohol use: Yes     Comment: occasionally.   2 coffee per day    Drug use: Never     Family History   Problem Relation Age of Onset    Heart Attack Mother     Alzheimer's Disease Mother     Breast Cancer Mother     Cancer Father         skin rare form     Diabetes Father     No Known Problems Sister     No Known Problems Brother     No Known Problems Sister     COPD Sister     Diabetes thyromegaly. Trachea: No tracheal deviation. Cardiovascular:      Rate and Rhythm: Normal rate. Pulmonary:      Effort: Pulmonary effort is normal. No respiratory distress. Musculoskeletal:         General: Normal range of motion. Cervical back: Normal range of motion. Lymphadenopathy:      Cervical: No cervical adenopathy. Skin:     General: Skin is warm. Findings: No erythema. Neurological:      Mental Status: She is alert. Cranial Nerves: No cranial nerve deficit. IMPRESSION/PLAN:  Patient has a negative work-up from otolaryngology standpoint including negative Kress-Hallpike for inner ear vestibular pathology, recommendations with the patient significant cardiac history be seen by the cardiologist for her doing on going lightheadedness that is not described as room spinning vertigo. Audiogram does show bilateral moderate sensorineural hearing loss, therefore a repeat audio in 1 year to continue observe is recommended. Dr. Cathy Shaffer D.O., Ms. [facility] Otolaryngology/Facial Plastic Surgery Residency  Associate Clinical Professor:  Rhoda Delcid, [facility]

## 2021-08-20 ENCOUNTER — NURSE ONLY (OUTPATIENT)
Dept: CARDIOLOGY CLINIC | Age: 56
End: 2021-08-20

## 2021-08-20 ENCOUNTER — OFFICE VISIT (OUTPATIENT)
Dept: CARDIOLOGY CLINIC | Age: 56
End: 2021-08-20
Payer: COMMERCIAL

## 2021-08-20 VITALS — DIASTOLIC BLOOD PRESSURE: 58 MMHG | SYSTOLIC BLOOD PRESSURE: 110 MMHG | HEART RATE: 71 BPM

## 2021-08-20 VITALS
HEIGHT: 63 IN | WEIGHT: 237.6 LBS | BODY MASS INDEX: 42.1 KG/M2 | OXYGEN SATURATION: 95 % | DIASTOLIC BLOOD PRESSURE: 66 MMHG | RESPIRATION RATE: 16 BRPM | HEART RATE: 59 BPM | SYSTOLIC BLOOD PRESSURE: 118 MMHG

## 2021-08-20 DIAGNOSIS — G45.9 TIA (TRANSIENT ISCHEMIC ATTACK): ICD-10-CM

## 2021-08-20 DIAGNOSIS — R42 EPISODIC LIGHTHEADEDNESS: Primary | ICD-10-CM

## 2021-08-20 DIAGNOSIS — E78.5 HYPERLIPIDEMIA, UNSPECIFIED HYPERLIPIDEMIA TYPE: ICD-10-CM

## 2021-08-20 DIAGNOSIS — Z87.74 S/P PERCUTANEOUS PATENT FORAMEN OVALE CLOSURE: ICD-10-CM

## 2021-08-20 PROCEDURE — 93000 ELECTROCARDIOGRAM COMPLETE: CPT | Performed by: INTERNAL MEDICINE

## 2021-08-20 PROCEDURE — 1036F TOBACCO NON-USER: CPT | Performed by: INTERNAL MEDICINE

## 2021-08-20 PROCEDURE — 99213 OFFICE O/P EST LOW 20 MIN: CPT | Performed by: INTERNAL MEDICINE

## 2021-08-20 PROCEDURE — G8417 CALC BMI ABV UP PARAM F/U: HCPCS | Performed by: INTERNAL MEDICINE

## 2021-08-20 PROCEDURE — G8427 DOCREV CUR MEDS BY ELIG CLIN: HCPCS | Performed by: INTERNAL MEDICINE

## 2021-08-20 PROCEDURE — 3017F COLORECTAL CA SCREEN DOC REV: CPT | Performed by: INTERNAL MEDICINE

## 2021-08-20 RX ORDER — FUROSEMIDE 40 MG/1
40 TABLET ORAL DAILY PRN
Qty: 5 TABLET | Refills: 0 | Status: SHIPPED
Start: 2021-08-20 | End: 2021-08-23

## 2021-08-20 NOTE — PATIENT INSTRUCTIONS
1. Orthostatic vital signs  2. Lasix 40 mg as needed for leg swelling. If you have to take it more than 2 times a week please let me know  3. Compression stockings  4. Heart monitor for three days  5.  Return in 3 months

## 2021-08-20 NOTE — PROGRESS NOTES
3 day Zio XT monitor applied. Pt verbalized understanding. Orthostatic BP's taken;  Laying 126/70, HR 69  Sitting   118/68, HR 66  Standing 110/58, HR 71. Dr Lavinia Kerns notified.

## 2021-08-20 NOTE — PROGRESS NOTES
[address] [facility]   Heart and V[facility] [place]   Clinic Note     Date:8/20/21   Patient Name:Quynh Gold  YOB: 1965  Age: 63 y.o. Primary Care Provider: LONNY Clifford    Subjective     This is a pleasant 48-year-old  female who returns for follow-up. She has been doing well. Over the past few months she has noticed intermittent lower extremity edema but no orthopnea or PND. She has no dyspnea or chest discomfort and her palpitations are very rare. She has not had any syncope. Her main complaint is lightheadedness which is very frequent, almost daily and is not particularly related to position. It has no obvious trigger      A focused history review includes:  1. 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 RoPE score 6  2. Status post percutaneous PFO closure with 25 mm PFO occluder on July 29, 2020.  1. Postop day 1, postop day 30 TTE without concerns  2. 30-day event monitor post closure for palpitations without significant arrhythmia  3. 6-month TTE personally reviewed today: Well-seated PFO occluder without shunt by bubbles or color and no other gross abnormality. 4. 12-month TTE: No gross abnormality. No abnormal flow by color  3. Exercise stress test in August 2020 was negative for EKG changes at 83% maximum predicted heart rate. Impaired functional capacity at 5 minutes. 4. Simple migraine  5. Remote history of seizures in childhood  6. Morbid obesity  7. Carotid US with minimal plaque 6/2021  8. Partially empty sella  9.      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    Blood transfusion     Carotid stenosis     Cerebral artery occlusion with cerebral infarction (HCC)     Fibromyalgia     History of blood transfusion     Hyperlipidemia     Lupus (HCC)     questionable    Pelvic peritoneal adhesions, female (postoperative) (postinfection) 7/16/2012    Right leg pain     Seizure ([address]     last one 30 yrs ago- h/o epilepsy as a child    Unspecified symptom associated with female genital organs 7/16/2012    Ureteral obstruction as a child had surgery to correct           Social History:    Social History     Tobacco History     Smoking Status  Never Smoker    Smokeless Tobacco Use  Never Used          Alcohol History     Alcohol Use Status  Yes Comment  occasionally. 2 coffee per day          Drug Use     Drug Use Status  Never          Sexual Activity     Sexually Active  Not Asked                    Family History:       Problem Relation Age of Onset    Heart Attack Mother     Alzheimer's Disease Mother     Breast Cancer Mother     Cancer Father         skin rare form     Diabetes Father     No Known Problems Sister     No Known Problems Brother     No Known Problems Sister     COPD Sister     Diabetes Brother     No Known Problems Brother          Review of Systems   As above        Medications     Current Outpatient Medications   Medication Sig Dispense Refill    furosemide (LASIX) 40 MG tablet Take 1 tablet by mouth daily as needed (for swelling) 5 tablet 0    busPIRone (BUSPAR) 10 MG tablet take 1 tablet by mouth twice a day      meclizine (ANTIVERT) 25 MG tablet Take 25 mg by mouth 3 times daily as needed      metoprolol succinate (TOPROL XL) 25 MG extended release tablet take 1 tablet by mouth once daily 30 tablet 2    atorvastatin (LIPITOR) 40 MG tablet Take 40 mg by mouth daily      aspirin (PX ENTERIC ASPIRIN) 81 MG EC tablet Take 1 tablet by mouth daily 30 tablet 3    Multiple Vitamin (MULTI-VITAMIN DAILY PO) Take by mouth daily       No current facility-administered medications for this visit.            Physical Examination      /66 (Site: Left Upper Arm, Position: Sitting, Cuff Size: Large Adult)   Pulse 59   Resp 16   Ht 5' 3\" (1.6 m)   Wt 237 lb 9.6 oz (107.8 kg) LMP 06/20/2012   SpO2 95%   BMI 42.09 kg/m²     General: No acute distress, appears as stated age, nonicteric  Head: Atraumatic, no gross abnormalities or bruises  Neck: Supple and nontender, no carotid bruits, mildly elevated JVP  Lungs: Clear to auscultation bilaterally, no wheezes, rales, or rhonchi  Heart: Regular rate and rhythm, no murmurs, rubs, or gallops  Abdomen: Soft, nontender, nondistended, normal bowel sounds  Extremities: No obvious deformities, no cyanosis, trace pitting edema  Neurological: Alert and oriented x3, EOMI, moving all extremities x4  Psychological: Normal mood and affect, cooperative  Skin: Color, texture, and turgor normal for age          Labs/Imaging/Diagnostics     Lab Results   Component Value Date     05/20/2021    K 4.0 05/20/2021     05/20/2021    CO2 26 05/20/2021    BUN 12 05/20/2021    CREATININE 0.6 05/20/2021    GLUCOSE 89 05/20/2021    GLUCOSE 90 08/11/2011    CALCIUM 9.6 05/20/2021        Estimated Creatinine Clearance: 123 mL/min (based on SCr of 0.6 mg/dL).     Lab Results   Component Value Date    WBC 6.4 05/20/2021    HGB 12.0 05/20/2021    HCT 36.6 05/20/2021    MCV 90.8 05/20/2021     05/20/2021       Lab Results   Component Value Date    ALT 12 11/09/2019    AST 16 11/09/2019    ALKPHOS 59 11/09/2019    BILITOT 0.3 11/09/2019       Lab Results   Component Value Date    LABALBU 3.8 11/09/2019       Lab Results   Component Value Date    CHOL 151 07/22/2020    CHOL 200 (H) 11/08/2019     Lab Results   Component Value Date    TRIG 74 07/22/2020    TRIG 89 11/08/2019     Lab Results   Component Value Date    HDL 77 07/22/2020    HDL 77 11/08/2019     Lab Results   Component Value Date    LDLCALC 59 07/22/2020    LDLCALC 105 (H) 11/08/2019     Lab Results   Component Value Date    LABVLDL 15 07/22/2020    LABVLDL 18 11/08/2019         Lab Results   Component Value Date    LABA1C 5.3 11/08/2019     No results found for: EAG     Pertinent Cardiovascular Studies:  EKG: Today personally interpreted  Sinus bradycardia. Otherwise normal        Assessment and Plan:        Very pleasant 79-year-old  female with a history noted above. I suspect she will have HFpEF going forward. She is ever so slightly hypervolemic today and has no angina or worsening palpitations. Given the presence of the PFO occluder I would like to rule out any arrhythmia with an event monitor. From a structural standpoint her TTE less than a month ago was unremarkable. Diagnosis Orders   1. Episodic lightheadedness  Cardiac event monitor   2. Hyperlipidemia, unspecified hyperlipidemia type  EKG 12 Lead   3. S/P percutaneous patent foramen ovale closure     4. TIA (transient ischemic attack)        · Continue Toprol-XL 25 mg p.o. daily  · continue aspirin 81 mg p.o. daily for at least 3 years post PFO closure  · Continue atorvastatin as is. Her LDL cholesterol is excellent. · Lasix 40 mg daily as needed for edema. Compression stockings. ·      Follow up with me in 3 months. I suspect if she has persistent edema that we will make the diuretic daily    We appreciate the opportunity to participate in Her care!       Jordi Coyne MD, [address] [facility]  Interventional Cardiology/Structural Heart Disease  Office: [phone]  Coordinator: Nancy Carver

## 2021-08-23 RX ORDER — FUROSEMIDE 40 MG/1
TABLET ORAL
Qty: 5 TABLET | Refills: 0 | Status: SHIPPED
Start: 2021-08-23 | End: 2022-08-19 | Stop reason: ALTCHOICE

## 2021-09-14 DIAGNOSIS — R42 EPISODIC LIGHTHEADEDNESS: ICD-10-CM

## 2021-10-15 DIAGNOSIS — I49.3 PVC (PREMATURE VENTRICULAR CONTRACTION): ICD-10-CM

## 2021-10-15 RX ORDER — METOPROLOL SUCCINATE 25 MG/1
25 TABLET, EXTENDED RELEASE ORAL DAILY
Qty: 90 TABLET | Refills: 3 | Status: SHIPPED
Start: 2021-10-15 | End: 2022-08-19 | Stop reason: SDUPTHER

## 2022-01-25 ENCOUNTER — TELEPHONE (OUTPATIENT)
Dept: CARDIOLOGY CLINIC | Age: 57
End: 2022-01-25

## 2022-01-25 NOTE — TELEPHONE ENCOUNTER
Left pt a voicemail message stating Dr. Gt Hubbard will not be in the office on 1/28/21. She needs to be rescheduled.

## 2022-04-24 ENCOUNTER — APPOINTMENT (OUTPATIENT)
Dept: GENERAL RADIOLOGY | Age: 57
End: 2022-04-24
Payer: COMMERCIAL

## 2022-04-24 ENCOUNTER — HOSPITAL ENCOUNTER (EMERGENCY)
Age: 57
Discharge: HOME | End: 2022-04-24
Payer: COMMERCIAL

## 2022-04-24 VITALS
RESPIRATION RATE: 18 BRPM | OXYGEN SATURATION: 97 % | HEART RATE: 92 BPM | DIASTOLIC BLOOD PRESSURE: 92 MMHG | SYSTOLIC BLOOD PRESSURE: 148 MMHG | TEMPERATURE: 96.3 F

## 2022-04-24 DIAGNOSIS — S80.02XA CONTUSION OF LEFT KNEE, INITIAL ENCOUNTER: ICD-10-CM
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
## 2022-04-24 DIAGNOSIS — R07.89 CHEST WALL PAIN: ICD-10-CM

## 2022-04-24 PROCEDURE — 99283 EMERGENCY DEPT VISIT LOW MDM: CPT

## 2022-04-24 PROCEDURE — 73562 X-RAY EXAM OF KNEE 3: CPT

## 2022-04-24 PROCEDURE — 6370000000 HC RX 637 (ALT 250 FOR IP): Performed by: PHYSICIAN ASSISTANT

## 2022-04-24 PROCEDURE — 71046 X-RAY EXAM CHEST 2 VIEWS: CPT

## 2022-04-24 RX ORDER — NAPROXEN 500 MG/1
500 TABLET ORAL 2 TIMES DAILY WITH MEALS
Qty: 10 TABLET | Refills: 0 | Status: SHIPPED | OUTPATIENT
Start: 2022-04-24 | End: 2022-08-19 | Stop reason: ALTCHOICE

## 2022-04-24 RX ORDER — NAPROXEN 375 MG/1
375 TABLET ORAL ONCE
Status: COMPLETED | OUTPATIENT
Start: 2022-04-24 | End: 2022-04-24

## 2022-04-24 RX ORDER — CYCLOBENZAPRINE HCL 5 MG
10 TABLET ORAL 2 TIMES DAILY PRN
Qty: 6 TABLET | Refills: 0 | Status: SHIPPED | OUTPATIENT
Start: 2022-04-24 | End: 2022-04-27

## 2022-04-24 RX ORDER — CYCLOBENZAPRINE HCL 10 MG
10 TABLET ORAL ONCE
Status: COMPLETED | OUTPATIENT
Start: 2022-04-24 | End: 2022-04-24

## 2022-04-24 RX ADMIN — CYCLOBENZAPRINE HYDROCHLORIDE 10 MG: 10 TABLET, FILM COATED ORAL at 22:30

## 2022-04-24 RX ADMIN — NAPROXEN 375 MG: 375 TABLET ORAL at 22:30

## 2022-04-25 NOTE — ED PROVIDER NOTES
[facility]  Department of Emergency Medicine   ED  Encounter Note  Admit Date/RoomTime: 2022 10:03 PM  ED Room: [place] [address]    NAME: Umer Cameron  : 1965  MRN: [id number]     Chief Complaint:  Fall (From standing position ), Arm Pain (Bilateral), Chest Pain (From landing on rock during fall), and Leg Pain (Left)    History of Present Illness       Umer Cameron is a 61 y.o. old female who presents to the emergency department by private vehicle, for a mechanical fall which occured a few hour(s) prior to arrival. She reportedly fell while walking while prior to incident with complaints of anterior chest wall pain, and left knee pain. Patient reports that when she fell she did strike the front of her chest off of the ground as well as her left knee. She reports she did not have any head injury or lose consciousness. Patient reports that it was a witnessed fall as her family was there and they were able to help her off of the ground. She is able to ambulate afterwards but reports that she has having increased left-sided knee pain and anterior chest wall pain. Patient reports that pain is worsened when touching the left knee or left chest.  Patient denies additional symptoms of breath, difficulty breathing, emesis, diarrhea, fever, chills, dizziness, headache. The patients tetanus status is unknown. ROS   Pertinent positives and negatives are stated within HPI, all other systems reviewed and are negative.     Past Medical History:  has a past medical history of Arthritis, Back pain, Benign neoplasm of ovary, Blood transfusion, Carotid stenosis, Cerebral artery occlusion with cerebral infarction ([address] Fibromyalgia, History of blood transfusion, Hyperlipidemia, Lupus ([address] Pelvic peritoneal adhesions, female (postoperative) (postinfection), Right leg pain, Seizure ([address] Unspecified symptom associated with female genital organs, and Ureteral obstruction. Surgical History:  has a past surgical history that includes joint replacement (Right, pt had 3 knee replacements); Breast surgery (lt breast lumpectomy); Hysterectomy (partial hysterectomy); Foot surgery (rt foot spur);  section (2 c sections); Kidney surgery (ureteral obstruction as child); Abdominal adhesion surgery (aug 2011); Salpingo-oophorectomy (2012); back surgery (); Carpal tunnel release (Right, 2014); Knee arthroscopy (Left, 11/05/15); Carpal tunnel release (Left, 2016); other surgical history (Right, 2017); other surgical history (Left, 2017); other surgical history (2020); and Cardiac surgery. Social History:  reports that she has never smoked. She has never used smokeless tobacco. She reports current alcohol use. She reports that she does not use drugs. Family History: family history includes Alzheimer's Disease in her mother; Breast Cancer in her mother; COPD in her sister; Cancer in her father; Diabetes in her brother and father; Heart Attack in her mother; No Known Problems in her brother, brother, sister, and sister. Allergies: Patient has no known allergies. Physical Exam   Oxygen Saturation Interpretation: Normal.        ED Triage Vitals [22]   BP Temp Temp Source Pulse Resp SpO2 Height Weight   (!) 155/93 96.3 °F (35.7 °C) Infrared 92 20 93 % -- --         Physical Exam  Constitutional:  Alert, development consistent with age. HEENT:  NC/NT. Airway patent. Neck:  No midline or paravertebral tenderness. Normal ROM. Supple. Chest:  Symmetrical without visible rash or tenderness. Respiratory:  Lungs Clear to auscultation and breath sounds equal.  CV:  Regular rate and rhythm, normal heart sounds, without pathological murmurs, ectopy, gallops, or rubs. GI:  Abdomen Soft, nontender, good bowel sounds. No firm or pulsatile mass. Pelvis:  Stable, nontender to palpation.   Back:  No midline or paravertebral tenderness. No costovertebral tenderness. Extremities: Tenderness to palpation over left knee globally. Mild edema without obvious bony deformity appreciated. Full range of motion of left lower extremity with pain verbalized at left knee. Tenderness to palpation over anterior chest wall over midline diffusely without any obvious bony deformity or crepitus. Integument:  Normal turgor. Warm, dry, without visible rash, unless noted elsewhere. Lymphatic: no lymphadenopathy noted  Neurological:  Oriented x3, GCS 15. Motor functions intact. Lab / Imaging Results   (All laboratory and radiology results have been personally reviewed by myself)  Labs:  No results found for this visit on 04/24/22. Imaging: All Radiology results interpreted by Radiologist unless otherwise noted. XR KNEE LEFT (3 VIEWS)   Final Result   1. Decreased osseous mineralization. No acute osseous findings seen about   the left knee. 2.  Moderate to severe left knee osteoarthritis. RECOMMENDATION:   In the setting of trauma, if there is persistent symptoms and physical exam   warrants a repeat radiograph in 10-14 days could be considered as occult   fractures may not be evident on initial imaging evaluation. XR CHEST (2 VW)   Final Result   No acute process. ED Course / Medical Decision Making     Medications   cyclobenzaprine (FLEXERIL) tablet 10 mg (has no administration in time range)   naproxen (NAPROSYN) tablet 375 mg (has no administration in time range)          Consult(s):   None    Procedure(s):  None    MDM:   Patient presented to ER today secondary to mechanical fall sustained several hours prior to arrival with complaints of chest wall pain and left knee pain. X-ray imaging of the chest demonstrated no acute process as read by radiology. Imaging of the left knee noted moderate to severe left knee osteoarthritis without acute osseous findings as read by radiology.   Results were discussed with patient prior to disposition all questions were answered. At this time patient has sustained musculoskeletal injuries secondary to mechanical fall which were discussed with her in depth prior to departure. Patient did report that she felt like the pain was coming in waves and based on the fact that she fell directly on her chest I feel that she is inflamed her costal muscles are bedtime she takes of breath she is having spasms. Patient would benefit from short duration of muscle relaxers and anti-inflammatories. Patient was given naproxen and Flexeril in ED setting today which she tolerated well. Patient prescription sent to pharmacy of choice for short duration of anti-inflammatory and muscle relaxer to be taken as directed as needed for pain and swelling control until follow-up with primary care provider. Patient appropriate for outpatient treatment at this time as she is alert and oriented, no acute distress and afebrile. Patient recommended to report back to ER with new or worsening symptoms. Patient states she is both understandable and agreeable to this plan. Plan of Care/Counseling:  LONNY Garrett reviewed today's visit with the patient in addition to providing specific details for the plan of care and counseling regarding the diagnosis and prognosis. Questions are answered at this time and are agreeable with the plan. Assessment      1. Fall, initial encounter    2. Chest wall pain    3. Contusion of left knee, initial encounter      Plan   Discharged home. Patient condition is good    New Medications     New Prescriptions    No medications on file     Electronically signed by LONNY Garrett   DD: 4/24/22  **This report was transcribed using voice recognition software. Every effort was made to ensure accuracy; however, inadvertent computerized transcription errors may be present.   END OF ED PROVIDER NOTE       Carter Garrett  04/25/22 [id number]

## 2022-04-25 NOTE — ED NOTES
Department of Emergency Medicine  FIRST PROVIDER TRIAGE NOTE             Independent MLP           4/24/22  9:10 PM EDT    Date of Encounter: 4/24/22   MRN: [id number]      HPI: Alin Murry is a 61 y.o. female who presents to the ED for Fall (From standing position ), Arm Pain (Bilateral), Chest Pain (From landing on rock during fall), and Leg Pain (Left)  Patient reports that she was walking outside when she had a mechanical fall due to tripping and landed on her left knee and anterior chest.  Patient reports that she had immediate pain that she is still having chest discomfort with palpation and deep inspiration. Patient states that she did not have any loss of consciousness or sustain additional injuries    ROS: Negative for abd pain, back pain, vomiting, diarrhea, urinary complaints, headache or dizziness. PE: Gen Appearance/Constitutional: alert  CV: regular rate  Pulm: CTA bilat  GI: soft and NT     Initial Plan of Care: All treatment areas with department are currently occupied. Plan to order/Initiate the following while awaiting opening in ED: imaging studies.   Initiate Treatment-Testing, Proceed toTreatment Area When Bed Available for ED Attending/MLP to Continue Care    Electronically signed by Sheilda Prader, PA   DD: 4/24/22         Sheilda Prader, [place]  04/24/22 [id number]

## 2022-04-27 NOTE — PROGRESS NOTES
Yes, that should be okay. Let us know if not better in a week and requiring NSAIDs.  Take on full stomach

## 2022-06-13 ENCOUNTER — HOSPITAL ENCOUNTER (OUTPATIENT)
Dept: MAMMOGRAPHY | Age: 57
Discharge: HOME | End: 2022-06-15
Payer: COMMERCIAL

## 2022-06-13 VITALS — WEIGHT: 237 LBS | HEIGHT: 63 IN | BODY MASS INDEX: 41.99 KG/M2

## 2022-06-13 DIAGNOSIS — Z12.31 ENCOUNTER FOR SCREENING MAMMOGRAM FOR MALIGNANT NEOPLASM OF BREAST: ICD-10-CM

## 2022-06-13 PROCEDURE — 77067 SCR MAMMO BI INCL CAD: CPT

## 2022-08-19 ENCOUNTER — OFFICE VISIT (OUTPATIENT)
Dept: CARDIOLOGY CLINIC | Age: 57
End: 2022-08-19
Payer: COMMERCIAL

## 2022-08-19 VITALS
DIASTOLIC BLOOD PRESSURE: 70 MMHG | OXYGEN SATURATION: 94 % | RESPIRATION RATE: 16 BRPM | HEART RATE: 57 BPM | WEIGHT: 229.6 LBS | BODY MASS INDEX: 40.68 KG/M2 | SYSTOLIC BLOOD PRESSURE: 124 MMHG | HEIGHT: 63 IN

## 2022-08-19 DIAGNOSIS — R07.89 ATYPICAL CHEST PAIN: ICD-10-CM

## 2022-08-19 DIAGNOSIS — Z87.74 S/P PERCUTANEOUS PATENT FORAMEN OVALE CLOSURE: ICD-10-CM

## 2022-08-19 DIAGNOSIS — R06.09 DOE (DYSPNEA ON EXERTION): Primary | ICD-10-CM

## 2022-08-19 PROCEDURE — 1036F TOBACCO NON-USER: CPT | Performed by: INTERNAL MEDICINE

## 2022-08-19 PROCEDURE — 99214 OFFICE O/P EST MOD 30 MIN: CPT | Performed by: INTERNAL MEDICINE

## 2022-08-19 PROCEDURE — G8417 CALC BMI ABV UP PARAM F/U: HCPCS | Performed by: INTERNAL MEDICINE

## 2022-08-19 PROCEDURE — G8427 DOCREV CUR MEDS BY ELIG CLIN: HCPCS | Performed by: INTERNAL MEDICINE

## 2022-08-19 PROCEDURE — 93000 ELECTROCARDIOGRAM COMPLETE: CPT | Performed by: INTERNAL MEDICINE

## 2022-08-19 PROCEDURE — 3017F COLORECTAL CA SCREEN DOC REV: CPT | Performed by: INTERNAL MEDICINE

## 2022-08-19 RX ORDER — BLACK COHOSH ROOT 200 MG
CAPSULE ORAL
COMMUNITY
Start: 2022-07-01

## 2022-08-19 RX ORDER — ATORVASTATIN CALCIUM 40 MG/1
40 TABLET, FILM COATED ORAL DAILY
Qty: 90 TABLET | Refills: 3 | Status: SHIPPED | OUTPATIENT
Start: 2022-08-19

## 2022-08-19 RX ORDER — FERROUS SULFATE 325(65) MG
TABLET ORAL
COMMUNITY
Start: 2022-07-01

## 2022-08-19 RX ORDER — OMEPRAZOLE 10 MG/1
CAPSULE, DELAYED RELEASE ORAL
COMMUNITY
Start: 2022-07-23

## 2022-08-19 RX ORDER — METOPROLOL SUCCINATE 25 MG/1
25 TABLET, EXTENDED RELEASE ORAL DAILY
Qty: 90 TABLET | Refills: 3 | Status: SHIPPED | OUTPATIENT
Start: 2022-08-19

## 2022-08-19 NOTE — PROGRESS NOTES
Pelvic peritoneal adhesions, female (postoperative) (postinfection) 7/16/2012    Right leg pain     Seizure ([address]     last one 30 yrs ago- h/o epilepsy as a child    Unspecified symptom associated with female genital organs 7/16/2012    Ureteral obstruction as a child had surgery to correct           Social History:    Social History       Tobacco History       Smoking Status  Never Smoker      Smokeless Tobacco Use  Never Used              Alcohol History       Alcohol Use Status  Yes Comment  occasionally.   2 coffee per day              Drug Use       Drug Use Status  Never              Sexual Activity       Sexually Active  Not Asked                        Family History:       Problem Relation Age of Onset    Heart Attack Mother     Alzheimer's Disease Mother     Breast Cancer Mother     Cancer Father         skin rare form     Diabetes Father     No Known Problems Sister     No Known Problems Brother     No Known Problems Sister     COPD Sister     Diabetes Brother     No Known Problems Brother     Colon Cancer Maternal Grandmother     Breast Cancer Maternal Aunt     Colon Cancer Maternal Aunt     Ovarian Cancer Maternal Cousin          Review of Systems   As above        Medications     Current Outpatient Medications   Medication Sig Dispense Refill    RA VITAMIN C 250 MG tablet take 1 tablet by mouth once daily      FEROSUL 325 (65 Fe) MG tablet take 1 tablet by mouth once daily with VITAMIN C      omeprazole (PRILOSEC) 10 MG delayed release capsule take 1 capsule by mouth once daily BEFORE A MEAL      metoprolol succinate (TOPROL XL) 25 MG extended release tablet Take 1 tablet by mouth daily 90 tablet 3    atorvastatin (LIPITOR) 40 MG tablet Take 1 tablet by mouth daily 90 tablet 3    busPIRone (BUSPAR) 10 MG tablet take 1 tablet by mouth twice a day      meclizine (ANTIVERT) 25 MG tablet Take 25 mg by mouth 3 times daily as needed      aspirin (PX ENTERIC ASPIRIN) 81 MG EC tablet Take 1 tablet by mouth Component Value Date    TRIG 74 07/22/2020    TRIG 89 11/08/2019     Lab Results   Component Value Date    HDL 77 07/22/2020    HDL 77 11/08/2019     Lab Results   Component Value Date    LDLCALC 59 07/22/2020    LDLCALC 105 (H) 11/08/2019     Lab Results   Component Value Date    LABVLDL 15 07/22/2020    LABVLDL 18 11/08/2019         Lab Results   Component Value Date    LABA1C 5.3 11/08/2019     No results found for: EAG     Pertinent Cardiovascular Studies:  EKG: Today personally interpreted  Sinus bradycardia. Otherwise normal        Assessment and Plan:        Very pleasant 58-year-old  female with a history noted above. She is doing very well. She has no significant palpitations, is euvolemic. Her chest discomfort is certainly atypical but she does have risk factors for CAD including being overweight and carotid atherosclerosis. She does have dyspnea on exertion with 1 flight of stairs which is about as much exertion as she does. Her prior stress test was nondiagnostic. Diagnosis Orders   1. AMARAL (dyspnea on exertion)  NM Cardiac Stress Test Nuclear Imaging    Cardiac Stress Test Exercise- Treadmill      2. S/P percutaneous patent foramen ovale closure  ECHO LIMITED      3. Atypical chest pain           Continue aspirin 81 mg daily, atorvastatin 40 mg daily, metoprolol succinate 25 mg daily  Obtain labs from PCP. She tells me that she was noted to be anemic and is being seen by GI in the near future  Surveillance echocardiogram, limited post PFO closure  Exercise stress MPI with conversion to pharmacological stress if needed. Hold beta-blocker prior to stress test    Follow up with me in 12 months    We appreciate the opportunity to participate in Her care!       Cordell Buchanan MD, [facility]  Interventional Cardiology/Structural Heart Disease  Office: [phone]  Coordinator: Yeni Daley

## 2022-08-19 NOTE — PATIENT INSTRUCTIONS
Stress test: do NOT take your metoprolol the night before and no caffeine for at least 12 hrs  Echocardiogram   No changes to medications  Return in 12 months

## 2022-08-19 NOTE — LETTER
[facility]  [address] [place] [address]  Phone: [phone]  Fax: [phone]    Sravan Villanueva MD    August 19, 2022     Dionne Cervantes, [address] [facility] [address]    Patient: Callum Gaston   MR Number: [id number]   YOB: 1965   Date of Visit: 8/19/2022       Dear Dionne Cervantes:    Thank you for referring Yudith Wright to me for evaluation/treatment. Below are the relevant portions of my assessment and plan of care. If you have questions, please do not hesitate to call me. I look forward to following Mimi Lebron along with you.     Sincerely,      Sravan Villanueva MD

## 2022-08-31 ENCOUNTER — HOSPITAL ENCOUNTER (EMERGENCY)
Age: 57
Discharge: HOME | End: 2022-08-31
Payer: COMMERCIAL

## 2022-08-31 ENCOUNTER — TELEPHONE (OUTPATIENT)
Dept: CARDIOLOGY | Age: 57
End: 2022-08-31

## 2022-08-31 VITALS
BODY MASS INDEX: 40.4 KG/M2 | WEIGHT: 228 LBS | OXYGEN SATURATION: 99 % | HEART RATE: 89 BPM | RESPIRATION RATE: 18 BRPM | HEIGHT: 63 IN | SYSTOLIC BLOOD PRESSURE: 174 MMHG | DIASTOLIC BLOOD PRESSURE: 70 MMHG | TEMPERATURE: 98.3 F

## 2022-08-31 DIAGNOSIS — N30.01 ACUTE CYSTITIS WITH HEMATURIA: Primary | ICD-10-CM

## 2022-08-31 LAB
BACTERIA: ABNORMAL /HPF
BILIRUBIN URINE: NEGATIVE
BLOOD, URINE: ABNORMAL
CLARITY: ABNORMAL
COLOR: ABNORMAL
EPITHELIAL CELLS, UA: ABNORMAL /HPF
GLUCOSE URINE: 100 MG/DL
KETONES, URINE: ABNORMAL MG/DL
LEUKOCYTE ESTERASE, URINE: ABNORMAL
NITRITE, URINE: POSITIVE
PH UA: 5 (ref 5–9)
PROTEIN UA: >=300 MG/DL
RBC UA: >20 /HPF (ref 0–2)
SPECIFIC GRAVITY UA: 1.02 (ref 1–1.03)
UROBILINOGEN, URINE: 2 E.U./DL
WBC UA: >20 /HPF (ref 0–5)

## 2022-08-31 PROCEDURE — 87077 CULTURE AEROBIC IDENTIFY: CPT

## 2022-08-31 PROCEDURE — 99211 OFF/OP EST MAY X REQ PHY/QHP: CPT

## 2022-08-31 PROCEDURE — 81001 URINALYSIS AUTO W/SCOPE: CPT

## 2022-08-31 PROCEDURE — 87088 URINE BACTERIA CULTURE: CPT

## 2022-08-31 PROCEDURE — 87186 SC STD MICRODIL/AGAR DIL: CPT

## 2022-08-31 RX ORDER — ONDANSETRON 4 MG/1
4 TABLET, ORALLY DISINTEGRATING ORAL EVERY 8 HOURS PRN
Qty: 12 TABLET | Refills: 0 | Status: ON HOLD | OUTPATIENT
Start: 2022-08-31 | End: 2022-09-16 | Stop reason: HOSPADM

## 2022-08-31 RX ORDER — CEFDINIR 300 MG/1
300 CAPSULE ORAL 2 TIMES DAILY
Qty: 20 CAPSULE | Refills: 0 | Status: SHIPPED | OUTPATIENT
Start: 2022-08-31 | End: 2022-09-10

## 2022-08-31 ASSESSMENT — PAIN SCALES - GENERAL: PAINLEVEL_OUTOF10: 10

## 2022-08-31 ASSESSMENT — PAIN DESCRIPTION - ORIENTATION: ORIENTATION: RIGHT

## 2022-08-31 ASSESSMENT — PAIN DESCRIPTION - LOCATION: LOCATION: ABDOMEN;FLANK

## 2022-08-31 ASSESSMENT — PAIN - FUNCTIONAL ASSESSMENT: PAIN_FUNCTIONAL_ASSESSMENT: 0-10

## 2022-08-31 ASSESSMENT — PAIN DESCRIPTION - DESCRIPTORS: DESCRIPTORS: ACHING;STABBING

## 2022-08-31 NOTE — ED PROVIDER NOTES
person, place, and time. GCS: GCS eye subscore is 4. GCS verbal subscore is 5. GCS motor subscore is 6. Cranial Nerves: No cranial nerve deficit. Sensory: No sensory deficit. Coordination: Coordination normal.      Gait: Gait normal.       Procedures    MDM  Number of Diagnoses or Management Options  Acute cystitis with hematuria  Diagnosis management comments: Patient in no acute distress. I spoke to patient that she needs to keep an eye on her symptoms. I will place her on Omnicef and also some medication for the nausea. I told her if symptoms worsen immediately go to the ER. Patient verbalizes understanding instructions given.             --------------------------------------------- PAST HISTORY ---------------------------------------------  Past Medical History:  has a past medical history of Arthritis, Back pain, Benign neoplasm of ovary, Blood transfusion, Carotid stenosis, Cerebral artery occlusion with cerebral infarction ([address] Fibromyalgia, History of blood transfusion, Hyperlipidemia, Lupus ([address] Pelvic peritoneal adhesions, female (postoperative) (postinfection), Right leg pain, Seizure ([address] Unspecified symptom associated with female genital organs, and Ureteral obstruction. Past Surgical History:  has a past surgical history that includes joint replacement (Right, pt had 3 knee replacements); Breast surgery (lt breast lumpectomy); Hysterectomy (partial hysterectomy); Foot surgery (rt foot spur);  section (2 c sections); Kidney surgery (ureteral obstruction as child); Abdominal adhesion surgery (aug 2011); Salpingo-oophorectomy (2012); back surgery (); Carpal tunnel release (Right, 2014); Knee arthroscopy (Left, 11/05/15); Carpal tunnel release (Left, 2016); other surgical history (Right, 2017); other surgical history (Left, 2017); other surgical history (2020); and Cardiac surgery.     Social History:  reports that she has never smoked. She has never used smokeless tobacco. She reports current alcohol use. She reports that she does not use drugs. Family History: family history includes Alzheimer's Disease in her mother; Breast Cancer in her maternal aunt and mother; COPD in her sister; Cancer in her father; Colon Cancer in her maternal aunt and maternal grandmother; Diabetes in her brother and father; Heart Attack in her mother; No Known Problems in her brother, brother, sister, and sister; Ovarian Cancer in her maternal cousin. The patients home medications have been reviewed. Allergies: Patient has no known allergies. -------------------------------------------------- RESULTS -------------------------------------------------  Results for orders placed or performed during the hospital encounter of 08/31/22   Urinalysis   Result Value Ref Range    Color, UA ORANGE (A) Straw/Yellow    Clarity, UA CLOUDY (A) Clear    Glucose, Ur 100 (A) Negative mg/dL    Bilirubin Urine Negative Negative    Ketones, Urine TRACE (A) Negative mg/dL    Specific Gravity, UA 1.020 1.005 - 1.030    Blood, Urine MODERATE (A) Negative    pH, UA 5.0 5.0 - 9.0    Protein, UA >=300 (A) Negative mg/dL    Urobilinogen, Urine 2.0 (A) <2.0 E.U./dL    Nitrite, Urine POSITIVE (A) Negative    Leukocyte Esterase, Urine TRACE (A) Negative   Microscopic Urinalysis   Result Value Ref Range    WBC, UA >20 (A) 0 - 5 /HPF    RBC, UA >20 0 - 2 /HPF    Epithelial Cells, UA RARE /HPF    Bacteria, UA MANY (A) None Seen /HPF     No orders to display       ------------------------- NURSING NOTES AND VITALS REVIEWED ---------------------------   The nursing notes within the ED encounter and vital signs as below have been reviewed.    BP (!) 174/70   Pulse 89   Temp 98.3 °F (36.8 °C) (Infrared)   Resp 18   Ht 5' 3\" (1.6 m)   Wt 228 lb (103.4 kg)   LMP 06/20/2012   SpO2 99%   BMI 40.39 kg/m²   Oxygen Saturation Interpretation: Normal      ------------------------------------------ PROGRESS NOTES ------------------------------------------   I have spoken with the patient and discussed todays results, in addition to providing specific details for the plan of care and counseling regarding the diagnosis and prognosis. Their questions are answered at this time and they are agreeable with the plan.      --------------------------------- ADDITIONAL PROVIDER NOTES ---------------------------------     This patient is stable for discharge. I have shared the specific conditions for return, as well as the importance of follow-up. * NOTE: This report was transcribed using voice recognition software. Every effort was made to ensure accuracy; however, inadvertent computerized transcription errors may be present.    --------------------------------- IMPRESSION AND DISPOSITION ---------------------------------    IMPRESSION  1.  Acute cystitis with hematuria        DISPOSITION  Disposition: Discharge to home  Patient condition is good       Bishop Macdonald PA-C  08/31/22 6425

## 2022-08-31 NOTE — Clinical Note
Carol Jeffrey was seen and treated in our emergency department on 8/31/2022. She may return to work on 09/06/2022. If you have any questions or concerns, please don't hesitate to call.       Miguel A River PA-C

## 2022-08-31 NOTE — TELEPHONE ENCOUNTER
Spoke to the patient on the phone. Reminded of her 715 a, stress test at [facility]. Cardiology and where to report. She was instructed on NPO after MN except meds with water but to hold her Toprol XL for 24 hour. She was also instructed on avoidance of caffeine products for 12 hours prior. She verbalized an understanding.

## 2022-09-03 LAB
ORGANISM: ABNORMAL
URINE CULTURE, ROUTINE: ABNORMAL

## 2022-09-13 ENCOUNTER — HOSPITAL ENCOUNTER (INPATIENT)
Age: 57
LOS: 3 days | Discharge: HOME | DRG: 854 | End: 2022-09-17
Attending: STUDENT IN AN ORGANIZED HEALTH CARE EDUCATION/TRAINING PROGRAM | Admitting: INTERNAL MEDICINE
Payer: COMMERCIAL

## 2022-09-13 ENCOUNTER — APPOINTMENT (OUTPATIENT)
Dept: CT IMAGING | Age: 57
DRG: 854 | End: 2022-09-13
Payer: COMMERCIAL

## 2022-09-13 DIAGNOSIS — R31.9 URINARY TRACT INFECTION WITH HEMATURIA, SITE UNSPECIFIED: ICD-10-CM

## 2022-09-13 DIAGNOSIS — N39.0 URINARY TRACT INFECTION WITH HEMATURIA, SITE UNSPECIFIED: ICD-10-CM

## 2022-09-13 DIAGNOSIS — N20.1 URETEROLITHIASIS: ICD-10-CM

## 2022-09-13 DIAGNOSIS — N20.0 KIDNEY STONES: ICD-10-CM

## 2022-09-13 DIAGNOSIS — N39.0 COMPLICATED UTI (URINARY TRACT INFECTION): Primary | ICD-10-CM

## 2022-09-13 LAB
ALBUMIN SERPL-MCNC: 4.2 G/DL (ref 3.5–5.2)
ALP BLD-CCNC: 95 U/L (ref 35–104)
ALT SERPL-CCNC: 12 U/L (ref 0–32)
ANION GAP SERPL CALCULATED.3IONS-SCNC: 12 MMOL/L (ref 7–16)
AST SERPL-CCNC: 17 U/L (ref 0–31)
BACTERIA: ABNORMAL /HPF
BASOPHILS ABSOLUTE: 0.02 E9/L (ref 0–0.2)
BASOPHILS RELATIVE PERCENT: 0.2 % (ref 0–2)
BILIRUB SERPL-MCNC: 0.5 MG/DL (ref 0–1.2)
BILIRUBIN URINE: NEGATIVE
BLOOD, URINE: ABNORMAL
BUN BLDV-MCNC: 12 MG/DL (ref 6–20)
CALCIUM SERPL-MCNC: 10 MG/DL (ref 8.6–10.2)
CHLORIDE BLD-SCNC: 100 MMOL/L (ref 98–107)
CLARITY: ABNORMAL
CO2: 26 MMOL/L (ref 22–29)
COLOR: YELLOW
CREAT SERPL-MCNC: 0.8 MG/DL (ref 0.5–1)
EOSINOPHILS ABSOLUTE: 0.03 E9/L (ref 0.05–0.5)
EOSINOPHILS RELATIVE PERCENT: 0.3 % (ref 0–6)
EPITHELIAL CELLS, UA: ABNORMAL /HPF
GFR AFRICAN AMERICAN: >60
GFR NON-AFRICAN AMERICAN: >60 ML/MIN/1.73
GLUCOSE BLD-MCNC: 90 MG/DL (ref 74–99)
GLUCOSE URINE: NEGATIVE MG/DL
HCT VFR BLD CALC: 37.2 % (ref 34–48)
HEMOGLOBIN: 12.1 G/DL (ref 11.5–15.5)
IMMATURE GRANULOCYTES #: 0.04 E9/L
IMMATURE GRANULOCYTES %: 0.4 % (ref 0–5)
KETONES, URINE: NEGATIVE MG/DL
LACTIC ACID: 0.7 MMOL/L (ref 0.5–2.2)
LEUKOCYTE ESTERASE, URINE: ABNORMAL
LYMPHOCYTES ABSOLUTE: 1.35 E9/L (ref 1.5–4)
LYMPHOCYTES RELATIVE PERCENT: 12.9 % (ref 20–42)
MCH RBC QN AUTO: 29.2 PG (ref 26–35)
MCHC RBC AUTO-ENTMCNC: 32.5 % (ref 32–34.5)
MCV RBC AUTO: 89.6 FL (ref 80–99.9)
MONOCYTES ABSOLUTE: 0.63 E9/L (ref 0.1–0.95)
MONOCYTES RELATIVE PERCENT: 6 % (ref 2–12)
NEUTROPHILS ABSOLUTE: 8.39 E9/L (ref 1.8–7.3)
NEUTROPHILS RELATIVE PERCENT: 80.2 % (ref 43–80)
NITRITE, URINE: POSITIVE
PDW BLD-RTO: 15.3 FL (ref 11.5–15)
PH UA: 6 (ref 5–9)
PLATELET # BLD: 298 E9/L (ref 130–450)
PMV BLD AUTO: 10.1 FL (ref 7–12)
POTASSIUM REFLEX MAGNESIUM: 3.8 MMOL/L (ref 3.5–5)
PROTEIN UA: 100 MG/DL
RBC # BLD: 4.15 E12/L (ref 3.5–5.5)
RBC UA: ABNORMAL /HPF (ref 0–2)
SODIUM BLD-SCNC: 138 MMOL/L (ref 132–146)
SPECIFIC GRAVITY UA: <=1.005 (ref 1–1.03)
TOTAL PROTEIN: 8.1 G/DL (ref 6.4–8.3)
UROBILINOGEN, URINE: 0.2 E.U./DL
WBC # BLD: 10.5 E9/L (ref 4.5–11.5)
WBC UA: >20 /HPF (ref 0–5)

## 2022-09-13 PROCEDURE — 85025 COMPLETE CBC W/AUTO DIFF WBC: CPT

## 2022-09-13 PROCEDURE — 36415 COLL VENOUS BLD VENIPUNCTURE: CPT

## 2022-09-13 PROCEDURE — 74177 CT ABD & PELVIS W/CONTRAST: CPT

## 2022-09-13 PROCEDURE — 83605 ASSAY OF LACTIC ACID: CPT

## 2022-09-13 PROCEDURE — 87077 CULTURE AEROBIC IDENTIFY: CPT

## 2022-09-13 PROCEDURE — 6360000004 HC RX CONTRAST MEDICATION: Performed by: RADIOLOGY

## 2022-09-13 PROCEDURE — 81001 URINALYSIS AUTO W/SCOPE: CPT

## 2022-09-13 PROCEDURE — 99285 EMERGENCY DEPT VISIT HI MDM: CPT

## 2022-09-13 PROCEDURE — 87186 SC STD MICRODIL/AGAR DIL: CPT

## 2022-09-13 PROCEDURE — 96374 THER/PROPH/DIAG INJ IV PUSH: CPT

## 2022-09-13 PROCEDURE — 96375 TX/PRO/DX INJ NEW DRUG ADDON: CPT

## 2022-09-13 PROCEDURE — 87088 URINE BACTERIA CULTURE: CPT

## 2022-09-13 PROCEDURE — 87040 BLOOD CULTURE FOR BACTERIA: CPT

## 2022-09-13 PROCEDURE — 80053 COMPREHEN METABOLIC PANEL: CPT

## 2022-09-13 RX ORDER — KETOROLAC TROMETHAMINE 30 MG/ML
30 INJECTION, SOLUTION INTRAMUSCULAR; INTRAVENOUS ONCE
Status: COMPLETED | OUTPATIENT
Start: 2022-09-14 | End: 2022-09-14

## 2022-09-13 RX ORDER — CEFTRIAXONE 2 G/1
2000 INJECTION, POWDER, FOR SOLUTION INTRAMUSCULAR; INTRAVENOUS ONCE
Status: DISCONTINUED | OUTPATIENT
Start: 2022-09-14 | End: 2022-09-14

## 2022-09-13 RX ADMIN — IOPAMIDOL 75 ML: 755 INJECTION, SOLUTION INTRAVENOUS at 21:25

## 2022-09-13 ASSESSMENT — PAIN - FUNCTIONAL ASSESSMENT: PAIN_FUNCTIONAL_ASSESSMENT: 0-10

## 2022-09-13 NOTE — LETTER
[address] [facility] [address]  Phone: [phone]    No name on file. September 17, 2022     Patient: Apolinar Youngblood   YOB: 1965   Date of Visit: 9/13/2022       To Whom It May Concern: It is my medical opinion that Erin Zapata should remain out of work until her post op follow up appointments. If you have any questions or concerns, please don't hesitate to call.     Sincerely,      Leonardo Butler MD/Liane Fernández RN

## 2022-09-14 ENCOUNTER — TELEPHONE (OUTPATIENT)
Dept: CARDIOLOGY | Age: 57
End: 2022-09-14

## 2022-09-14 ENCOUNTER — ANESTHESIA EVENT (OUTPATIENT)
Dept: OPERATING ROOM | Age: 57
DRG: 854 | End: 2022-09-14
Payer: COMMERCIAL

## 2022-09-14 ENCOUNTER — APPOINTMENT (OUTPATIENT)
Dept: GENERAL RADIOLOGY | Age: 57
DRG: 854 | End: 2022-09-14
Payer: COMMERCIAL

## 2022-09-14 ENCOUNTER — ANESTHESIA (OUTPATIENT)
Dept: OPERATING ROOM | Age: 57
DRG: 854 | End: 2022-09-14
Payer: COMMERCIAL

## 2022-09-14 PROBLEM — N39.0 COMPLICATED UTI (URINARY TRACT INFECTION): Status: ACTIVE | Noted: 2022-09-14

## 2022-09-14 PROBLEM — N20.1 URETEROLITHIASIS: Status: ACTIVE | Noted: 2022-09-14

## 2022-09-14 LAB
ACINETOBACTER CALCOAC BAUMANNII COMPLEX BY PCR: NOT DETECTED
BACTEROIDES FRAGILIS BY PCR: NOT DETECTED
BOTTLE TYPE: ABNORMAL
CANDIDA ALBICANS BY PCR: NOT DETECTED
CANDIDA AURIS BY PCR: NOT DETECTED
CANDIDA GLABRATA BY PCR: NOT DETECTED
CANDIDA KRUSEI BY PCR: NOT DETECTED
CANDIDA PARAPSILOSIS BY PCR: NOT DETECTED
CANDIDA TROPICALIS BY PCR: NOT DETECTED
CARBAPENEM RESISTANCE IMP GENE BY PCR: NOT DETECTED
CARBAPENEM RESISTANCE KPC BY PCR: NOT DETECTED
CARBAPENEM RESISTANCE NDM GENE BY PCR: NOT DETECTED
CARBAPENEM RESISTANCE OXA-48 GENE BY PCR: NOT DETECTED
CARBAPENEM RESISTANCE VIM GENE BY PCR: NOT DETECTED
CEPHALOSPORIN RESISTANCE CTX-M GENE BY PCR: NOT DETECTED
COLISTIN RESISTANCE MCR-1 GENE BY PCR: NOT DETECTED
CRYPTOCOCCUS NEOFORMANS/GATTII BY PCR: NOT DETECTED
ENTEROBACTER CLOACAE COMPLEX BY PCR: NOT DETECTED
ENTEROBACTERALES BY PCR: DETECTED
ENTEROCOCCUS FAECALIS BY PCR: NOT DETECTED
ENTEROCOCCUS FAECIUM BY PCR: NOT DETECTED
ESCHERICHIA COLI BY PCR: DETECTED
HAEMOPHILUS INFLUENZAE BY PCR: NOT DETECTED
KLEBSIELLA AEROGENES BY PCR: NOT DETECTED
KLEBSIELLA OXYTOCA BY PCR: NOT DETECTED
KLEBSIELLA PNEUMONIAE GROUP BY PCR: NOT DETECTED
LISTERIA MONOCYTOGENES BY PCR: NOT DETECTED
NEISSERIA MENINGITIDIS BY PCR: NOT DETECTED
ORDER NUMBER: ABNORMAL
PROTEUS SPECIES BY PCR: NOT DETECTED
PSEUDOMONAS AERUGINOSA BY PCR: NOT DETECTED
SALMONELLA SPECIES BY PCR: NOT DETECTED
SERRATIA MARCESCENS BY PCR: NOT DETECTED
SOURCE OF BLOOD CULTURE: ABNORMAL
STAPHYLOCOCCUS AUREUS BY PCR: NOT DETECTED
STAPHYLOCOCCUS EPIDERMIDIS BY PCR: NOT DETECTED
STAPHYLOCOCCUS LUGDUNENSIS BY PCR: NOT DETECTED
STAPHYLOCOCCUS SPECIES BY PCR: NOT DETECTED
STENOTROPHOMONAS MALTOPHILIA BY PCR: NOT DETECTED
STREPTOCOCCUS AGALACTIAE BY PCR: NOT DETECTED
STREPTOCOCCUS PNEUMONIAE BY PCR: NOT DETECTED
STREPTOCOCCUS PYOGENES  BY PCR: NOT DETECTED
STREPTOCOCCUS SPECIES BY PCR: NOT DETECTED

## 2022-09-14 PROCEDURE — C1769 GUIDE WIRE: HCPCS | Performed by: UROLOGY

## 2022-09-14 PROCEDURE — 74400 UROGRAPHY IV +-KUB TOMOG: CPT

## 2022-09-14 PROCEDURE — 87088 URINE BACTERIA CULTURE: CPT

## 2022-09-14 PROCEDURE — 6360000002 HC RX W HCPCS

## 2022-09-14 PROCEDURE — 7100000000 HC PACU RECOVERY - FIRST 15 MIN: Performed by: UROLOGY

## 2022-09-14 PROCEDURE — 2580000003 HC RX 258: Performed by: PHYSICIAN ASSISTANT

## 2022-09-14 PROCEDURE — 6360000002 HC RX W HCPCS: Performed by: UROLOGY

## 2022-09-14 PROCEDURE — 3700000000 HC ANESTHESIA ATTENDED CARE: Performed by: UROLOGY

## 2022-09-14 PROCEDURE — 6370000000 HC RX 637 (ALT 250 FOR IP): Performed by: UROLOGY

## 2022-09-14 PROCEDURE — 99223 1ST HOSP IP/OBS HIGH 75: CPT | Performed by: INTERNAL MEDICINE

## 2022-09-14 PROCEDURE — 2580000003 HC RX 258: Performed by: INTERNAL MEDICINE

## 2022-09-14 PROCEDURE — C2617 STENT, NON-COR, TEM W/O DEL: HCPCS | Performed by: UROLOGY

## 2022-09-14 PROCEDURE — 2580000003 HC RX 258

## 2022-09-14 PROCEDURE — 3600000013 HC SURGERY LEVEL 3 ADDTL 15MIN: Performed by: UROLOGY

## 2022-09-14 PROCEDURE — 6360000002 HC RX W HCPCS: Performed by: PHYSICIAN ASSISTANT

## 2022-09-14 PROCEDURE — 3600000003 HC SURGERY LEVEL 3 BASE: Performed by: UROLOGY

## 2022-09-14 PROCEDURE — 3700000001 HC ADD 15 MINUTES (ANESTHESIA): Performed by: UROLOGY

## 2022-09-14 PROCEDURE — 2709999900 HC NON-CHARGEABLE SUPPLY: Performed by: UROLOGY

## 2022-09-14 PROCEDURE — 6360000002 HC RX W HCPCS: Performed by: INTERNAL MEDICINE

## 2022-09-14 PROCEDURE — 2500000003 HC RX 250 WO HCPCS

## 2022-09-14 PROCEDURE — 0T768DZ DILATION OF RIGHT URETER WITH INTRALUMINAL DEVICE, VIA NATURAL OR ARTIFICIAL OPENING ENDOSCOPIC: ICD-10-PCS | Performed by: UROLOGY

## 2022-09-14 PROCEDURE — 1200000000 HC SEMI PRIVATE

## 2022-09-14 PROCEDURE — 7100000001 HC PACU RECOVERY - ADDTL 15 MIN: Performed by: UROLOGY

## 2022-09-14 PROCEDURE — 6370000000 HC RX 637 (ALT 250 FOR IP): Performed by: INTERNAL MEDICINE

## 2022-09-14 DEVICE — URETERAL STENT
Type: IMPLANTABLE DEVICE | Site: URETER | Status: FUNCTIONAL
Brand: PERCUFLEX™

## 2022-09-14 RX ORDER — ACETAMINOPHEN 325 MG/1
650 TABLET ORAL EVERY 6 HOURS PRN
Status: DISCONTINUED | OUTPATIENT
Start: 2022-09-14 | End: 2022-09-17 | Stop reason: HOSPADM

## 2022-09-14 RX ORDER — SODIUM CHLORIDE, SODIUM LACTATE, POTASSIUM CHLORIDE, CALCIUM CHLORIDE 600; 310; 30; 20 MG/100ML; MG/100ML; MG/100ML; MG/100ML
INJECTION, SOLUTION INTRAVENOUS CONTINUOUS PRN
Status: DISCONTINUED | OUTPATIENT
Start: 2022-09-14 | End: 2022-09-14 | Stop reason: SDUPTHER

## 2022-09-14 RX ORDER — LIDOCAINE HYDROCHLORIDE 20 MG/ML
INJECTION, SOLUTION EPIDURAL; INFILTRATION; INTRACAUDAL; PERINEURAL PRN
Status: DISCONTINUED | OUTPATIENT
Start: 2022-09-14 | End: 2022-09-14 | Stop reason: SDUPTHER

## 2022-09-14 RX ORDER — KETOROLAC TROMETHAMINE 30 MG/ML
INJECTION, SOLUTION INTRAMUSCULAR; INTRAVENOUS PRN
Status: DISCONTINUED | OUTPATIENT
Start: 2022-09-14 | End: 2022-09-14 | Stop reason: SDUPTHER

## 2022-09-14 RX ORDER — SUCCINYLCHOLINE/SOD CL,ISO/PF 200MG/10ML
SYRINGE (ML) INTRAVENOUS PRN
Status: DISCONTINUED | OUTPATIENT
Start: 2022-09-14 | End: 2022-09-14 | Stop reason: SDUPTHER

## 2022-09-14 RX ORDER — SODIUM CHLORIDE 0.9 % (FLUSH) 0.9 %
10 SYRINGE (ML) INJECTION PRN
Status: DISCONTINUED | OUTPATIENT
Start: 2022-09-14 | End: 2022-09-17 | Stop reason: HOSPADM

## 2022-09-14 RX ORDER — POLYETHYLENE GLYCOL 3350 17 G/17G
17 POWDER, FOR SOLUTION ORAL DAILY PRN
Status: DISCONTINUED | OUTPATIENT
Start: 2022-09-14 | End: 2022-09-17 | Stop reason: HOSPADM

## 2022-09-14 RX ORDER — SODIUM CHLORIDE 9 MG/ML
INJECTION, SOLUTION INTRAVENOUS CONTINUOUS
Status: DISCONTINUED | OUTPATIENT
Start: 2022-09-14 | End: 2022-09-17 | Stop reason: HOSPADM

## 2022-09-14 RX ORDER — DEXAMETHASONE SODIUM PHOSPHATE 10 MG/ML
INJECTION, SOLUTION INTRAMUSCULAR; INTRAVENOUS PRN
Status: DISCONTINUED | OUTPATIENT
Start: 2022-09-14 | End: 2022-09-14 | Stop reason: SDUPTHER

## 2022-09-14 RX ORDER — SODIUM CHLORIDE 0.9 % (FLUSH) 0.9 %
10 SYRINGE (ML) INJECTION EVERY 12 HOURS SCHEDULED
Status: DISCONTINUED | OUTPATIENT
Start: 2022-09-14 | End: 2022-09-17 | Stop reason: HOSPADM

## 2022-09-14 RX ORDER — ACETAMINOPHEN 650 MG/1
650 SUPPOSITORY RECTAL EVERY 6 HOURS PRN
Status: DISCONTINUED | OUTPATIENT
Start: 2022-09-14 | End: 2022-09-17 | Stop reason: HOSPADM

## 2022-09-14 RX ORDER — MIDAZOLAM HYDROCHLORIDE 1 MG/ML
INJECTION INTRAMUSCULAR; INTRAVENOUS PRN
Status: DISCONTINUED | OUTPATIENT
Start: 2022-09-14 | End: 2022-09-14 | Stop reason: SDUPTHER

## 2022-09-14 RX ORDER — ATORVASTATIN CALCIUM 40 MG/1
40 TABLET, FILM COATED ORAL DAILY
Status: DISCONTINUED | OUTPATIENT
Start: 2022-09-14 | End: 2022-09-17 | Stop reason: HOSPADM

## 2022-09-14 RX ORDER — SODIUM CHLORIDE 9 MG/ML
INJECTION, SOLUTION INTRAVENOUS PRN
Status: DISCONTINUED | OUTPATIENT
Start: 2022-09-14 | End: 2022-09-17 | Stop reason: HOSPADM

## 2022-09-14 RX ORDER — ASPIRIN 81 MG/1
81 TABLET ORAL DAILY
Status: DISCONTINUED | OUTPATIENT
Start: 2022-09-14 | End: 2022-09-17 | Stop reason: HOSPADM

## 2022-09-14 RX ORDER — PANTOPRAZOLE SODIUM 40 MG/1
40 TABLET, DELAYED RELEASE ORAL
Status: DISCONTINUED | OUTPATIENT
Start: 2022-09-14 | End: 2022-09-17 | Stop reason: HOSPADM

## 2022-09-14 RX ORDER — HYDROMORPHONE HYDROCHLORIDE 1 MG/ML
0.5 INJECTION, SOLUTION INTRAMUSCULAR; INTRAVENOUS; SUBCUTANEOUS EVERY 4 HOURS PRN
Status: DISCONTINUED | OUTPATIENT
Start: 2022-09-14 | End: 2022-09-15

## 2022-09-14 RX ORDER — FENTANYL CITRATE 50 UG/ML
INJECTION, SOLUTION INTRAMUSCULAR; INTRAVENOUS PRN
Status: DISCONTINUED | OUTPATIENT
Start: 2022-09-14 | End: 2022-09-14 | Stop reason: SDUPTHER

## 2022-09-14 RX ORDER — PROPOFOL 10 MG/ML
INJECTION, EMULSION INTRAVENOUS PRN
Status: DISCONTINUED | OUTPATIENT
Start: 2022-09-14 | End: 2022-09-14 | Stop reason: SDUPTHER

## 2022-09-14 RX ORDER — ONDANSETRON 2 MG/ML
INJECTION INTRAMUSCULAR; INTRAVENOUS PRN
Status: DISCONTINUED | OUTPATIENT
Start: 2022-09-14 | End: 2022-09-14 | Stop reason: SDUPTHER

## 2022-09-14 RX ORDER — PROMETHAZINE HYDROCHLORIDE 25 MG/1
12.5 TABLET ORAL EVERY 6 HOURS PRN
Status: DISCONTINUED | OUTPATIENT
Start: 2022-09-14 | End: 2022-09-17 | Stop reason: HOSPADM

## 2022-09-14 RX ORDER — ONDANSETRON 2 MG/ML
4 INJECTION INTRAMUSCULAR; INTRAVENOUS EVERY 6 HOURS PRN
Status: DISCONTINUED | OUTPATIENT
Start: 2022-09-14 | End: 2022-09-17 | Stop reason: HOSPADM

## 2022-09-14 RX ORDER — BUSPIRONE HYDROCHLORIDE 10 MG/1
10 TABLET ORAL 2 TIMES DAILY
Status: DISCONTINUED | OUTPATIENT
Start: 2022-09-14 | End: 2022-09-17 | Stop reason: HOSPADM

## 2022-09-14 RX ORDER — ENOXAPARIN SODIUM 100 MG/ML
30 INJECTION SUBCUTANEOUS 2 TIMES DAILY
Status: DISCONTINUED | OUTPATIENT
Start: 2022-09-14 | End: 2022-09-17 | Stop reason: HOSPADM

## 2022-09-14 RX ORDER — METOPROLOL SUCCINATE 25 MG/1
25 TABLET, EXTENDED RELEASE ORAL DAILY
Status: DISCONTINUED | OUTPATIENT
Start: 2022-09-14 | End: 2022-09-17 | Stop reason: HOSPADM

## 2022-09-14 RX ADMIN — PANTOPRAZOLE SODIUM 40 MG: 40 TABLET, DELAYED RELEASE ORAL at 05:25

## 2022-09-14 RX ADMIN — DEXAMETHASONE SODIUM PHOSPHATE 10 MG: 10 INJECTION, SOLUTION INTRAMUSCULAR; INTRAVENOUS at 13:44

## 2022-09-14 RX ADMIN — Medication 100 MG: at 13:44

## 2022-09-14 RX ADMIN — KETOROLAC TROMETHAMINE 30 MG: 30 INJECTION, SOLUTION INTRAMUSCULAR; INTRAVENOUS at 14:01

## 2022-09-14 RX ADMIN — HYDROMORPHONE HYDROCHLORIDE 0.5 MG: 1 INJECTION, SOLUTION INTRAMUSCULAR; INTRAVENOUS; SUBCUTANEOUS at 17:27

## 2022-09-14 RX ADMIN — BUSPIRONE HYDROCHLORIDE 10 MG: 10 TABLET ORAL at 20:50

## 2022-09-14 RX ADMIN — SODIUM CHLORIDE: 9 INJECTION, SOLUTION INTRAVENOUS at 05:02

## 2022-09-14 RX ADMIN — SODIUM CHLORIDE: 9 INJECTION, SOLUTION INTRAVENOUS at 03:56

## 2022-09-14 RX ADMIN — ACETAMINOPHEN 650 MG: 325 TABLET ORAL at 07:12

## 2022-09-14 RX ADMIN — WATER 2000 MG: 1 INJECTION INTRAMUSCULAR; INTRAVENOUS; SUBCUTANEOUS at 02:01

## 2022-09-14 RX ADMIN — FENTANYL CITRATE 50 MCG: 50 INJECTION, SOLUTION INTRAMUSCULAR; INTRAVENOUS at 14:01

## 2022-09-14 RX ADMIN — ENOXAPARIN SODIUM 30 MG: 100 INJECTION SUBCUTANEOUS at 20:51

## 2022-09-14 RX ADMIN — MIDAZOLAM 2 MG: 1 INJECTION INTRAMUSCULAR; INTRAVENOUS at 13:35

## 2022-09-14 RX ADMIN — PHENYLEPHRINE HYDROCHLORIDE 100 MCG: 10 INJECTION INTRAVENOUS at 13:55

## 2022-09-14 RX ADMIN — HYDROMORPHONE HYDROCHLORIDE 0.5 MG: 1 INJECTION, SOLUTION INTRAMUSCULAR; INTRAVENOUS; SUBCUTANEOUS at 04:23

## 2022-09-14 RX ADMIN — SODIUM CHLORIDE, POTASSIUM CHLORIDE, SODIUM LACTATE AND CALCIUM CHLORIDE: 600; 310; 30; 20 INJECTION, SOLUTION INTRAVENOUS at 13:32

## 2022-09-14 RX ADMIN — PHENYLEPHRINE HYDROCHLORIDE 100 MCG: 10 INJECTION INTRAVENOUS at 14:00

## 2022-09-14 RX ADMIN — BUSPIRONE HYDROCHLORIDE 10 MG: 10 TABLET ORAL at 08:43

## 2022-09-14 RX ADMIN — Medication 140 MG: at 13:44

## 2022-09-14 RX ADMIN — METOPROLOL SUCCINATE 25 MG: 25 TABLET, EXTENDED RELEASE ORAL at 08:43

## 2022-09-14 RX ADMIN — KETOROLAC TROMETHAMINE 30 MG: 30 INJECTION, SOLUTION INTRAMUSCULAR; INTRAVENOUS at 01:23

## 2022-09-14 RX ADMIN — FENTANYL CITRATE 50 MCG: 50 INJECTION, SOLUTION INTRAMUSCULAR; INTRAVENOUS at 13:50

## 2022-09-14 RX ADMIN — HYDROMORPHONE HYDROCHLORIDE 0.5 MG: 1 INJECTION, SOLUTION INTRAMUSCULAR; INTRAVENOUS; SUBCUTANEOUS at 08:42

## 2022-09-14 RX ADMIN — PROPOFOL 200 MG: 10 INJECTION, EMULSION INTRAVENOUS at 13:44

## 2022-09-14 RX ADMIN — ONDANSETRON 4 MG: 2 INJECTION INTRAMUSCULAR; INTRAVENOUS at 14:01

## 2022-09-14 ASSESSMENT — PAIN DESCRIPTION - ORIENTATION
ORIENTATION: RIGHT

## 2022-09-14 ASSESSMENT — PAIN SCALES - GENERAL
PAINLEVEL_OUTOF10: 0
PAINLEVEL_OUTOF10: 3
PAINLEVEL_OUTOF10: 3
PAINLEVEL_OUTOF10: 8
PAINLEVEL_OUTOF10: 7
PAINLEVEL_OUTOF10: 0
PAINLEVEL_OUTOF10: 3

## 2022-09-14 ASSESSMENT — PAIN DESCRIPTION - DESCRIPTORS
DESCRIPTORS: DISCOMFORT
DESCRIPTORS: ACHING
DESCRIPTORS: DISCOMFORT

## 2022-09-14 ASSESSMENT — ENCOUNTER SYMPTOMS
SHORTNESS OF BREATH: 0
DYSPNEA ACTIVITY LEVEL: AFTER AMBULATING 1 FLIGHT OF STAIRS

## 2022-09-14 ASSESSMENT — PAIN DESCRIPTION - LOCATION
LOCATION: FLANK

## 2022-09-14 ASSESSMENT — LIFESTYLE VARIABLES
HOW OFTEN DO YOU HAVE A DRINK CONTAINING ALCOHOL: 2-4 TIMES A MONTH
SMOKING_STATUS: 0
HOW MANY STANDARD DRINKS CONTAINING ALCOHOL DO YOU HAVE ON A TYPICAL DAY: 1 OR 2

## 2022-09-14 NOTE — ANESTHESIA PRE PROCEDURE
Department of Anesthesiology  Preprocedure Note       Name:  Corie Marshall   Age:  61 y.o.  :  1965                                          MRN:  [id number]         Date:  2022      Surgeon: Lord Pemberton):  Machelle Preciado DO    Procedure: Procedure(s):  CYSTOSCOPY RETROGRADE PYELOGRAM, RIGHT  STENT INSERTION POSSIBLE URETEROSCOPY POSSIBLE LASER LITHOTRIPSY    Medications prior to admission:   Prior to Admission medications    Medication Sig Start Date End Date Taking?  Authorizing Provider   ondansetron (ZOFRAN ODT) 4 MG disintegrating tablet Take 1 tablet by mouth every 8 hours as needed for Nausea or Vomiting 22   Gulshan Carranza PA-C RA VITAMIN C 250 MG tablet take 1 tablet by mouth once daily 22   Historical Provider, MD   FEROSUL 325 (65 Fe) MG tablet take 1 tablet by mouth once daily with VITAMIN C 22   Historical Provider, MD   omeprazole (PRILOSEC) 10 MG delayed release capsule take 1 capsule by mouth once daily BEFORE A MEAL 22   Historical Provider, MD   metoprolol succinate (TOPROL XL) 25 MG extended release tablet Take 1 tablet by mouth daily 22   Ted Stevens MD   atorvastatin (LIPITOR) 40 MG tablet Take 1 tablet by mouth daily 22   Ted Stevens MD   busPIRone (BUSPAR) 10 MG tablet take 1 tablet by mouth twice a day 21   Historical Provider, MD   meclizine (ANTIVERT) 25 MG tablet Take 25 mg by mouth 3 times daily as needed    Historical Provider, MD   aspirin (PX ENTERIC ASPIRIN) 81 MG EC tablet Take 1 tablet by mouth daily 20   Ted Stevens MD   Multiple Vitamin (MULTI-VITAMIN DAILY PO) Take by mouth daily  Patient not taking: Reported on 2022    Historical Provider, MD       Current medications:    Current Facility-Administered Medications   Medication Dose Route Frequency Provider Last Rate Last Admin    HYDROmorphone HCl PF (DILAUDID) injection 0.5 mg  0.5 mg IntraVENous Q4H PRN Halley Oropeza MD   0.5 mg at 22 0842    [START ON 9/15/2022] cefTRIAXone (ROCEPHIN) 1,000 mg in sterile water 10 mL IV syringe  1,000 mg IntraVENous Q24H Jessy Fabian MD        0.9 % sodium chloride infusion   IntraVENous Continuous Jessy Fabian  mL/hr at 09/14/22 0502 New Bag at 09/14/22 0502    sodium chloride flush 0.9 % injection 10 mL  10 mL IntraVENous 2 times per day Jessy Fabian MD        sodium chloride flush 0.9 % injection 10 mL  10 mL IntraVENous PRN Jessy Fabian MD        0.9 % sodium chloride infusion   IntraVENous PRN Jessy Fabian MD        enoxaparin Sodium (LOVENOX) injection 30 mg  30 mg SubCUTAneous BID Jessy Fabian MD        promethazine (PHENERGAN) tablet 12.5 mg  12.5 mg Oral Q6H PRN Jessy Fabian MD        Or    ondansetron (ZOFRAN) injection 4 mg  4 mg IntraVENous Q6H PRN Jessy Fabian MD        polyethylene glycol (GLYCOLAX) packet 17 g  17 g Oral Daily PRN Jessy Fabian MD        acetaminophen (TYLENOL) tablet 650 mg  650 mg Oral Q6H PRN Jessy Fabian MD   650 mg at 09/14/22 1031    Or    acetaminophen (TYLENOL) suppository 650 mg  650 mg Rectal Q6H PRN Jessy Fabian MD        aspirin EC tablet 81 mg  81 mg Oral Daily Jessy Fabian MD        metoprolol succinate (TOPROL XL) extended release tablet 25 mg  25 mg Oral Daily Jessy Fabian MD   25 mg at 09/14/22 0843    atorvastatin (LIPITOR) tablet 40 mg  40 mg Oral Daily Jessy Fabian MD        pantoprazole (PROTONIX) tablet 40 mg  40 mg Oral QAM AC Jessy Fabian MD   40 mg at 09/14/22 0525    busPIRone (BUSPAR) tablet 10 mg  10 mg Oral BID Jessy Fabian MD   10 mg at 09/14/22 8777       Allergies:  No Known Allergies    Problem List:    Patient Active Problem List   Diagnosis Code    Benign neoplasm of ovary D27.9    Pelvic peritoneal adhesions, female (postoperative) (postinfection) N73.6    Female genital symptoms N94.9    Degenerative arthritis of left knee M17.12    Synovitis of knee M65.9    Medial meniscus tear L S83.249A    Osteophyte of left knee (trochlea and tibial spine) M25.762    Left carpal tunnel syndrome G56.02    CMC arthritis M19.049    [facility] DJD(carpometacarpal degenerative joint disease), localized primary M19.049    Chronic pain G89.29    TIA (transient ischemic attack) G45.9    Fibromyalgia M79.7    Seizure (HCC) R56.9    Stenosis of right carotid artery I65.21    Pain syndrome, chronic G89.4    Transient cerebral ischemia G45.9    Palpitations R00.2    Atypical chest pain R07.89    AMARAL (dyspnea on exertion) R06.00    Pure hypercholesterolemia E78.00    Moderate obesity E66.8    PFO (patent foramen ovale) Q21.1    Fatigue R53.83    PVC (premature ventricular contraction) I49.3    S/P percutaneous patent foramen ovale closure Z87.74    Ureterolithiasis V24.3    Complicated UTI (urinary tract infection) N39.0       Past Medical History:        Diagnosis Date    Arthritis arthritis back    and legs,     Back pain arthritis    Benign neoplasm of ovary 2012    Blood transfusion     Carotid stenosis     Cerebral artery occlusion with cerebral infarction ([address]     Fibromyalgia     History of blood transfusion     Hyperlipidemia     Lupus (HCC)     questionable    Pelvic peritoneal adhesions, female (postoperative) (postinfection) 2012    Right leg pain     Seizure ([address]     last one 30 yrs ago- h/o epilepsy as a child    Unspecified symptom associated with female genital organs 2012    Ureteral obstruction as a child had surgery to correct        Past Surgical History:        Procedure Laterality Date    ABDOMINAL ADHESION SURGERY  aug 2011    laproscopy lysis of adhesions left ovarian cystotomy    BACK SURGERY      lumbar    BREAST SURGERY  lt breast lumpectomy    benign    CARDIAC SURGERY      MESH     CARPAL TUNNEL RELEASE Right 2014    CARPAL TUNNEL RELEASE Left 2016     SECTION  2 c sections    FOOT SURGERY  rt foot spur    HYSTERECTOMY (CERVIX STATUS UNKNOWN)  partial hysterectomy    JOINT REPLACEMENT Right pt had 3 knee replacements    KIDNEY SURGERY  ureteral obstruction as child    KNEE ARTHROSCOPY Left 11/05/15    medial menisectomy, chondroplasty, synovectomy, debridement, osteophytes    OTHER SURGICAL HISTORY Right 02/17/2017    hand CPC interpositional arthroplasty    OTHER SURGICAL HISTORY Left 09/08/2017    left carpal metacarpal arthroplasty    OTHER SURGICAL HISTORY  07/29/2020    Dr Chico Black - Amplatzer 25mm PFO occluder - Lot# [id number]    SALPINGO-OOPHORECTOMY  July 2012    Attempted laparoscopy, open laparotomy, lysis of adhesions, and BSO       Social History:    Social History     Tobacco Use    Smoking status: Never    Smokeless tobacco: Never   Substance Use Topics    Alcohol use: Yes     Comment: occ                                Counseling given: Not Answered      Vital Signs (Current):   Vitals:    09/14/22 0026 09/14/22 0210 09/14/22 0445 09/14/22 0510   BP: (!) 105/52 107/60  113/71   Pulse: 78 86  70   Resp: 18 18  18   Temp:    36.7 °C (98.1 °F)   TempSrc:    Oral   SpO2: 96% 94%  98%   Weight:   226 lb 1.6 oz (102.6 kg)    Height:   5' 3\" (1.6 m)                                               BP Readings from Last 3 Encounters:   09/14/22 113/71   08/31/22 (!) 174/70   08/19/22 124/70       NPO Status:  9/14/22 @ 0830                                                                               BMI:   Wt Readings from Last 3 Encounters:   09/14/22 226 lb 1.6 oz (102.6 kg)   08/31/22 228 lb (103.4 kg)   08/19/22 229 lb 9.6 oz (104.1 kg)     Body mass index is 40.05 kg/m².     CBC:   Lab Results   Component Value Date/Time    WBC 10.5 09/13/2022 06:21 PM    RBC 4.15 09/13/2022 06:21 PM    HGB 12.1 09/13/2022 06:21 PM    HCT 37.2 09/13/2022 06:21 PM    MCV 89.6 09/13/2022 06:21 PM    RDW 15.3 09/13/2022 06:21 PM     09/13/2022 06:21 PM       CMP:   Lab Results   Component Value Date/Time     09/13/2022 06:21 (+) seizures (seizures during childhood, no medication at this point):, CVA (no residual symptoms):, neuromuscular disease (fibromyalgia):, TIA, headaches (OTC medication for migraines): migraine headaches, depression/anxiety             GI/Hepatic/Renal:   (+) GERD:, renal disease: kidney stones,          ROS comment: Patient reports nausea. Endo/Other:    (+) : arthritis: OA., .                  ROS comment: Lupus Abdominal:   (+) obese,           Vascular:           ROS comment: 50-69% stenosis of the right carotid artery. Other Findings:           Anesthesia Plan      general     ASA 3       Induction: intravenous and rapid sequence. MIPS: Postoperative opioids intended, Prophylactic antiemetics administered and Postoperative trial extubation. Anesthetic plan and risks discussed with patient. Use of blood products discussed with patient whom consented to blood products. Plan discussed with attending.     Attending anesthesiologist reviewed and agrees with Preprocedure content                RUFUS Collins - MARIXA   9/14/2022

## 2022-09-14 NOTE — CARE COORDINATION
9-14-Cm note: ( no covid testing ) met with pt for transition of care needs, pt lives with her  , she is independent, no DME , hx [facility] [place] S/P knee replacement.  Pt anticipates going home with no needs, CM will follow Electronically signed by Nayana Sierra RN on 9/14/2022 at 12:00 PM

## 2022-09-14 NOTE — PROGRESS NOTES
Pt gone for exteneded period of time to surgery for uro procedure      Plan: History of present illness from History and Physical:  This is a 61 y.o. female  has a past medical history of Arthritis, Back pain, Benign neoplasm of ovary, Blood transfusion, Carotid stenosis, Cerebral artery occlusion with cerebral infarction ([address] Fibromyalgia, History of blood transfusion, Hyperlipidemia, Lupus ([address] Pelvic peritoneal adhesions, female (postoperative) (postinfection), Right leg pain, Seizure ([address] Unspecified symptom associated with female genital organs, and Ureteral obstruction. presented with Severe right sided flank pain  for last few days prior to arrival to the hospital. Flank pain is severe (8/10), sharp, radiating to the back. Initially pain was mild, intermittent but gradually getting more persistent. Started gradually. The patient was seen and examined at bedside, appears alert and awake with no acute distress and is able to answer simple  questions. On direct questioning, patient denied any  resting ongoing chest pain, resting SOB, hemoptysis, productive cough, fever, ongoing palpitation, active abdominal pain, hematemesis, rectal bleeding, masoud, hematuria, any other  and GI complaints, and any new focal neuro deficits . Complicated UTI with acute Rt hydronephrosis with 9x7 mm renal stone. BUN/Cr = WNL; IV Ceftriaxone   IV hydration, pain medication  Urology consult  When she was a child she did have some type of procedure for ureteral obstruction      Hypertension monitor on home medications. Monitor vitals and adjust BP meds as needed     Anxiety with depression  Currently stable with no acute changes     No change to outpatient treatment regimen for the existing stable combordities including:  Chronic back pain arthritis carotid stenosis cerebral infarction fibromyalgia hyperlipidemia seizure disorder.   It seems that in the past she was in the midst of a work-up for lupus with questionable results     DVT Prophylaxis :  Lovenox   Full code   Disposition Home

## 2022-09-14 NOTE — ANESTHESIA POSTPROCEDURE EVALUATION
Department of Anesthesiology  Postprocedure Note    Patient: Radha Horan  MRN: [id number]  YOB: 1965  Date of evaluation: 9/14/2022      Procedure Summary     Date: 09/14/22 Room / Location: [place] [address] / [facility] ([facility])    Anesthesia Start: 1409 Anesthesia Stop: 3750    Procedure: CYSTOSCOPY  RIGHT  STENT INSERTION (Right: Ureter) Diagnosis:       Kidney stones      (Kidney stones [N20.0])    Surgeons: Daisy Francis DO Responsible Provider: Yifan Rodriges DO    Anesthesia Type: general ASA Status: 3          Anesthesia Type: No value filed.     Umesh Phase I: Umesh Score: 9    Umesh Phase II:        Anesthesia Post Evaluation    Patient location during evaluation: PACU  Patient participation: complete - patient participated  Level of consciousness: awake and alert  Pain score: 0  Airway patency: patent  Nausea & Vomiting: no nausea and no vomiting  Complications: no  Cardiovascular status: hemodynamically stable  Respiratory status: acceptable  Hydration status: euvolemic

## 2022-09-14 NOTE — CONSULTS
2022 8:30 AM  Pat Arnett  [id number]     Chief Complaint:    9mm right mid ureteral calculi with hydronephrosis      History of Present Illness: The patient is a 61 y.o. female patient who presented to the hospital with complaints of right flank pain. She had a CT abdomen pelvis performed that showed a 9mm right mid/distal ureteral calculi with right hydronephrosis. Creatinine is stable. No leukocytosis. She was admitted. She was seen in the ER on 22 for right flank pain and diagnosed with  UTI and sent home with EKTA BANEGAS. She describes a history of ureteral obstruction as a child (13yrs old). Believes had ureteral reconstruction and this was done with Dr. Ariana Garcia. She did have a nephrostomy tube following this for several weeks. She has not seen a Urologist since that time. Denies history of stones. Her right flank pain is being controlled with IV Dilaudid at this time.        Past Medical History:   Diagnosis Date    Arthritis arthritis back    and legs,     Back pain arthritis    Benign neoplasm of ovary 2012    Blood transfusion     Carotid stenosis     Cerebral artery occlusion with cerebral infarction (HCC)     Fibromyalgia     History of blood transfusion     Hyperlipidemia     Lupus ([address]     questionable    Pelvic peritoneal adhesions, female (postoperative) (postinfection) 2012    Right leg pain     Seizure ([address]     last one 30 yrs ago- h/o epilepsy as a child    Unspecified symptom associated with female genital organs 2012    Ureteral obstruction as a child had surgery to correct          Past Surgical History:   Procedure Laterality Date    ABDOMINAL ADHESION SURGERY  aug 2011    laproscopy lysis of adhesions left ovarian cystotomy    BACK SURGERY      lumbar    BREAST SURGERY  lt breast lumpectomy    benign    CARDIAC SURGERY      MESH     CARPAL TUNNEL RELEASE Right 2014    CARPAL TUNNEL RELEASE Left 2016     SECTION  2 c sections    FOOT SURGERY  rt foot spur    HYSTERECTOMY (CERVIX STATUS UNKNOWN)  partial hysterectomy    JOINT REPLACEMENT Right pt had 3 knee replacements    KIDNEY SURGERY  ureteral obstruction as child    KNEE ARTHROSCOPY Left 11/05/15    medial menisectomy, chondroplasty, synovectomy, debridement, osteophytes    OTHER SURGICAL HISTORY Right 02/17/2017    hand CPC interpositional arthroplasty    OTHER SURGICAL HISTORY Left 09/08/2017    left carpal metacarpal arthroplasty    OTHER SURGICAL HISTORY  07/29/2020    Dr Jaye Banuelos - Amplatzer 25mm PFO occluder - Lot# [id number]    SALPINGO-OOPHORECTOMY  July 2012    Attempted laparoscopy, open laparotomy, lysis of adhesions, and BSO       Medications Prior to Admission:    Medications Prior to Admission: ondansetron (ZOFRAN ODT) 4 MG disintegrating tablet, Take 1 tablet by mouth every 8 hours as needed for Nausea or Vomiting  RA VITAMIN C 250 MG tablet, take 1 tablet by mouth once daily  FEROSUL 325 (65 Fe) MG tablet, take 1 tablet by mouth once daily with VITAMIN C  omeprazole (PRILOSEC) 10 MG delayed release capsule, take 1 capsule by mouth once daily BEFORE A MEAL  metoprolol succinate (TOPROL XL) 25 MG extended release tablet, Take 1 tablet by mouth daily  atorvastatin (LIPITOR) 40 MG tablet, Take 1 tablet by mouth daily  busPIRone (BUSPAR) 10 MG tablet, take 1 tablet by mouth twice a day  meclizine (ANTIVERT) 25 MG tablet, Take 25 mg by mouth 3 times daily as needed  aspirin (PX ENTERIC ASPIRIN) 81 MG EC tablet, Take 1 tablet by mouth daily  Multiple Vitamin (MULTI-VITAMIN DAILY PO), Take by mouth daily (Patient not taking: Reported on 9/14/2022)    Allergies:    Patient has no known allergies. Social History:    reports that she has never smoked. She has never used smokeless tobacco. She reports current alcohol use. She reports that she does not use drugs.     Family History:   Non-contributory to this Urological problem  family history includes Alzheimer's Disease in her mother; Breast Cancer in her maternal aunt and mother; COPD in her sister; Cancer in her father; Colon Cancer in her maternal aunt and maternal grandmother; Diabetes in her brother and father; Heart Attack in her mother; No Known Problems in her brother, brother, sister, and sister; Ovarian Cancer in her maternal cousin. Review of Systems:  Constitutional: No fever or chills   Respiratory: negative for cough and hemoptysis  Cardiovascular: negative for chest pain and dyspnea  Gastrointestinal: negative for abdominal pain, diarrhea, nausea and vomiting   Derm: negative for rash and skin lesion(s)  Neurological: negative for seizures and tremors  Musculoskeletal: Negative    Psychiatric: Negative   : As above in the HPI, otherwise negative  All other reviews are negative    Physical Exam:     Vitals:  /71   Pulse 70   Temp 98.1 °F (36.7 °C) (Oral)   Resp 18   Ht 5' 3\" (1.6 m)   Wt 226 lb 1.6 oz (102.6 kg)   LMP 06/20/2012   SpO2 98%   BMI 40.05 kg/m²     General:  Awake, alert, oriented X 3. No apparent distress. HEENT:  Normocephalic, atraumatic. Lungs:  Respirations symmetric and non-labored. Abdomen:  soft, nontender, no masses  Extremities:  No clubbing, cyanosis, or edema  Skin:  Warm and dry, no open lesions or rashes  Neuro: There are no motor or sensory deficits in the 4 quadrant extremities   Rectal: deferred  Genitourinary:  no ramos     Labs:     Recent Labs     09/13/22  1821   WBC 10.5   RBC 4.15   HGB 12.1   HCT 37.2   MCV 89.6   MCH 29.2   MCHC 32.5   RDW 15.3*      MPV 10.1         Recent Labs     09/13/22  1821   CREATININE 0.8       No results found for: PSA    Imaging:   Narrative   EXAMINATION:   CT OF THE ABDOMEN AND PELVIS WITH CONTRAST 9/13/2022 9:15 pm       TECHNIQUE:   CT of the abdomen and pelvis was performed with the administration of   intravenous contrast. Multiplanar reformatted images are provided for review.    Automated exposure control, iterative reconstruction, and/or weight based   adjustment of the mA/kV was utilized to reduce the radiation dose to as low   as reasonably achievable. COMPARISON:   Abdominal radiographs 07/20/2012       HISTORY:   ORDERING SYSTEM PROVIDED HISTORY: right flank pain   TECHNOLOGIST PROVIDED HISTORY:   Additional Contrast?->None   Reason for exam:->right flank pain   Decision Support Exception - unselect if not a suspected or confirmed   emergency medical condition->Emergency Medical Condition (MA)       FINDINGS:   Lower Chest: Lung bases are clear. Organs: Liver without focal lesion. Gallbladder unremarkable. Pancreas and   spleen unremarkable. Adrenals without nodule. Kidneys demonstrate severe   right hydronephrosis with moderate to severe right hydroureter extending in a   tortuous appearance with Philly ureteral stranding to the mid/distal ureteral   junction near the level of the aortic bifurcation where there is a   obstructing calculus measuring 9 x 7 mm. Decompressed ureter distal to this. GI/Bowel: No focal thickening or disproportion dilatation of bowel. No   inflammatory findings. Pelvis: No suspicious pelvic lesion or bulky pelvic adenopathy/free fluid. Peritoneum/Retroperitoneum: No bulky retroperitoneal adenopathy. No   suspicious peritoneal or mesenteric process       Vasculature: Grossly normal caliber of abdominal aorta and vasculature       Bones/Soft Tissues: No acute osseous or soft tissue findings. Impression   Kidneys demonstrate severe right hydronephrosis with moderate to severe right   hydroureter extending in a tortuous appearance with Phil[place] ureteral stranding   to the mid/distal ureteral junction near the level of the aortic bifurcation   where there is a obstructing calculus measuring 9 x 7 mm. Decompressed ureter   distal to this.   Findings consistent with obstructing urolithiasis           Assessment/plan:  9mm right mid ureteral calculi   Right hydronephrosis  Hx of right ureteral reconstruction as a child (13yrs old, Dr. Sanya Landers)  UTI     Creatinine stable  UA reviewed  Urine culture pending   Antibiotics per primary, on Rocephin   CTAP reviewed,9mm right ureteral calculi with hydronephrosis  She was unfortunately given diet today, will make NPO  We discussed proceeding to OR for cystoscopy, retrograde pyelogram, right stent placement today   She was made aware that she will need definitive stone management as an outpatient with her UTI. She was also made aware of possible need for PNT if we are unable to place a stent today with her surgical history as a child.   She consented to the above  Will follow          Electronically signed by RUFUS Shoemaker CNP on 9/14/2022 at 8:30 AM  [facility]     Agree with above  Seen and examined  Agree with the plan and treatment  Will plan just stent with staged procedure  Her  is present  RBA of the both procedures were discussed  She did have a low grade fever    "[employer]", DO

## 2022-09-14 NOTE — DISCHARGE INSTRUCTIONS
Your information:  Name: Pat Arnett  : 1965    Your instructions:    A ureteral stent (also know as a double J stent) was inserted during your recent procedure. Unlike a heart \"stent\" which is metal, short, and permanent, this ureteral stent plastic, and temporary. It spans from your kidney, down the ureter, and into your bladder. This will need to be removed either via a procedure in the office or a string in the next week or two. Sometime these stents are left in for long term drainage, but they need to changed every few months. The instructions will be given to you with regards to removal by Dr. Preciado/Rosendo/Kadeem    IMPORTANT - This ureteral stent will likely cause some frequency with urination, urgency with urination, back/flank pain with urination, and/or blood in the urine. This is very normal.  Taking the pain medications and/or anti-inflammatories will help to manage this discomfort if present. If you have any questions or concerns you can contact Dr. Preciado/Rosendo/Kadeem's office at [phone]. You will need definitive stone management as an outpatient via ureteroscopy with laser lithotripsy   [facility]         What to do after you leave the hospital:    Recommended diet: regular diet    Recommended activity: activity as tolerated        The following personal items were collected during your admission and were returned to you:    Belongings  Dental Appliances: Partials  Vision - Corrective Lenses: Eyeglasses  Hearing Aid: At home  Clothing:  At home  Jewelry: Watch  Body Piercings Removed: N/A  Electronic Devices: Cell Phone,   Weapons (Notify Protective Services/Security): None  Other Valuables: Sent home  Home Medications: None  Valuables Given To: Family (Comment) [place])  Provide Name(s) of Who Valuable(s) Were Given To: Rajan Man ()  Responsible person(s) in the waiting room: n/a  Patient approves for provider to speak to responsible person post operatively: Yes    Information obtained by:  By signing below, I understand that if any problems occur once I leave the hospital I am to contact PCP. I understand and acknowledge receipt of the instructions indicated above. Cystoscopy: Care Instructions  Your Care Instructions  Cystoscopy is a test. It uses a thin, lighted tube called a cystoscope to see the inside of the bladder and the urethra. The urethra is the tube that carries urine out of the body. This test is helpful because it lets your doctor see areas of your bladder and urethra that are hard to see on X-rays. It can help your doctor find bladder stones, tumors, bleeding, and infection. During this test, your doctor also can take tissue and urine samples. And if your doctor finds small stones or growths, he or she can remove them. In most cases the scope is in the bladder for less than 10 minutes. But the entire test may take 45 minutes or longer. You will probably get local anesthesia. This numbs a small part of your body. Or you may get spinal anesthesia, which numbs more of your body. Once in a while, doctors use general anesthesia. It makes you sleep during surgery. If you get a local anesthetic, you may be able to get up right after the test. But if you had spinal or general anesthesia, you will stay in the recovery room until you are able to walk or you have feeling again in your lower body. This usually takes about an hour. Your doctor may be able to tell you some of the results right after the test. But the complete results may take several days. How can you care for yourself at home? Before the test  If you are having a local anesthetic, you can eat and drink before the test.  If you are having a spinal or general anesthetic, do not eat or drink anything for at least 8 hours before the test. Tell your doctor what medicines you take.   If you are not staying overnight in the hospital, make sure you have someone who can drive you home after the test.  After the test  If your doctor prescribed antibiotics, take them as directed. Do not stop taking them just because you feel better. You need to take the full course of antibiotics. You may have some burning when you urinate for a day or two after the test. You may feel better if you drink more fluids. This may also help prevent an infection. Your urine may have a pinkish color for a few days after the test.  Current as of: June 29, 2020               Content Version: 12.8  © 2006-2021 [employer]. Care instructions adapted under license by [facility] ([facility]). If you have questions about a medical condition or this instruction, always ask your healthcare professional. [place] [address] any warranty or liability for your use of this information. Ureteral Stent Placement: What to Expect at [facility]     A ureteral (say \"you-REE-ter-ul\") stent is a thin, hollow tube that is placed in the ureter to help urine pass from the kidney into the bladder. Ureters are the tubes that connect the kidneys to the bladder. You may have a small amount of blood in your urine for 1 to 3 days after the procedure. While the stent is in place, you may have to urinate more often, feel a sudden need to urinate, or feel like you can't completely empty your bladder. You may feel some pain when you urinate or do strenuous activity. You also may notice a small amount of blood in your urine after strenuous activities. These side effects usually don't prevent people from doing their normal daily activities. You may have a thin string coming out of your urethra. Your urethra is the tube that carries urine from your bladder to outside your body. This string is attached to the stent. Try not to pull on the string. It will be used to pull out the stent when you no longer need it. After the procedure, urine may flow better from your kidneys to your bladder. A ureteral stent may be left in place for several days or for as long as several months. Your doctor will take it out when you no longer need it. Or, in some cases, it may be taken out at home. This care sheet gives you a general idea about how long it will take for you to recover. But each person recovers at a different pace. Follow the steps below to get better as quickly as possible. How can you care for yourself at home? Activity    Rest when you feel tired. Getting enough sleep will help you recover. Avoid strenuous activities, such as bicycle riding, jogging, weight lifting, or aerobic exercise, until your doctor says it is okay. Ask your doctor when you can drive again. Most people are able to return to work the day after the procedure. If your work requires intense activity, you may feel pain in your kidney area or get tired easily. If this happens, you may need to do less strenuous activities while the stent is in. Ask your doctor when it is okay for you to have sex. Diet    You can eat your normal diet. If your stomach is upset, try bland, low-fat foods like plain rice, broiled chicken, toast, and yogurt. Drink plenty of fluids (unless your doctor tells you not to). Medicines    Your doctor will tell you if and when you can restart your medicines. You will also get instructions about taking any new medicines. If you stopped taking aspirin or some other blood thinner, your doctor will tell you when to start taking it again. Be safe with medicines. Take pain medicines exactly as directed. If the doctor gave you a prescription medicine for pain, take it as prescribed. If you are not taking a prescription pain medicine, ask your doctor if you can take an over-the-counter medicine. If you think your pain medicine is making you sick to your stomach: Take your medicine after meals (unless your doctor has told you not to). Ask your doctor for a different pain medicine. If your doctor prescribed antibiotics, take them as directed. Do not stop taking them just because you feel better. You need to take the full course of antibiotics. Follow-up care is a key part of your treatment and safety. Be sure to make and go to all appointments, and call your doctor if you are having problems. It's also a good idea to know your test results and keep a list of the medicines you take. When should you call for help? Call 911 anytime you think you may need emergency care. For example, call if:    You passed out (lost consciousness). You have severe trouble breathing. You have sudden chest pain and shortness of breath, or you cough up blood. You have severe belly pain. Call your doctor now or seek immediate medical care if:    Part or all of the stent comes out of your urethra. You have pain that does not get better after you take pain medicine. You have symptoms of a urinary infection. For example: You have blood or pus in your urine. You have pain in your back just below your rib cage. This is called flank pain. You have a fever, chills, or body aches. It hurts to urinate. You have groin or belly pain. You cannot control when you urinate, or you leak urine. Watch closely for changes in your health, and be sure to contact your doctor if you have any problems. Current as of: June 16, 2022               Content Version: 13.4  © 2006-2022 [employer]. Care instructions adapted under license by [facility] ([facility]). If you have questions about a medical condition or this instruction, always ask your healthcare professional. [place] [address] any warranty or liability for your use of this information.

## 2022-09-14 NOTE — H&P
[address] [place], [place] [address]ist Group   HISTORY AND PHYSICAL EXAM      AUTHOR: Noemi Nyhan, MD PATIENT NAME: Mary Thibodeaux   DATE: 2022 MRN: [id number], : 1965   Primary Care Physician: LONNY Still     CHIEF COMPLAINT / REASON FOR ADMISSION:  Severe right sided flank pain  HPI:   This is a 61 y.o. female  has a past medical history of Arthritis, Back pain, Benign neoplasm of ovary, Blood transfusion, Carotid stenosis, Cerebral artery occlusion with cerebral infarction ([address] Fibromyalgia, History of blood transfusion, Hyperlipidemia, Lupus ([address] Pelvic peritoneal adhesions, female (postoperative) (postinfection), Right leg pain, Seizure ([address] Unspecified symptom associated with female genital organs, and Ureteral obstruction. presented with Severe right sided flank pain  for last few days prior to arrival to the hospital. Flank pain is severe (8/10), sharp, radiating to the back. Initially pain was mild, intermittent but gradually getting more persistent. Started gradually. The patient was seen and examined at bedside, appears alert and awake with no acute distress and is able to answer simple  questions. On direct questioning, patient denied any  resting ongoing chest pain, resting SOB, hemoptysis, productive cough, fever, ongoing palpitation, active abdominal pain, hematemesis, rectal bleeding, masoud, hematuria, any other  and GI complaints, and any new focal neuro deficits .     ROS:  Pertinent positives and negatives are noted in the HPI, all other systems are reviewed and negative    PMH:  Past Medical History:   Diagnosis Date    Arthritis arthritis back    and legs,     Back pain arthritis    Benign neoplasm of ovary 2012    Blood transfusion     Carotid stenosis     Cerebral artery occlusion with cerebral infarction [facility])     Fibromyalgia     History of blood transfusion     Hyperlipidemia     Lupus (HCC)     questionable    Pelvic peritoneal adhesions, female (postoperative) (postinfection) 2012    Right leg pain     Seizure ([address]     last one 30 yrs ago- h/o epilepsy as a child    Unspecified symptom associated with female genital organs 2012    Ureteral obstruction as a child had surgery to correct        Surgical History:  Past Surgical History:   Procedure Laterality Date    ABDOMINAL ADHESION SURGERY  aug 2011    laproscopy lysis of adhesions left ovarian cystotomy    BACK SURGERY      lumbar    BREAST SURGERY  lt breast lumpectomy    benign    CARDIAC SURGERY      MESH     CARPAL TUNNEL RELEASE Right 2014    CARPAL TUNNEL RELEASE Left 2016     SECTION  2 c sections    FOOT SURGERY  rt foot spur    HYSTERECTOMY (CERVIX STATUS UNKNOWN)  partial hysterectomy    JOINT REPLACEMENT Right pt had 3 knee replacements    KIDNEY SURGERY  ureteral obstruction as child    KNEE ARTHROSCOPY Left 11/05/15    medial menisectomy, chondroplasty, synovectomy, debridement, osteophytes    OTHER SURGICAL HISTORY Right 2017    hand CPC interpositional arthroplasty    OTHER SURGICAL HISTORY Left 2017    left carpal metacarpal arthroplasty    OTHER SURGICAL HISTORY  2020    Dr Treva Brock - Amplatzer 25mm PFO occluder - Lot# [id number]    SALPINGO-OOPHORECTOMY  2012    Attempted laparoscopy, open laparotomy, lysis of adhesions, and BSO       Medications Prior to Admission:    Prior to Admission medications    Medication Sig Start Date End Date Taking?  Authorizing Provider   ondansetron (ZOFRAN ODT) 4 MG disintegrating tablet Take 1 tablet by mouth every 8 hours as needed for Nausea or Vomiting 22   Modesto Brooks PA-C RA VITAMIN C 250 MG tablet take 1 tablet by mouth once daily 22   Historical Provider, MD   FEROSUL 325 (65 Fe) MG tablet take 1 tablet by mouth once daily with VITAMIN C 22   Historical Provider, MD   omeprazole (PRILOSEC) 10 MG delayed release capsule take 1 capsule by mouth once daily BEFORE A MEAL 22 murmur or friction rubs. ABDOMEN: Soft, nondistended, nontender. No hepatomegaly or splenomegaly. Has Right CVA tenderness. Bowel sound is present. EXTREMITIES: All peripheral pulses are present. No calf tenderness or swelling. No pedal edema is present. [facility] NE[facility]: Alert and awake. No new focal neuro deficit. Bilateral Pupil is equal and reactive to light. CN-ii-xii otherwise grossly intact. Motor and Sensory: Grossly Intact bilaterally with no new focal signs     LABS:  Recent Labs     09/13/22  1821   WBC 10.5   RBC 4.15   HGB 12.1   HCT 37.2   MCV 89.6   MCH 29.2   MCHC 32.5   RDW 15.3*      MPV 10.1     Recent Labs     09/13/22  1821      K 3.8      CO2 26   BUN 12   CREATININE 0.8   GLUCOSE 90   CALCIUM 10.0     No results for input(s): POCGLU in the last 72 hours.   Results for orders placed or performed during the hospital encounter of 09/13/22   CBC with Auto Differential   Result Value Ref Range    WBC 10.5 4.5 - 11.5 E9/L    RBC 4.15 3.50 - 5.50 E12/L    Hemoglobin 12.1 11.5 - 15.5 g/dL    Hematocrit 37.2 34.0 - 48.0 %    MCV 89.6 80.0 - 99.9 fL    MCH 29.2 26.0 - 35.0 pg    MCHC 32.5 32.0 - 34.5 %    RDW 15.3 (H) 11.5 - 15.0 fL    Platelets 041 790 - 694 E9/L    MPV 10.1 7.0 - 12.0 fL    Neutrophils % 80.2 (H) 43.0 - 80.0 %    Immature Granulocytes % 0.4 0.0 - 5.0 %    Lymphocytes % 12.9 (L) 20.0 - 42.0 %    Monocytes % 6.0 2.0 - 12.0 %    Eosinophils % 0.3 0.0 - 6.0 %    Basophils % 0.2 0.0 - 2.0 %    Neutrophils Absolute 8.39 (H) 1.80 - 7.30 E9/L    Immature Granulocytes # 0.04 E9/L    Lymphocytes Absolute 1.35 (L) 1.50 - 4.00 E9/L    Monocytes Absolute 0.63 0.10 - 0.95 E9/L    Eosinophils Absolute 0.03 (L) 0.05 - 0.50 E9/L    Basophils Absolute 0.02 0.00 - 0.20 E9/L   Comprehensive Metabolic Panel w/ Reflex to MG   Result Value Ref Range    Sodium 138 132 - 146 mmol/L    Potassium reflex Magnesium 3.8 3.5 - 5.0 mmol/L    Chloride 100 98 - 107 mmol/L    CO2 26 22 - 29 mmol/L emergency medical condition->Emergency Medical Condition (MA) FINDINGS: Lower Chest: Lung bases are clear. Organs: Liver without focal lesion. Gallbladder unremarkable. Pancreas and spleen unremarkable. Adrenals without nodule. Kidneys demonstrate severe right hydronephrosis with moderate to severe right hydroureter extending in a tortuous appearance with [place] ureteral stranding to the mid/distal ureteral junction near the level of the aortic bifurcation where there is a obstructing calculus measuring 9 x 7 mm. Decompressed ureter distal to this. GI/Bowel: No focal thickening or disproportion dilatation of bowel. No inflammatory findings. Pelvis: No suspicious pelvic lesion or bulky pelvic adenopathy/free fluid. Peritoneum/Retroperitoneum: No bulky retroperitoneal adenopathy. No suspicious peritoneal or mesenteric process Vasculature: Grossly normal caliber of abdominal aorta and vasculature Bones/Soft Tissues: No acute osseous or soft tissue findings. Kidneys demonstrate severe right hydronephrosis with moderate to severe right hydroureter extending in a tortuous appearance with [place] ureteral stranding to the mid/distal ureteral junction near the level of the aortic bifurcation where there is a obstructing calculus measuring 9 x 7 mm. Decompressed ureter distal to this. Findings consistent with obstructing urolithiasis. ASSESSMENT:    Present on Admission:   Ureterolithiasis   Complicated UTI (urinary tract infection)    PLAN:  # Complicated UTI with acute Rt hydronephrosis with 9x7 mm renal stone   BUN/Cr = WNL  IV Ceftriaxone    IV hydration, pain medication   Urology consult in AM  # Hypertension   Currently better controlled  Will resume home medications as needed.   Monitor vitals and adjust BP meds as needed  # Anxiety with depression   Currently stable with no acute changes   Will resume/adjust medications as on chart  # Rest of the chronic medical problems are stable and will be managed with appropriately with home medications, placed nursing communication order to verify home medications before giving them to the patient. # Diet: On PO Diet  # IVF's: No  # Fall Precaution: Yes  # Disposition: Home/primary residence   # Code Status: Full code by default  # DVT Prophylaxis : SC Heparin or SC Lovenox as on chart  The patient at bedside was counseled about clinical status, laboratory/imaging results, diagnoses, medication side effects, risk, and treatment plan, all questions were answered to patient's satisfaction and verbalized understanding      SIGNATURE: Darryn Vogt MD PATIENT NAME: [address] [place] [address] #: Hospitalist on call MRN: [id number]     Disclaimer: Portions of this note may have been generated using Dragon voice recognition software. Reasonable efforts were made to correct any dictation errors that resulted due to the programming of this software but some may still be present.

## 2022-09-14 NOTE — ED PROVIDER NOTES
ED Attending  CC: No        HPI:  22, Time: 9:02 PM EDT         Donovan Ayala is a 61 y.o. female presenting to the ED for right flank pain, beginning 3 weeks ago however significantly worsened since yesterday. The complaint has been persistent, moderate in severity, and worsened by changing position, deep breath. Patient reports her symptoms started about 3 weeks ago with dysuria, and nausea. She was given a prescription for antibiotics for \"kidney infection. \" She has since finished the antibiotics and symptoms were initially improving however, symptoms started again yesterday and were much worse therefore prompting ED evaluation today. Is also reporting nausea and vomiting as well, no diarrhea. Afebrile without recent travel or sick contacts. Patient denies all other symptoms at this time. Review of Systems:   A complete review of systems was performed and pertinent positives and negatives are stated within HPI, all other systems reviewed and are negative.          --------------------------------------------- PAST HISTORY ---------------------------------------------  Past Medical History:  has a past medical history of Arthritis, Back pain, Benign neoplasm of ovary, Blood transfusion, Carotid stenosis, Cerebral artery occlusion with cerebral infarction ([address] Fibromyalgia, History of blood transfusion, Hyperlipidemia, Lupus ([address] Pelvic peritoneal adhesions, female (postoperative) (postinfection), Right leg pain, Seizure ([address] Unspecified symptom associated with female genital organs, and Ureteral obstruction. Past Surgical History:  has a past surgical history that includes joint replacement (Right, pt had 3 knee replacements); Breast surgery (lt breast lumpectomy); Hysterectomy (partial hysterectomy); Foot surgery (rt foot spur);  section (2 c sections); Kidney surgery (ureteral obstruction as child); Abdominal adhesion surgery (aug 2011);  Salpingo-oophorectomy (2012); back surgery (2011); Carpal tunnel release (Right, 03 24 2014); Knee arthroscopy (Left, 11/05/15); Carpal tunnel release (Left, 4/22/2016); other surgical history (Right, 02/17/2017); other surgical history (Left, 09/08/2017); other surgical history (07/29/2020); and Cardiac surgery. Social History:  reports that she has never smoked. She has never used smokeless tobacco. She reports current alcohol use. She reports that she does not use drugs. Family History: family history includes Alzheimer's Disease in her mother; Breast Cancer in her maternal aunt and mother; COPD in her sister; Cancer in her father; Colon Cancer in her maternal aunt and maternal grandmother; Diabetes in her brother and father; Heart Attack in her mother; No Known Problems in her brother, brother, sister, and sister; Ovarian Cancer in her maternal cousin. The patients home medications have been reviewed. Allergies: Patient has no known allergies.     -------------------------------------------------- RESULTS -------------------------------------------------  All laboratory and radiology results have been personally reviewed by myself   LABS:  Results for orders placed or performed during the hospital encounter of 09/13/22   CBC with Auto Differential   Result Value Ref Range    WBC 10.5 4.5 - 11.5 E9/L    RBC 4.15 3.50 - 5.50 E12/L    Hemoglobin 12.1 11.5 - 15.5 g/dL    Hematocrit 37.2 34.0 - 48.0 %    MCV 89.6 80.0 - 99.9 fL    MCH 29.2 26.0 - 35.0 pg    MCHC 32.5 32.0 - 34.5 %    RDW 15.3 (H) 11.5 - 15.0 fL    Platelets 434 294 - 379 E9/L    MPV 10.1 7.0 - 12.0 fL    Neutrophils % 80.2 (H) 43.0 - 80.0 %    Immature Granulocytes % 0.4 0.0 - 5.0 %    Lymphocytes % 12.9 (L) 20.0 - 42.0 %    Monocytes % 6.0 2.0 - 12.0 %    Eosinophils % 0.3 0.0 - 6.0 %    Basophils % 0.2 0.0 - 2.0 %    Neutrophils Absolute 8.39 (H) 1.80 - 7.30 E9/L    Immature Granulocytes # 0.04 E9/L    Lymphocytes Absolute 1.35 (L) 1.50 - 4.00 E9/L    Monocytes Absolute Findings consistent with obstructing urolithiasis.             ------------------------- NURSING NOTES AND VITALS REVIEWED ---------------------------   The nursing notes within the ED encounter and vital signs as below have been reviewed. BP (!) 105/52   Pulse 78   Temp 98.7 °F (37.1 °C)   Resp 18   Wt 228 lb (103.4 kg)   LMP 06/20/2012   SpO2 96%   BMI 40.39 kg/m²   Oxygen Saturation Interpretation: Normal      ---------------------------------------------------PHYSICAL EXAM--------------------------------------      Constitutional/General: Alert and oriented x3, well appearing, non toxic in NAD  Head: Normocephalic and atraumatic  Eyes: PERRL, EOMI  Mouth: Oropharynx clear, handling secretions, no trismus  Neck: Supple, full ROM,   Pulmonary: Lungs clear to auscultation bilaterally, no wheezes, rales, or rhonchi. Not in respiratory distress  Cardiovascular:  Regular rate and rhythm, no murmurs, gallops, or rubs. 2+ distal pulses  Abdomen: Soft, non tender, non distended, right CVA tenderness to palpation  Extremities: Moves all extremities x 4. Warm and well perfused  Skin: warm and dry without rash  Neurologic: GCS 15,  Psych: Normal Affect      ------------------------------ ED COURSE/MEDICAL DECISION MAKING----------------------  Medications   cefTRIAXone (ROCEPHIN) 2,000 mg in sterile water 20 mL IV syringe (has no administration in time range)   iopamidol (ISOVUE-370) 76 % injection 75 mL (75 mLs IntraVENous Given 9/13/22 2125)   ketorolac (TORADOL) injection 30 mg (30 mg IntraVENous Given 9/14/22 0123)         ED COURSE:       Medical Decision Making:    Patient is a 61year old female presenting to the ED with right flank pain. Patient found to have 9x7mm ureterolithiasis on the right with obstructive changes. UTI also identified on urinalysis. Will plan on hospital admission for pain control and further evaluation. Counseling:    The emergency provider has spoken with the patient and spouse/SO and discussed todays results, in addition to providing specific details for the plan of care and counseling regarding the diagnosis and prognosis. Questions are answered at this time and they are agreeable with the plan.      --------------------------------- IMPRESSION AND DISPOSITION ---------------------------------    IMPRESSION  1. Ureterolithiasis    2. Urinary tract infection with hematuria, site unspecified        DISPOSITION  Disposition: Admit to med/surg floor  Patient condition is stable      NOTE: This report was transcribed using voice recognition software.  Every effort was made to ensure accuracy; however, inadvertent computerized transcription errors may be present        Carter Hatch  09/14/22 0203

## 2022-09-14 NOTE — TELEPHONE ENCOUNTER
Spoke to Lex. I asked her if she could reach out to Dr. Loleta Babinski regarding whether he wanted to reschedule Bladimir Almazan for her stress test or try to have it done in the hospital. She is currently in the OR having surgery for a kidney stone. Will wait to contact the patient until Magali Phan speaks to him.

## 2022-09-14 NOTE — BRIEF OP NOTE
Brief Postoperative Note      Patient:  Helen Snellen  YOB: 1965  MRN: [id number]    Date of Procedure: 9/14/2022    Pre-Op Diagnosis: Kidney stones [N20.0]    Post-Op Diagnosis: Same       Cysto , right stent insertion     Surgeon(s):  Amador Preciado DO    Assistant:  * No surgical staff found *    Anesthesia: Monitor Anesthesia Care    Estimated Blood Loss (mL): Minimal    Complications: None    Specimens:   * No specimens in log *    Implants:  * No implants in log *      Drains: * No LDAs found *    Findings: see operative report     Electronically signed by Amador Preciado DO on 9/14/2022 at 1:41 PM

## 2022-09-15 ENCOUNTER — TELEPHONE (OUTPATIENT)
Dept: CARDIOLOGY | Age: 57
End: 2022-09-15

## 2022-09-15 LAB
ALBUMIN SERPL-MCNC: 3.5 G/DL (ref 3.5–5.2)
ALP BLD-CCNC: 64 U/L (ref 35–104)
ALT SERPL-CCNC: 11 U/L (ref 0–32)
ANION GAP SERPL CALCULATED.3IONS-SCNC: 10 MMOL/L (ref 7–16)
ANISOCYTOSIS: ABNORMAL
AST SERPL-CCNC: 13 U/L (ref 0–31)
BASOPHILS ABSOLUTE: 0 E9/L (ref 0–0.2)
BASOPHILS RELATIVE PERCENT: 0 % (ref 0–2)
BILIRUB SERPL-MCNC: <0.2 MG/DL (ref 0–1.2)
BUN BLDV-MCNC: 16 MG/DL (ref 6–20)
CALCIUM SERPL-MCNC: 9.3 MG/DL (ref 8.6–10.2)
CHLORIDE BLD-SCNC: 107 MMOL/L (ref 98–107)
CO2: 23 MMOL/L (ref 22–29)
CREAT SERPL-MCNC: 0.6 MG/DL (ref 0.5–1)
EKG ATRIAL RATE: 60 BPM
EKG P AXIS: 39 DEGREES
EKG P-R INTERVAL: 130 MS
EKG Q-T INTERVAL: 438 MS
EKG QRS DURATION: 94 MS
EKG QTC CALCULATION (BAZETT): 438 MS
EKG R AXIS: 59 DEGREES
EKG T AXIS: 57 DEGREES
EKG VENTRICULAR RATE: 60 BPM
EOSINOPHILS ABSOLUTE: 0 E9/L (ref 0.05–0.5)
EOSINOPHILS RELATIVE PERCENT: 0 % (ref 0–6)
GFR AFRICAN AMERICAN: >60
GFR NON-AFRICAN AMERICAN: >60 ML/MIN/1.73
GLUCOSE BLD-MCNC: 147 MG/DL (ref 74–99)
HCT VFR BLD CALC: 33.3 % (ref 34–48)
HEMOGLOBIN: 10.6 G/DL (ref 11.5–15.5)
HYPOCHROMIA: ABNORMAL
LYMPHOCYTES ABSOLUTE: 0.52 E9/L (ref 1.5–4)
LYMPHOCYTES RELATIVE PERCENT: 9.6 % (ref 20–42)
MCH RBC QN AUTO: 29.1 PG (ref 26–35)
MCHC RBC AUTO-ENTMCNC: 31.8 % (ref 32–34.5)
MCV RBC AUTO: 91.5 FL (ref 80–99.9)
MONOCYTES ABSOLUTE: 0.05 E9/L (ref 0.1–0.95)
MONOCYTES RELATIVE PERCENT: 0.9 % (ref 2–12)
NEUTROPHILS ABSOLUTE: 4.68 E9/L (ref 1.8–7.3)
NEUTROPHILS RELATIVE PERCENT: 89.6 % (ref 43–80)
OVALOCYTES: ABNORMAL
PDW BLD-RTO: 15.2 FL (ref 11.5–15)
PLATELET # BLD: 241 E9/L (ref 130–450)
PMV BLD AUTO: 10.4 FL (ref 7–12)
POIKILOCYTES: ABNORMAL
POTASSIUM REFLEX MAGNESIUM: 4.2 MMOL/L (ref 3.5–5)
RBC # BLD: 3.64 E12/L (ref 3.5–5.5)
SODIUM BLD-SCNC: 140 MMOL/L (ref 132–146)
TOTAL PROTEIN: 6.9 G/DL (ref 6.4–8.3)
WBC # BLD: 5.2 E9/L (ref 4.5–11.5)

## 2022-09-15 PROCEDURE — 1200000000 HC SEMI PRIVATE

## 2022-09-15 PROCEDURE — 6370000000 HC RX 637 (ALT 250 FOR IP): Performed by: UROLOGY

## 2022-09-15 PROCEDURE — 6360000002 HC RX W HCPCS: Performed by: UROLOGY

## 2022-09-15 PROCEDURE — 80053 COMPREHEN METABOLIC PANEL: CPT

## 2022-09-15 PROCEDURE — 2580000003 HC RX 258: Performed by: UROLOGY

## 2022-09-15 PROCEDURE — 99232 SBSQ HOSP IP/OBS MODERATE 35: CPT | Performed by: INTERNAL MEDICINE

## 2022-09-15 PROCEDURE — 93005 ELECTROCARDIOGRAM TRACING: CPT | Performed by: SPECIALIST

## 2022-09-15 PROCEDURE — 36415 COLL VENOUS BLD VENIPUNCTURE: CPT

## 2022-09-15 PROCEDURE — 6370000000 HC RX 637 (ALT 250 FOR IP): Performed by: SPECIALIST

## 2022-09-15 PROCEDURE — 6370000000 HC RX 637 (ALT 250 FOR IP): Performed by: INTERNAL MEDICINE

## 2022-09-15 PROCEDURE — 6370000000 HC RX 637 (ALT 250 FOR IP): Performed by: NURSE PRACTITIONER

## 2022-09-15 PROCEDURE — 85025 COMPLETE CBC W/AUTO DIFF WBC: CPT

## 2022-09-15 RX ORDER — HYDROCODONE BITARTRATE AND ACETAMINOPHEN 5; 325 MG/1; MG/1
1 TABLET ORAL EVERY 6 HOURS PRN
Status: DISCONTINUED | OUTPATIENT
Start: 2022-09-15 | End: 2022-09-16

## 2022-09-15 RX ORDER — PHENAZOPYRIDINE HYDROCHLORIDE 100 MG/1
100 TABLET, FILM COATED ORAL 3 TIMES DAILY PRN
Status: DISCONTINUED | OUTPATIENT
Start: 2022-09-15 | End: 2022-09-17 | Stop reason: HOSPADM

## 2022-09-15 RX ORDER — CIPROFLOXACIN 500 MG/1
500 TABLET, FILM COATED ORAL EVERY 12 HOURS SCHEDULED
Status: DISCONTINUED | OUTPATIENT
Start: 2022-09-15 | End: 2022-09-17 | Stop reason: HOSPADM

## 2022-09-15 RX ADMIN — PHENAZOPYRIDINE HYDROCHLORIDE 100 MG: 100 TABLET ORAL at 22:26

## 2022-09-15 RX ADMIN — BUSPIRONE HYDROCHLORIDE 10 MG: 10 TABLET ORAL at 08:11

## 2022-09-15 RX ADMIN — SODIUM CHLORIDE: 9 INJECTION, SOLUTION INTRAVENOUS at 06:09

## 2022-09-15 RX ADMIN — BUSPIRONE HYDROCHLORIDE 10 MG: 10 TABLET ORAL at 22:26

## 2022-09-15 RX ADMIN — PANTOPRAZOLE SODIUM 40 MG: 40 TABLET, DELAYED RELEASE ORAL at 06:07

## 2022-09-15 RX ADMIN — HYDROMORPHONE HYDROCHLORIDE 0.5 MG: 1 INJECTION, SOLUTION INTRAMUSCULAR; INTRAVENOUS; SUBCUTANEOUS at 02:56

## 2022-09-15 RX ADMIN — ENOXAPARIN SODIUM 30 MG: 100 INJECTION SUBCUTANEOUS at 08:11

## 2022-09-15 RX ADMIN — HYDROCODONE BITARTRATE AND ACETAMINOPHEN 1 TABLET: 5; 325 TABLET ORAL at 18:18

## 2022-09-15 RX ADMIN — METOPROLOL SUCCINATE 25 MG: 25 TABLET, EXTENDED RELEASE ORAL at 08:11

## 2022-09-15 RX ADMIN — PHENAZOPYRIDINE HYDROCHLORIDE 100 MG: 100 TABLET ORAL at 11:12

## 2022-09-15 RX ADMIN — CIPROFLOXACIN HYDROCHLORIDE 500 MG: 500 TABLET, FILM COATED ORAL at 22:27

## 2022-09-15 RX ADMIN — CIPROFLOXACIN HYDROCHLORIDE 500 MG: 500 TABLET, FILM COATED ORAL at 11:13

## 2022-09-15 RX ADMIN — ASPIRIN 81 MG: 81 TABLET, COATED ORAL at 08:11

## 2022-09-15 RX ADMIN — SODIUM CHLORIDE: 9 INJECTION, SOLUTION INTRAVENOUS at 17:09

## 2022-09-15 RX ADMIN — ENOXAPARIN SODIUM 30 MG: 100 INJECTION SUBCUTANEOUS at 22:31

## 2022-09-15 RX ADMIN — POLYETHYLENE GLYCOL 3350 17 G: 17 POWDER, FOR SOLUTION ORAL at 15:36

## 2022-09-15 RX ADMIN — ATORVASTATIN CALCIUM 40 MG: 40 TABLET, FILM COATED ORAL at 08:11

## 2022-09-15 RX ADMIN — WATER 1000 MG: 1 INJECTION INTRAMUSCULAR; INTRAVENOUS; SUBCUTANEOUS at 02:55

## 2022-09-15 RX ADMIN — HYDROCODONE BITARTRATE AND ACETAMINOPHEN 1 TABLET: 5; 325 TABLET ORAL at 11:12

## 2022-09-15 ASSESSMENT — PAIN DESCRIPTION - LOCATION
LOCATION: FLANK;BACK
LOCATION: FLANK
LOCATION: ABDOMEN;BACK

## 2022-09-15 ASSESSMENT — PAIN DESCRIPTION - PAIN TYPE
TYPE: ACUTE PAIN
TYPE: ACUTE PAIN

## 2022-09-15 ASSESSMENT — PAIN DESCRIPTION - ORIENTATION
ORIENTATION: RIGHT

## 2022-09-15 ASSESSMENT — PAIN SCALES - GENERAL
PAINLEVEL_OUTOF10: 3
PAINLEVEL_OUTOF10: 3
PAINLEVEL_OUTOF10: 8
PAINLEVEL_OUTOF10: 7
PAINLEVEL_OUTOF10: 2
PAINLEVEL_OUTOF10: 8

## 2022-09-15 ASSESSMENT — PAIN DESCRIPTION - FREQUENCY: FREQUENCY: INTERMITTENT

## 2022-09-15 ASSESSMENT — PAIN DESCRIPTION - DESCRIPTORS
DESCRIPTORS: DISCOMFORT
DESCRIPTORS: ACHING
DESCRIPTORS: ACHING;DULL;SORE;TENDER

## 2022-09-15 NOTE — PROGRESS NOTES
9/15/2022 9:18 AM  Bassem Calderon  [id number]    Subjective:    Having some lower abdominal cramping and dysuria   No trouble urinating  No fevers  Asking for PO pain medication     Review of Systems  Constitutional: No fever or chills   Respiratory: negative for cough and hemoptysis  Cardiovascular: negative for chest pain and dyspnea  Gastrointestinal: negative for abdominal pain, diarrhea, nausea and vomiting   : See above  Derm: negative for rash and skin lesion(s)  Neurological: negative for seizures and tremors  Musculoskeletal: Negative    Psychiatric: Negative   All other reviews are negative      Scheduled Meds:   cefTRIAXone (ROCEPHIN) IV  1,000 mg IntraVENous Q24H    sodium chloride flush  10 mL IntraVENous 2 times per day    enoxaparin  30 mg SubCUTAneous BID    aspirin  81 mg Oral Daily    metoprolol succinate  25 mg Oral Daily    atorvastatin  40 mg Oral Daily    pantoprazole  40 mg Oral QAM AC    busPIRone  10 mg Oral BID       Objective:  Vitals:    09/15/22 0604   BP: (!) 100/58   Pulse: 75   Resp: 20   Temp: 98 °F (36.7 °C)   SpO2: 93%         Allergies: Patient has no known allergies. General Appearance: alert and oriented to person, place and time and in no acute distress  Skin: no rash or erythema  Head: normocephalic and atraumatic  Pulmonary/Chest: normal air movement, no respiratory distress  Abdomen: soft, non-tender, non-distended  Genitourinary: no ramos   Extremities: no cyanosis, clubbing or edema         Labs:     Recent Labs     09/15/22  0417      K 4.2      CO2 23   BUN 16   CREATININE 0.6   GLUCOSE 147*   CALCIUM 9.3       Lab Results   Component Value Date/Time    HGB 10.6 09/15/2022 04:17 AM    HCT 33.3 09/15/2022 04:17 AM       No results found for: PSA      Assessment/Plan:  POD#1 cystoscopy, right stent insertion   9mm right mid ureteral stone   Right hydronephrosis   UTI with sepsis, E. Coli bacteremia   Hx of right ureteral injury s/p repair ( [place])    Creatinine stable   Urine culture from 8/31, +E. Coli, was sent with EKTA BANEGAS  Now with E.Coli bacteremia   ID consult   Antibiotics per ID recommendations   Cont the right ureteral stent   She will need definitive stone management as an outpatient via ureteroscopy with laser lithotripsy   [facility]   No further  interventions are planned at this time      RUFSU Bullard - CNP   MASTER  Urology

## 2022-09-15 NOTE — OP NOTE
[address]                                OPERATIVE REPORT    PATIENT NAME: Darrick Pizano                  :        1965  MED REC NO:   [id number]                            ROOM:       0315  ACCOUNT NO:   [de-identified]                           ADMIT DATE: 2022  PROVIDER:     Aquiles Preciado DO    DATE OF PROCEDURE:  2022    PREOPERATIVE DIAGNOSES:  1.  A 9-mm right mid ureteral stone. 2.  Right hydronephrosis. 3.  Right flank pain. 4.  UTI with associated fever. 5.  History of ureteral injury, status post surgical correction. POSTOPERATIVE DIAGNOSES:  1.  A 9-mm right mid ureteral stone. 2.  Right hydronephrosis. 3.  Right flank pain. 4.  UTI with associated fever. 5.  History of ureteral injury, status post surgical correction. PROCEDURES PERFORMED:  The patient had:  1. Cystoscopy. 2.  Right stent insertion. ANESTHESIA:  Monitored anesthesia. SURGEON:  Venkatesh Preciado DO.    ESTIMATED BLOOD LOSS:  None. CONDITION:  To PACU, stable. Preoperative antibiotics were administered. PATHOLOGIC SPECIMEN:  Urine culture. STORY:  A 50-year-old  female, known to Dr. Ian Singleton, had presented  last night to the emergency department with complaint of right flank  pain. CT was performed, which showed a 9-mm right mid ureteral stone  with significant hydronephrosis. Please note the patient has previous  history of a ureteral injury. She had had this corrected by Dr. Ian Singleton. Her  is present. Imaging was reviewed with them. The risks, the  benefits, and the alternatives of the proposed surgical procedure were  extensively explained. They understood the risks, the benefits, and the  alternatives and elected to proceed.     DESCRIPTION OF PROCEDURE:  This pleasant 50-year-old  female  was brought to the operating room #6 at [address] [place] [address],  placed in the supine position, and induced with monitored anesthesia. Anesthesia monitored the head and neck area, IV access, and vital signs  throughout the course of the case. Status post induction, the patient  was placed in the dorsal lithotomy position. All underlying points were  pressure padded. SCDs were applied. Prepped and draped in normal  sterile fashion. I proceeded by taking a 21-Spanish Olympus scope with a  30-degree lens inserted through the meatus in an atraumatic fashion. Panendoscopic visualization of the bladder was devoid of any significant  masses, tumors, lesions, or defects. The right and left ureteral  orifices had normal position. The right ureteral orifice had some  stadium effect to it. I had difficulty getting a 0.035 Glidewire, but I  had to use an angled wire, I was able to get it into the kidney. Once  this occurred, I placed a 6 x 26 double-J stent and got some  concentrated urine to drain, but no substantial pyonephrosis. Her  bladder was drained. She awoke from anesthesia without complication and  brought to PACU in a stable condition. PLAN:  1. She will need a laser litho in about two to four weeks. 2.  Findings were conveyed to her family who is present. 3.  She tolerated the procedure.         [address], DO    D: 09/14/2022 14:03:34       T: 09/14/2022 15:06:02     [email or web address]  Job#: [id number]     Doc#: [id number]    CC:  Casey Vargas

## 2022-09-15 NOTE — CONSULTS
[address] [id number] [address]  Phone [phone]   [phone]      Admit Date: 2022  6:00 PM  Pt Name: Hung Preciado  MRN: [id number]  : 1965  Reason for Consult:    Chief Complaint   Patient presents with    Flank Pain     Right sided flank pain since yesterday, kidney infection  and finished abx last saturday, dysuria, nausea with emesis     Requesting Physician:  Douglas Etienne MD  PCP: LONNY Tabares  History Obtained From:  patient, chart   ID consulted for Ureterolithiasis [N34.8]  Complicated UTI (urinary tract infection) [N39.0]  Urinary tract infection with hematuria, site unspecified [N39.0, R31.9]  on hospital day 1  CHIEF COMPLAINT       Chief Complaint   Patient presents with    Flank Pain     Right sided flank pain since yesterday, kidney infection  and finished abx last saturday, dysuria, nausea with emesis     HISTORYOF PRESENT ILLNESS   Hung Preciado is a 61 y.o. female who presents with   has a past medical history of Arthritis, Back pain, Benign neoplasm of ovary, Blood transfusion, Carotid stenosis, Cerebral artery occlusion with cerebral infarction ([address] Fibromyalgia, History of blood transfusion, Hyperlipidemia, Lupus ([address] Pelvic peritoneal adhesions, female (postoperative) (postinfection), Right leg pain, Seizure ([address] Unspecified symptom associated with female genital organs, and Ureteral obstruction.    ED TRIAGEVITALS  BP: (!) 100/58, Temp: 98 °F (36.7 °C), Heart Rate: 75, Resp: 20, SpO2: 93 %  HPI  Pt presented to ER on 2022 with diagnosis of  Ureterolithiasis [N44.2]  Complicated UTI (urinary tract infection) [N39.0]  Urinary tract infection with hematuria, site unspecified [N39.0, R31.9]  ID was consulted on 09/15/22 for infection management complicated uti septicemia     Pt WAS in ER ON  WITH UTI D/C WITH OMNICEF CX WAS PAN(S) ECOLI    SHE COMPLEETD HER COURSE  SHE HASD INC RIGHT FLANK PAIN F/C  WBC10.5 tablet, Take 1 tablet by mouth daily  Multiple Vitamin (MULTI-VITAMIN DAILY PO), Take by mouth daily (Patient not taking: Reported on 9/14/2022)  CURRENT MEDICATIONS     Current Facility-Administered Medications:     HYDROcodone-acetaminophen (NORCO) 5-325 MG per tablet 1 tablet, 1 tablet, Oral, Q6H PRN, Keren Germain MD, 1 tablet at 09/15/22 1112    phenazopyridine (PYRIDIUM) tablet 100 mg, 100 mg, Oral, TID PRN, RUFUS Rivas - CNP, 100 mg at 09/15/22 1112    perflutren lipid microspheres (DEFINITY) injection 1.65 mg, 1.5 mL, IntraVENous, ONCE PRN, Bony Dukes MD    ciprofloxacin (CIPRO) tablet 500 mg, 500 mg, Oral, 2 times per day, Bony Dukes MD, 500 mg at 09/15/22 1113    cefTRIAXone (ROCEPHIN) 1,000 mg in sterile water 10 mL IV syringe, 1,000 mg, IntraVENous, Q24H, Venkatesh A Ozzy, DO, 1,000 mg at 09/15/22 0255    0.9 % sodium chloride infusion, , IntraVENous, Continuous, Venkatesh A Ozzy, DO, Last Rate: 100 mL/hr at 09/15/22 0609, New Bag at 09/15/22 0609    sodium chloride flush 0.9 % injection 10 mL, 10 mL, IntraVENous, 2 times per day, Venkatesh A Ozzy, DO    sodium chloride flush 0.9 % injection 10 mL, 10 mL, IntraVENous, PRN, Venkatesh A Ozzy, DO    0.9 % sodium chloride infusion, , IntraVENous, PRN, Venkatesh A Ozzy, DO    enoxaparin Sodium (LOVENOX) injection 30 mg, 30 mg, SubCUTAneous, BID, Venkatesh A Ozzy, DO, 30 mg at 09/15/22 0811    promethazine (PHENERGAN) tablet 12.5 mg, 12.5 mg, Oral, Q6H PRN **OR** ondansetron (ZOFRAN) injection 4 mg, 4 mg, IntraVENous, Q6H PRN, Venkatesh A Ozzy, DO    polyethylene glycol (GLYCOLAX) packet 17 g, 17 g, Oral, Daily PRN, Venkatesh A Ozzy, DO    acetaminophen (TYLENOL) tablet 650 mg, 650 mg, Oral, Q6H PRN, 650 mg at 09/14/22 0712 **OR** acetaminophen (TYLENOL) suppository 650 mg, 650 mg, Rectal, Q6H PRN, Venkatesh A Ozzy, DO    aspirin EC tablet 81 mg, 81 mg, Oral, Daily, Venkatesh A Ozzy, DO, 81 mg at 09/15/22 0811    metoprolol succinate (TOPROL XL) extended release tablet 25 mg, 25 mg, BLADDER SURGERY Right 2022    CYSTOSCOPY  RIGHT  STENT INSERTION performed by Leatha rPeciado DO at [facility]  lt breast lumpectomy    benign    CARDIAC SURGERY      MESH     CARPAL TUNNEL RELEASE Right 2014    CARPAL TUNNEL RELEASE Left 2016     SECTION  2 c sections    FOOT SURGERY  rt foot spur    HYSTERECTOMY (CERVIX STATUS UNKNOWN)  partial hysterectomy    JOINT REPLACEMENT Right pt had 3 knee replacements    KIDNEY SURGERY  ureteral obstruction as child    KNEE ARTHROSCOPY Left 11/05/15    medial menisectomy, chondroplasty, synovectomy, debridement, osteophytes    OTHER SURGICAL HISTORY Right 2017    hand CPC interpositional arthroplasty    OTHER SURGICAL HISTORY Left 2017    left carpal metacarpal arthroplasty    OTHER SURGICAL HISTORY  2020    Dr Emely Rondon - Amplatzer 25mm PFO occluder - Lot# [id number]    SALPINGO-OOPHORECTOMY  2012    Attempted laparoscopy, open laparotomy, lysis of adhesions, and BSO     FAMILY HISTORY       Family History   Problem Relation Age of Onset    Heart Attack Mother     Alzheimer's Disease Mother     Breast Cancer Mother     Cancer Father         skin rare form     Diabetes Father     No Known Problems Sister     No Known Problems Brother     No Known Problems Sister     COPD Sister     Diabetes Brother     No Known Problems Brother     Colon Cancer Maternal Grandmother     Breast Cancer Maternal Aunt     Colon Cancer Maternal Aunt     Ovarian Cancer Maternal Cousin      SOCIAL HISTORY       Social History     Socioeconomic History    Marital status:    Tobacco Use    Smoking status: Never    Smokeless tobacco: Never   Vaping Use    Vaping Use: Never used   Substance and Sexual Activity    Alcohol use: Yes     Comment: occ    Drug use: Never   Social History Narrative    Drinks 2 cups of coffee daily.      PHYSICAL EXAM       ED Triage Vitals   BP Temp Temp Source Heart Rate Resp SpO2 Height Weight   22 1739 09/13/22 1719 09/14/22 0510 09/13/22 1719 09/13/22 1739 09/13/22 1719 09/14/22 0445 09/13/22 1739   (!) 148/63 98.7 °F (37.1 °C) Oral 86 20 98 % 5' 3\" (1.6 m) 228 lb (103.4 kg)     Vitals:    Vitals:    09/14/22 1515 09/14/22 1730 09/14/22 2317 09/15/22 0604   BP: (!) 121/49 (!) 115/56 105/68 (!) 100/58   Pulse: 76 78 75 75   Resp: 18 16 20 20   Temp: 98.4 °F (36.9 °C) 98.3 °F (36.8 °C) 97.7 °F (36.5 °C) 98 °F (36.7 °C)   TempSrc: Oral Oral Oral Oral   SpO2: 95%  93% 93%   Weight:       Height:         Physical Exam   Constitutional/General: Alert and oriented, NAD  Head: NC/AT  Eyes: PERRL, EOMI  Mouth:  no thrush   Neck: Supple   Pulmonary: Lungs clear to auscultation bilaterally. Not in respiratory distress  Cardiovascular:  Regular rate and rhythm, no murmurs, gallops, or rubs. Abdomen: Soft, + BS.  distension. Nontender. Extremities: Moves all extremities x 4. Warm and well perfused  Pulses:  Distal pulses    Skin: Warm and dry without rash  Neurologic:    No focal deficits  Psych: Normal Affect       Peripheral Intravenous Line:  Peripheral IV 09/14/22 Right Antecubital (Active)   Site Assessment Clean, dry & intact 09/15/22 0800   Line Status Infusing 09/15/22 0800   Line Care Connections checked and tightened 09/15/22 0800   Phlebitis Assessment No symptoms 09/15/22 0800   Infiltration Assessment 0 09/15/22 0800   Alcohol Cap Used Yes 09/15/22 0800   Dressing Status Clean, dry & intact 09/15/22 0800   Dressing Type Transparent 09/15/22 0800         DIAGNOSTIC RESULTS   RADIOLOGY:   CT ABDOMEN PELVIS W IV CONTRAST Additional Contrast? None    Result Date: 9/13/2022  EXAMINATION: CT OF THE ABDOMEN AND PELVIS WITH CONTRAST 9/13/2022 9:15 pm TECHNIQUE: CT of the abdomen and pelvis was performed with the administration of intravenous contrast. Multiplanar reformatted images are provided for review.  Automated exposure control, iterative reconstruction, and/or weight based adjustment of the mA/kV was utilized to reduce the radiation dose to as low as reasonably achievable. COMPARISON: Abdominal radiographs 07/20/2012 HISTORY: ORDERING SYSTEM PROVIDED HISTORY: right flank pain TECHNOLOGIST PROVIDED HISTORY: Additional Contrast?->None Reason for exam:->right flank pain Decision Support Exception - unselect if not a suspected or confirmed emergency medical condition->Emergency Medical Condition (MA) FINDINGS: Lower Chest: Lung bases are clear. Organs: Liver without focal lesion. Gallbladder unremarkable. Pancreas and spleen unremarkable. Adrenals without nodule. Kidneys demonstrate severe right hydronephrosis with moderate to severe right hydroureter extending in a tortuous appearance with [place] ureteral stranding to the mid/distal ureteral junction near the level of the aortic bifurcation where there is a obstructing calculus measuring 9 x 7 mm. Decompressed ureter distal to this. GI/Bowel: No focal thickening or disproportion dilatation of bowel. No inflammatory findings. Pelvis: No suspicious pelvic lesion or bulky pelvic adenopathy/free fluid. Peritoneum/Retroperitoneum: No bulky retroperitoneal adenopathy. No suspicious peritoneal or mesenteric process Vasculature: Grossly normal caliber of abdominal aorta and vasculature Bones/Soft Tissues: No acute osseous or soft tissue findings. Kidneys demonstrate severe right hydronephrosis with moderate to severe right hydroureter extending in a tortuous appearance with [place] ureteral stranding to the mid/distal ureteral junction near the level of the aortic bifurcation where there is a obstructing calculus measuring 9 x 7 mm. Decompressed ureter distal to this. Findings consistent with obstructing urolithiasis. XR UROGRAM W OR WO KUB W OR WO TOMOGRAM    Result Date: 9/14/2022  EXAMINATION: SPOT FLUOROSCOPIC IMAGES 9/14/2022 12:10 pm TECHNIQUE: Fluoroscopy was provided by the radiology department for procedure. Radiologist was not present during examination. FLUOROSCOPY DOSE AND TYPE OR TIME AND EXPOSURES: Fluoro time: 11.6 seconds and 4 images COMPARISON: CT abdomen and pelvis 09/13/2022 HISTORY: Intraprocedural imaging. FINDINGS: 4 spot images of the right ureter were obtained. Final images demonstrate placement of a right ureter stent. Intraprocedural fluoroscopic spot images as above. See separate procedure report for more information. LABS  Recent Labs     09/13/22  1821 09/15/22  0417   WBC 10.5 5.2   HGB 12.1 10.6*   HCT 37.2 33.3*   MCV 89.6 91.5    241     Recent Labs     09/13/22  1821 09/15/22  0417    140   K 3.8 4.2    107   CO2 26 23   BUN 12 16   CREATININE 0.8 0.6   GFRAA >60 >60   LABGLOM >60 >60   GLUCOSE 90 147*   PROT 8.1 6.9   LABALBU 4.2 3.5   CALCIUM 10.0 9.3   BILITOT 0.5 <0.2   ALKPHOS 95 64   AST 17 13   ALT 12 11     No results for input(s): PROCAL in the last 72 hours.   Lab Results   Component Value Date    CRP 1.1 (H) 11/08/2019     Lab Results   Component Value Date    SEDRATE 20 11/08/2019     No results found for: TAOFQBT2Y8  Lab Results   Component Value Date/Time    COVID19 Not Detected 07/24/2020 11:00 AM          Lab Results   Component Value Date/Time    COVID19 Not Detected 07/24/2020 11:00 AM     COVID-19/YUSRA-COV2 LABS  Recent Labs     09/13/22  1821 09/15/22  0417   AST 17 13   ALT 12 11     Lab Results   Component Value Date/Time    CHOL 151 07/22/2020 10:28 AM    TRIG 74 07/22/2020 10:28 AM    HDL 77 07/22/2020 10:28 AM    LDLCALC 59 07/22/2020 10:28 AM    LABVLDL 15 07/22/2020 10:28 AM         MICROBIOLOGY:     Cultures :   Recent Labs     09/13/22 1821   BC Gram stain performed from blood culture bottle media  Gram negative rods  Previously positive blood culture called  *  Refer to previous culture CXBL2 9/13/2022 1826 for susceptibility  results       Recent Labs     09/13/22 1821 09/13/22 1826 09/13/22  2242   ORG Escherichia coli* Escherichia coli* Gram negative kike*     Recent Labs 09/13/22  1826   BLOODCULT2 Gram stain performed from blood culture bottle media  Gram negative rods  *  Sensitivity to follow       Lab Results   Component Value Date/Time    [facility]  09/13/2022 06:21 PM     Gram stain performed from blood culture bottle media  Gram negative rods  Previously positive blood culture called      [facility]  09/13/2022 06:21 PM     Refer to previous culture CXBL2 9/13/2022 1826 for susceptibility  results      ORG Gram negative kike 09/13/2022 10:42 PM    ORG Escherichia coli 09/13/2022 06:26 PM    ORG Escherichia coli 09/13/2022 06:21 PM     Lab Results   Component Value Date/Time    BLOODCULT2  09/13/2022 06:26 PM     Gram stain performed from blood culture bottle media  Gram negative rods      BLOODCULT2 Sensitivity to follow 09/13/2022 06:26 PM    ORG Gram negative kike 09/13/2022 10:42 PM    ORG Escherichia coli 09/13/2022 06:26 PM    ORG Escherichia coli 09/13/2022 06:21 PM       Urine Culture, Routine   Date Value Ref Range Status   09/13/2022   Preliminary    >100,000 CFU/ml  Identification and sensitivity to follow     08/31/2022 >100,000 CFU/ml  Final     No results found for: [address] [place] [address]  No results for input(s): CDIFFTOXANT in the last 72 hours. FINAL IMPRESSION    Patient is a 61 y.o. female who presented with   Chief Complaint   Patient presents with    Flank Pain     Right sided flank pain since yesterday, kidney infection 8/31 and finished abx last saturday, dysuria, nausea with emesis    and admitted for Ureterolithiasis [N64.9]  Complicated UTI (urinary tract infection) [N39.0]  Urinary tract infection with hematuria, site unspecified [N39.0, R31.9]  E coli sepsis due to complicated uti/pyelonephritis/right hydronephrosis obstructing stone  S/p 1. Cystoscopy. 2.  Right stent insertion.   Failed 10 DAY oral omnicef  H/o PFO f/u Echo     Urine cx E COLI 8/31  pand (s)  Await finaL blood cx sensi     cefTRIAXone (ROCEPHIN) 1,000 mg in sterile water 10 mL IV syringe, Q24H    ADD CIPRO CHECK EKG     Available labs, imaging studies, microbiologic studies have been reviewed. The patient/FAMILY  was educated about the diagnosis, prognosis, indications, risks and benefits of treatment. An opportunity to ask questions was given to the patient/FAMILY and questions were answered. Thank you for involving me in the care of Tyree Barrett. Please do not hesitate to call for any questions or concerns.          Electronically signed by Michelle Mays MD on 9/15/2022 at 11:56 AM

## 2022-09-15 NOTE — PROGRESS NOTES
Admitting Date and Time: 9/13/2022  6:00 PM  Admit Dx: Ureterolithiasis [T61.4]  Complicated UTI (urinary tract infection) [N39.0]  Urinary tract infection with hematuria, site unspecified [N39.0, R31.9]    Subjective:  No complaints no issues questions answered treatment plan as described below described    ROS: denies fever, chills, cp, sob, n/v, HA unless stated above.      cefTRIAXone (ROCEPHIN) IV  1,000 mg IntraVENous Q24H    sodium chloride flush  10 mL IntraVENous 2 times per day    enoxaparin  30 mg SubCUTAneous BID    aspirin  81 mg Oral Daily    metoprolol succinate  25 mg Oral Daily    atorvastatin  40 mg Oral Daily    pantoprazole  40 mg Oral QAM AC    busPIRone  10 mg Oral BID     HYDROmorphone, 0.5 mg, Q4H PRN  sodium chloride flush, 10 mL, PRN  sodium chloride, , PRN  promethazine, 12.5 mg, Q6H PRN   Or  ondansetron, 4 mg, Q6H PRN  polyethylene glycol, 17 g, Daily PRN  acetaminophen, 650 mg, Q6H PRN   Or  acetaminophen, 650 mg, Q6H PRN         Objective:    BP (!) 100/58   Pulse 75   Temp 98 °F (36.7 °C) (Oral)   Resp 20   Ht 5' 3\" (1.6 m)   Wt 226 lb 1.6 oz (102.6 kg)   LMP 06/20/2012   SpO2 93%   BMI 40.05 kg/m²   General Appearance: alert and oriented to person, place and time and in no acute distress  Skin: warm and dry  Head: normocephalic and atraumatic  Eyes: pupils equal, round, and reactive to light, extraocular eye movements intact, conjunctivae normal  Neck: neck supple and non tender without mass   Pulmonary/Chest: clear to auscultation bilaterally- no wheezes, rales or rhonchi, normal air movement, no respiratory distress  Cardiovascular: normal rate, normal S1 and S2 and no carotid bruits  Abdomen: soft, non-tender, non-distended, normal bowel sounds, no masses or organomegaly  Extremities: no cyanosis, no clubbing and no  edema  Neurologic: no cranial nerve deficit and speech normal      Recent Labs     09/13/22  1821 09/15/22  0417    140   K 3.8 4.2    107   CO2 26 23   BUN 12 16   CREATININE 0.8 0.6   GLUCOSE 90 147*   CALCIUM 10.0 9.3       Recent Labs     09/13/22  1821 09/15/22  0417   ALKPHOS 95 64   PROT 8.1 6.9   LABALBU 4.2 3.5   BILITOT 0.5 <0.2   AST 17 13   ALT 12 11       Recent Labs     09/13/22  1821 09/15/22  0417   WBC 10.5 5.2   RBC 4.15 3.64   HGB 12.1 10.6*   HCT 37.2 33.3*   MCV 89.6 91.5   MCH 29.2 29.1   MCHC 32.5 31.8*   RDW 15.3* 15.2*    241   MPV 10.1 10.4           Radiology:   XR UROGRAM W OR WO KUB W OR WO TOMOGRAM   Final Result   Intraprocedural fluoroscopic spot images as above. See separate procedure   report for more information. CT ABDOMEN PELVIS W IV CONTRAST Additional Contrast? None   Final Result   Kidneys demonstrate severe right hydronephrosis with moderate to severe right   hydroureter extending in a tortuous appearance with [place] ureteral stranding   to the mid/distal ureteral junction near the level of the aortic bifurcation   where there is a obstructing calculus measuring 9 x 7 mm. Decompressed ureter   distal to this. Findings consistent with obstructing urolithiasis. Assessment:  Principal Problem:    Ureterolithiasis  Active Problems:    Complicated UTI (urinary tract infection)  Resolved Problems:    * No resolved hospital problems. *      Plan: History of present illness from History and Physical:  This is a 61 y.o. female  has a past medical history of Arthritis, Back pain, Benign neoplasm of ovary, Blood transfusion, Carotid stenosis, Cerebral artery occlusion with cerebral infarction ([address] Fibromyalgia, History of blood transfusion, Hyperlipidemia, Lupus ([address] Pelvic peritoneal adhesions, female (postoperative) (postinfection), Right leg pain, Seizure ([address] Unspecified symptom associated with female genital organs, and Ureteral obstruction.  presented with Severe right sided flank pain  for last few days prior to arrival to the hospital. Flank pain is severe (8/10), sharp, radiating to the back. Initially pain was mild, intermittent but gradually getting more persistent. Started gradually. The patient was seen and examined at bedside, appears alert and awake with no acute distress and is able to answer simple  questions. On direct questioning, patient denied any  resting ongoing chest pain, resting SOB, hemoptysis, productive cough, fever, ongoing palpitation, active abdominal pain, hematemesis, rectal bleeding, masoud, hematuria, any other  and GI complaints, and any new focal neuro deficits . Complicated UTI with acute Rt hydronephrosis with 9x7 mm renal stone which has led to bacteremia. PCR of Blood shows E coli and Enterobacter, sn pending. Meanwhile continue  Empiric rocephin. Continue Urology did Cystoscopy with Right stent insertion on 9/14  When she was a child she did have some type of procedure for ureteral obstruction  On 9/15 changing IV narcotics to p.o. [place]  2. Hypertension monitor on home medications. 3. Anxiety with depression  4. No change to outpatient treatment regimen for the existing stable combordities including:  Chronic back pain arthritis carotid stenosis cerebral infarction fibromyalgia hyperlipidemia seizure disorder. It seems that in the past she was in the midst of a work-up for lupus with questionable results     5. DVT Prophylaxis :  Lovenox   Full code   Disposition Home      NOTE: This report was transcribed using voice recognition software. Every effort was made to ensure accuracy; however, inadvertent computerized transcription errors may be present.      Electronically signed by Abdulkadir King MD on 9/15/2022 at 8:47 AM

## 2022-09-15 NOTE — PROGRESS NOTES
Physician Progress Note      Jerad Diamond  CSN #:                  [id number]  :                       1965  ADMIT DATE:       2022 6:00 PM  [address] [place] DATE:  RESPONDING  PROVIDER #:        Caridad Miranda MD          QUERY TEXT:    Dear Attending Provider,    Pt admitted with Complicated UTI with acute Rt hydronephrosis with 9x7 mm   renal stone. Noted documentation of E coli sepsis  on 9/15 consult note by   ordered ID consultant. If possible, please document in progress notes and   discharge summary:    The medical record reflects the following:  Risk Factors: obesity, hx as child Ureteral obstruction, HTN, HLD  Clinical Indicators: per ID consult note: \"E coli sepsis due to complicated   uti/pyelonephritis/right hydronephrosis obstructing stone\"; blood cx: + E   Coli; urine cx: >100,000 CFU/ml gm neg kike; TPR: 99.7-97.7/95-70/20-16  Treatment: Urology and ID consult, sx intervention with stent insertion, blood   and urine cx's, IVF's, IV Rocephin    Thank You,  Christian Judge RN, BSN, CDS  Clinical Documentation Improvement Specialist  [phone]  Options provided:  -- E coli sepsis  confirmed present on admission  -- E coli sepsis ruled out  -- Other - I will add my own diagnosis  -- Disagree - Not applicable / Not valid  -- Disagree - Clinically unable to determine / Unknown  -- Refer to Clinical Documentation Reviewer    PROVIDER RESPONSE TEXT:    The diagnosis of E coli sepsis was confirmed as present on admission. Query created by: Franny Ho on 9/15/2022 2:23 PM      QUERY TEXT:    Dear Attending Provider,    Patient admitted with BMI 40.05. If possible, please document in progress   notes and discharge summary if you are evaluating and /or treating any of the   following:     Thank Feliberto Gitelman RN, BSN, CDS  Clinical Documentation Improvement Specialist  [phone]  Options provided:  -- Obesity  -- Morbid obesity  -- Overweight  -- Other - I will add my own diagnosis  -- Disagree - Not applicable / Not valid  -- Disagree - Clinically unable to determine / Unknown  -- Refer to Clinical Documentation Reviewer    PROVIDER RESPONSE TEXT:    This patient has obesity. Query created by: Steven Marrero on 9/15/2022 2:24 PM      Electronically signed by:   Khoa Forrester MD 9/15/2022 2:31 PM

## 2022-09-16 ENCOUNTER — HOSPITAL ENCOUNTER (OUTPATIENT)
Dept: CARDIOLOGY | Age: 57
Discharge: HOME | End: 2022-09-16

## 2022-09-16 LAB
LV EF: 65 %
LVEF MODALITY: NORMAL
ORGANISM: ABNORMAL
ORGANISM: ABNORMAL
URINE CULTURE, ROUTINE: ABNORMAL

## 2022-09-16 PROCEDURE — 1200000000 HC SEMI PRIVATE

## 2022-09-16 PROCEDURE — 93306 TTE W/DOPPLER COMPLETE: CPT

## 2022-09-16 PROCEDURE — 6370000000 HC RX 637 (ALT 250 FOR IP): Performed by: SPECIALIST

## 2022-09-16 PROCEDURE — 2580000003 HC RX 258: Performed by: UROLOGY

## 2022-09-16 PROCEDURE — 99239 HOSP IP/OBS DSCHRG MGMT >30: CPT | Performed by: INTERNAL MEDICINE

## 2022-09-16 PROCEDURE — 6370000000 HC RX 637 (ALT 250 FOR IP): Performed by: INTERNAL MEDICINE

## 2022-09-16 PROCEDURE — 6370000000 HC RX 637 (ALT 250 FOR IP): Performed by: UROLOGY

## 2022-09-16 PROCEDURE — 6360000002 HC RX W HCPCS: Performed by: UROLOGY

## 2022-09-16 RX ORDER — HYDROCODONE BITARTRATE AND ACETAMINOPHEN 5; 325 MG/1; MG/1
2 TABLET ORAL EVERY 6 HOURS PRN
Qty: 30 TABLET | Refills: 0 | Status: SHIPPED | OUTPATIENT
Start: 2022-09-16 | End: 2022-09-21

## 2022-09-16 RX ORDER — HYDROCODONE BITARTRATE AND ACETAMINOPHEN 5; 325 MG/1; MG/1
1 TABLET ORAL ONCE
Status: COMPLETED | OUTPATIENT
Start: 2022-09-16 | End: 2022-09-16

## 2022-09-16 RX ORDER — CIPROFLOXACIN 500 MG/1
500 TABLET, FILM COATED ORAL EVERY 12 HOURS SCHEDULED
Qty: 14 TABLET | Refills: 0 | Status: SHIPPED | OUTPATIENT
Start: 2022-09-16 | End: 2022-09-23

## 2022-09-16 RX ORDER — ATROPA BELLADONNA AND OPIUM 16.2; 6 MG/1; MG/1
60 SUPPOSITORY RECTAL EVERY 8 HOURS PRN
Status: DISCONTINUED | OUTPATIENT
Start: 2022-09-16 | End: 2022-09-17 | Stop reason: HOSPADM

## 2022-09-16 RX ORDER — HYDROCODONE BITARTRATE AND ACETAMINOPHEN 5; 325 MG/1; MG/1
2 TABLET ORAL EVERY 6 HOURS PRN
Status: DISCONTINUED | OUTPATIENT
Start: 2022-09-16 | End: 2022-09-17 | Stop reason: HOSPADM

## 2022-09-16 RX ADMIN — HYDROCODONE BITARTRATE AND ACETAMINOPHEN 1 TABLET: 5; 325 TABLET ORAL at 08:32

## 2022-09-16 RX ADMIN — CIPROFLOXACIN HYDROCHLORIDE 500 MG: 500 TABLET, FILM COATED ORAL at 08:32

## 2022-09-16 RX ADMIN — HYDROCODONE BITARTRATE AND ACETAMINOPHEN 1 TABLET: 5; 325 TABLET ORAL at 10:47

## 2022-09-16 RX ADMIN — HYDROCODONE BITARTRATE AND ACETAMINOPHEN 1 TABLET: 5; 325 TABLET ORAL at 01:59

## 2022-09-16 RX ADMIN — ASPIRIN 81 MG: 81 TABLET, COATED ORAL at 08:32

## 2022-09-16 RX ADMIN — METOPROLOL SUCCINATE 25 MG: 25 TABLET, EXTENDED RELEASE ORAL at 08:31

## 2022-09-16 RX ADMIN — PANTOPRAZOLE SODIUM 40 MG: 40 TABLET, DELAYED RELEASE ORAL at 06:44

## 2022-09-16 RX ADMIN — HYDROCODONE BITARTRATE AND ACETAMINOPHEN 2 TABLET: 5; 325 TABLET ORAL at 20:37

## 2022-09-16 RX ADMIN — WATER 1000 MG: 1 INJECTION INTRAMUSCULAR; INTRAVENOUS; SUBCUTANEOUS at 02:01

## 2022-09-16 RX ADMIN — BUSPIRONE HYDROCHLORIDE 10 MG: 10 TABLET ORAL at 20:37

## 2022-09-16 RX ADMIN — BUSPIRONE HYDROCHLORIDE 10 MG: 10 TABLET ORAL at 08:31

## 2022-09-16 RX ADMIN — ATORVASTATIN CALCIUM 40 MG: 40 TABLET, FILM COATED ORAL at 08:32

## 2022-09-16 RX ADMIN — ACETAMINOPHEN 650 MG: 325 TABLET ORAL at 21:56

## 2022-09-16 RX ADMIN — CIPROFLOXACIN HYDROCHLORIDE 500 MG: 500 TABLET, FILM COATED ORAL at 20:37

## 2022-09-16 RX ADMIN — ENOXAPARIN SODIUM 30 MG: 100 INJECTION SUBCUTANEOUS at 08:31

## 2022-09-16 RX ADMIN — SODIUM CHLORIDE: 9 INJECTION, SOLUTION INTRAVENOUS at 06:45

## 2022-09-16 RX ADMIN — ENOXAPARIN SODIUM 30 MG: 100 INJECTION SUBCUTANEOUS at 20:36

## 2022-09-16 RX ADMIN — SODIUM CHLORIDE: 9 INJECTION, SOLUTION INTRAVENOUS at 14:10

## 2022-09-16 ASSESSMENT — PAIN - FUNCTIONAL ASSESSMENT
PAIN_FUNCTIONAL_ASSESSMENT: ACTIVITIES ARE NOT PREVENTED
PAIN_FUNCTIONAL_ASSESSMENT: ACTIVITIES ARE NOT PREVENTED

## 2022-09-16 ASSESSMENT — PAIN DESCRIPTION - DESCRIPTORS
DESCRIPTORS: ACHING;TENDER;SHARP
DESCRIPTORS: CRAMPING;TENDER;SHARP

## 2022-09-16 ASSESSMENT — PAIN DESCRIPTION - ORIENTATION
ORIENTATION: RIGHT
ORIENTATION: RIGHT

## 2022-09-16 ASSESSMENT — PAIN SCALES - GENERAL
PAINLEVEL_OUTOF10: 3
PAINLEVEL_OUTOF10: 3
PAINLEVEL_OUTOF10: 9
PAINLEVEL_OUTOF10: 7
PAINLEVEL_OUTOF10: 8

## 2022-09-16 ASSESSMENT — PAIN DESCRIPTION - LOCATION
LOCATION: FLANK
LOCATION: FLANK

## 2022-09-16 NOTE — PROGRESS NOTES
9/16/2022 10:34 AM  Hung Preciado  [id number]    Subjective:    No fevers   Still with right flank pain  We talked about stent pain symptoms   Tolerating diet     Review of Systems  Constitutional: No fever or chills   Respiratory: negative for cough and hemoptysis  Cardiovascular: negative for chest pain and dyspnea  Gastrointestinal: negative for abdominal pain, diarrhea, nausea and vomiting   : See above  Derm: negative for rash and skin lesion(s)  Neurological: negative for seizures and tremors  Musculoskeletal: Negative    Psychiatric: Negative   All other reviews are negative      Scheduled Meds:   HYDROcodone 5 mg - acetaminophen  1 tablet Oral Once    ciprofloxacin  500 mg Oral 2 times per day    sodium chloride flush  10 mL IntraVENous 2 times per day    enoxaparin  30 mg SubCUTAneous BID    aspirin  81 mg Oral Daily    metoprolol succinate  25 mg Oral Daily    atorvastatin  40 mg Oral Daily    pantoprazole  40 mg Oral QAM AC    busPIRone  10 mg Oral BID       Objective:  Vitals:    09/16/22 0630   BP: 122/68   Pulse: 60   Resp: 17   Temp: 97.6 °F (36.4 °C)   SpO2: 95%         Allergies: Patient has no known allergies. General Appearance: alert and oriented to person, place and time and in no acute distress  Skin: no rash or erythema  Head: normocephalic and atraumatic  Pulmonary/Chest: normal air movement, no respiratory distress  Abdomen: soft, non-tender, non-distended  Genitourinary: no ramos   Extremities: no cyanosis, clubbing or edema         Labs:     Recent Labs     09/15/22  0417      K 4.2      CO2 23   BUN 16   CREATININE 0.6   GLUCOSE 147*   CALCIUM 9.3       Lab Results   Component Value Date/Time    HGB 10.6 09/15/2022 04:17 AM    HCT 33.3 09/15/2022 04:17 AM       No results found for: PSA      Assessment/Plan:  POD#2 cystoscopy, right stent insertion   9mm right mid ureteral stone   Right hydronephrosis   UTI with sepsis, E. Coli bacteremia   Hx of right ureteral injury s/p repair (Dr. Attila Gonzalez)     Creatinine stable   Antibiotics per ID   B&O suppositories PRN bladder spasms   Cont the right ureteral stent   She will need definitive stone management as an outpatient via ureteroscopy with laser lithotripsy   This is scheduled for 9/28 at [facility]   No further  interventions are planned at this time  Please call with questions or concerns     Elizabeth Almeida, APRN - CNP   MASTER  Urology

## 2022-09-16 NOTE — DISCHARGE SUMMARY
See progress note from today. In the shift I was notified that both consults have since seen the patient and have each done their part to prepare the patient for discharge including ID clearing her for discharge home. Antibiotic was already reconciled. Surgery has suggested follow-up on the 28th. So I have prepared her discharge including her prescription signed for Norco 30 tablets dispensed. For hospital course and specific handling of each medical problem please see progress note from this day.   Since writing that note nothing else has clinically changed

## 2022-09-16 NOTE — PROGRESS NOTES
NAME: Humera Vegas  MR:  [id number]  :   1965  DATE OF SERVICE:22    This is a face to face encounter with Humera Vegas 61 y.o. female on 22  Elements of this note, including Diagnosis,  Interval History, Past Medical/Surgical/Family/Social Histories, ROS, physical exam, and Assessment and Plan were copied and pasted from Previous. Updates have been made where noted and reflect current exam and medical decision making from the DOS of this encounter. CHIEF COMPLAINT     ID following for   Chief Complaint   Patient presents with    Flank Pain     Right sided flank pain since yesterday, kidney infection  and finished abx last saturday, dysuria, nausea with emesis     HISTORY OF PRESENT ILLNESS     Pt seen and examined  22    In bed c/o right sided pain   No issues with atbx  Patient is tolerating medications. No reported adverse drug reactions. REVIEW OF SYSTEMS     As stated above in the chief complaint, otherwise negative.   CURRENT MEDICATIONS     Current Facility-Administered Medications:     HYDROcodone-acetaminophen (NORCO) 5-325 MG per tablet 2 tablet, 2 tablet, Oral, Q6H PRN, Dillan Mike MD    opium-belladonna (B&O SUPPRETTES) 16.2-60 MG suppository 60 mg, 60 mg, Rectal, Q8H PRN, RUFUS Rivas - CNP    phenazopyridine (PYRIDIUM) tablet 100 mg, 100 mg, Oral, TID PRN, RUFUS Rivas CNP, 100 mg at 09/15/22 2226    perflutren lipid microspheres (DEFINITY) injection 1.65 mg, 1.5 mL, IntraVENous, ONCE PRN, Brian Wan MD    ciprofloxacin (CIPRO) tablet 500 mg, 500 mg, Oral, 2 times per day, Brian Wan MD, 500 mg at 22 0832    0.9 % sodium chloride infusion, , IntraVENous, Continuous, Venkatesh A Ozzy, DO, Last Rate: 100 mL/hr at 22 0645, New Bag at 22 0645    sodium chloride flush 0.9 % injection 10 mL, 10 mL, IntraVENous, 2 times per day, Venkatesh A Ozzy, DO    sodium chloride flush 0.9 % injection 10 mL, 10 mL, IntraVENous, PRN, Venkatesh A Ozzy, DO    0.9 % sodium chloride infusion, , IntraVENous, PRN, Venkatesh A Ozzy, DO    enoxaparin Sodium (LOVENOX) injection 30 mg, 30 mg, SubCUTAneous, BID, Venkatesh A Ozzy, DO, 30 mg at 22 0831    promethazine (PHENERGAN) tablet 12.5 mg, 12.5 mg, Oral, Q6H PRN **OR** ondansetron (ZOFRAN) injection 4 mg, 4 mg, IntraVENous, Q6H PRN, Venkatesh A Ozzy, DO    polyethylene glycol (GLYCOLAX) packet 17 g, 17 g, Oral, Daily PRN, Venkatesh A Ozzy, DO, 17 g at 09/15/22 1536    acetaminophen (TYLENOL) tablet 650 mg, 650 mg, Oral, Q6H PRN, 650 mg at 22 0712 **OR** acetaminophen (TYLENOL) suppository 650 mg, 650 mg, Rectal, Q6H PRN, Venkatesh A Ozzy, DO    aspirin EC tablet 81 mg, 81 mg, Oral, Daily, Venkatesh A Ozzy, DO, 81 mg at 22 5450    metoprolol succinate (TOPROL XL) extended release tablet 25 mg, 25 mg, Oral, Daily, Venkatesh A Ozzy, DO, 25 mg at 22 0831    atorvastatin (LIPITOR) tablet 40 mg, 40 mg, Oral, Daily, Venkatesh A Ozzy, DO, 40 mg at 22 0832    pantoprazole (PROTONIX) tablet 40 mg, 40 mg, Oral, QAM AC, Venkatesh A Ozzy, DO, 40 mg at 22 0644    busPIRone (BUSPAR) tablet 10 mg, 10 mg, Oral, BID, Venkatesh A Ozzy, DO, 10 mg at 22 0831  PHYSICAL EXAM     /68   Pulse 60   Temp 97.6 °F (36.4 °C) (Oral)   Resp 16   Ht 5' 3\" (1.6 m)   Wt 226 lb 1.6 oz (102.6 kg)   LMP 2012   SpO2 95%   BMI 40.05 kg/m²   Temp  Av.8 °F (36.6 °C)  Min: 97.6 °F (36.4 °C)  Max: 98 °F (36.7 °C)  Constitutional:  The patient is awake, alert, and oriented. Skin:    Warm and dry. No rashes were noted. HEENT:     AT/NC     Chest:   No use of accessory muscles to breathe. Symmetrical expansion. Cardiovascular:  S1 and S2 are rhythmic and regular. No murmurs appreciated. Abdomen:   Positive bowel sounds to auscultation. Benign to palpation. Extremities:   No clubbing, no cyanosis, no edema.   CNS    AAxO         Peripheral Intravenous Line:  Peripheral IV 22 Right Antecubital (Active)   Site Assessment Clean, dry & intact 09/16/22 0407   Line Status Infusing 09/16/22 0407   Line Care Connections checked and tightened 09/16/22 0407   Phlebitis Assessment No symptoms 09/16/22 0407   Infiltration Assessment 0 09/16/22 0407   Alcohol Cap Used Yes 09/16/22 0407   Dressing Status Clean, dry & intact 09/16/22 0407   Dressing Type Transparent 09/16/22 0407            DIAGNOSTIC RESULTS   Radiology:    Recent Labs     09/13/22  1821 09/15/22  0417   WBC 10.5 5.2   RBC 4.15 3.64   HGB 12.1 10.6*   HCT 37.2 33.3*   MCV 89.6 91.5   MCH 29.2 29.1   MCHC 32.5 31.8*   RDW 15.3* 15.2*    241   MPV 10.1 10.4     Recent Labs     09/13/22  1821 09/15/22  0417    140   K 3.8 4.2    107   CO2 26 23   BUN 12 16   CREATININE 0.8 0.6   GLUCOSE 90 147*   PROT 8.1 6.9   LABALBU 4.2 3.5   CALCIUM 10.0 9.3   BILITOT 0.5 <0.2   ALKPHOS 95 64   AST 17 13   ALT 12 11     Lab Results   Component Value Date    CRP 1.1 (H) 11/08/2019     Lab Results   Component Value Date    SEDRATE 20 11/08/2019     Recent Labs     09/13/22  1821 09/15/22  0417   AST 17 13   ALT 12 11     Lab Results   Component Value Date/Time    CHOL 151 07/22/2020 10:28 AM    TRIG 74 07/22/2020 10:28 AM    HDL 77 07/22/2020 10:28 AM    LDLCALC 59 07/22/2020 10:28 AM    LABVLDL 15 07/22/2020 10:28 AM     No results found for: VITD25    Microbiology:   No results for input(s): COVID19 in the last 72 hours.   Lab Results   Component Value Date/Time    [facility]  09/13/2022 06:21 PM     Gram stain performed from blood culture bottle media  Gram negative rods  Previously positive blood culture called      [facility]  09/13/2022 06:21 PM     Refer to previous culture CXBL2 9/13/2022 1826 for susceptibility  results      ORG Escherichia coli 09/13/2022 10:42 PM    ORG Escherichia coli 09/13/2022 06:26 PM    ORG Escherichia coli 09/13/2022 06:21 PM        Recent Labs     09/13/22 2242   ORG Escherichia coli*     Recent Labs     09/13/22  1821 09/13/22  1826 09/13/22 2242   ORG Escherichia coli* Escherichia coli* Escherichia coli*        FINAL IMPRESSION    Pt had   Chief Complaint   Patient presents with    Flank Pain     Right sided flank pain since yesterday, kidney infection 8/31 and finished abx last saturday, dysuria, nausea with emesis    Admitted for Ureterolithiasis [D58.6]  Complicated UTI (urinary tract infection) [N39.0]  Urinary tract infection with hematuria, site unspecified [N39.0, R31.9]     E coli sepsis due to complicated uti/pyelonephritis/right hydronephrosis obstructing stone  S/p 1. Cystoscopy. 2.  Right stent insertion. Failed 10 DAY oral omnicef  H/o PFO f/u Echo      Urine cx E COLI 9/13, 8/31  pand (s)  blood cx  E coli pan (S)     Stop cefTRIAXone      Cont CIPRO for 2 weeks   EKG  qtc wnl     Can d.c  Can f/u 2-3 weeks  Pt med rec      To follow ordered labs, cultures and imaging. Imaging and labs were reviewed per medical records. The patient was educated about the diagnosis, prognosis, indications, risks and benefits of treatment. An opportunity to ask questions was given to the patient/FAMILY. Thank you for involving me in the care of Humera Vegas I will continue to follow. Please do not hesitate to call for any questions or concerns.     Electronically signed by Brian Wan MD on 9/16/2022 at 12:32 PM

## 2022-09-16 NOTE — CARE COORDINATION
9-16-Cm note: ( no covid testing) Pt and  ambulating in [place], she denies any needs for home .  Electronically signed by Chris Harding RN on 9/16/2022 at 4:48 PM

## 2022-09-16 NOTE — PLAN OF CARE
Problem: Discharge Planning  Goal: Discharge to home or other facility with appropriate resources  Outcome: Progressing     Problem: Safety - Adult  Goal: Free from fall injury  Outcome: Progressing     Problem: Pain  Goal: Verbalizes/displays adequate comfort level or baseline comfort level  Outcome: Progressing     Problem: ABCDS Injury Assessment  Goal: Absence of physical injury  Outcome: Progressing     Problem: Chronic Conditions and Co-morbidities  Goal: Patient's chronic conditions and co-morbidity symptoms are monitored and maintained or improved  Outcome: Progressing     Problem: Genitourinary - Adult  Goal: Absence of urinary retention  Outcome: Progressing

## 2022-09-16 NOTE — PROGRESS NOTES
normal      Recent Labs     09/13/22  1821 09/15/22  0417    140   K 3.8 4.2    107   CO2 26 23   BUN 12 16   CREATININE 0.8 0.6   GLUCOSE 90 147*   CALCIUM 10.0 9.3         Recent Labs     09/13/22  1821 09/15/22  0417   ALKPHOS 95 64   PROT 8.1 6.9   LABALBU 4.2 3.5   BILITOT 0.5 <0.2   AST 17 13   ALT 12 11         Recent Labs     09/13/22  1821 09/15/22  0417   WBC 10.5 5.2   RBC 4.15 3.64   HGB 12.1 10.6*   HCT 37.2 33.3*   MCV 89.6 91.5   MCH 29.2 29.1   MCHC 32.5 31.8*   RDW 15.3* 15.2*    241   MPV 10.1 10.4             Radiology:   XR UROGRAM W OR WO KUB W OR WO TOMOGRAM   Final Result   Intraprocedural fluoroscopic spot images as above. See separate procedure   report for more information. CT ABDOMEN PELVIS W IV CONTRAST Additional Contrast? None   Final Result   Kidneys demonstrate severe right hydronephrosis with moderate to severe right   hydroureter extending in a tortuous appearance with Phil[place] ureteral stranding   to the mid/distal ureteral junction near the level of the aortic bifurcation   where there is a obstructing calculus measuring 9 x 7 mm. Decompressed ureter   distal to this. Findings consistent with obstructing urolithiasis. Assessment:  Principal Problem:    Ureterolithiasis  Active Problems:    Complicated UTI (urinary tract infection)  Resolved Problems:    * No resolved hospital problems. *      Plan: History of present illness from History and Physical:  This is a 61 y.o. female  has a past medical history of Arthritis, Back pain, Benign neoplasm of ovary, Blood transfusion, Carotid stenosis, Cerebral artery occlusion with cerebral infarction ([address] Fibromyalgia, History of blood transfusion, Hyperlipidemia, Lupus ([address] Pelvic peritoneal adhesions, female (postoperative) (postinfection), Right leg pain, Seizure ([address] Unspecified symptom associated with female genital organs, and Ureteral obstruction.  presented with Severe right sided flank pain for last few days prior to arrival to the hospital. Flank pain is severe (8/10), sharp, radiating to the back. Initially pain was mild, intermittent but gradually getting more persistent. Started gradually. The patient was seen and examined at bedside, appears alert and awake with no acute distress and is able to answer simple  questions. On direct questioning, patient denied any  resting ongoing chest pain, resting SOB, hemoptysis, productive cough, fever, ongoing palpitation, active abdominal pain, hematemesis, rectal bleeding, masoud, hematuria, any other  and GI complaints, and any new focal neuro deficits . Complicated UTI with acute Rt hydronephrosis with 9x7 mm renal stone which has led to bacteremia. PCR of Blood shows E coli and Enterobacter, sn pending. Meanwhile continue  Empiric rocephin. Continue Urology did Cystoscopy with Right stent insertion on 9/14  When she was a child she did have some type of procedure for ureteral obstruction  On 9/15 changing IV narcotics to p.o. [place]  Ucx 9/13: collected at 22:42 E Coli with pan sensitivity. Ucx 9/14: collected at 14:00 pending     2. Hypertension monitor on home medications. 3. Anxiety with depression  4. H/o PFO: id noted to f/u echo on 9/15  5. No change to outpatient treatment regimen for the existing stable combordities including:  Chronic back pain arthritis carotid stenosis cerebral infarction fibromyalgia hyperlipidemia seizure disorder. It seems that in the past she was in the midst of a work-up for lupus with questionable results  6. DVT Prophylaxis :  Lovenox   7. Full code. 8. Disposition Home      NOTE: This report was transcribed using voice recognition software. Every effort was made to ensure accuracy; however, inadvertent computerized transcription errors may be present.      Electronically signed by Alisha Wells MD on 9/16/2022 at 8:15 AM

## 2022-09-17 VITALS
HEART RATE: 79 BPM | HEIGHT: 63 IN | SYSTOLIC BLOOD PRESSURE: 106 MMHG | TEMPERATURE: 98.2 F | WEIGHT: 226.1 LBS | BODY MASS INDEX: 40.06 KG/M2 | DIASTOLIC BLOOD PRESSURE: 61 MMHG | RESPIRATION RATE: 17 BRPM | OXYGEN SATURATION: 94 %

## 2022-09-17 LAB — URINE CULTURE, ROUTINE: NORMAL

## 2022-09-17 PROCEDURE — 6370000000 HC RX 637 (ALT 250 FOR IP): Performed by: SPECIALIST

## 2022-09-17 PROCEDURE — 6370000000 HC RX 637 (ALT 250 FOR IP): Performed by: UROLOGY

## 2022-09-17 PROCEDURE — 6360000002 HC RX W HCPCS: Performed by: UROLOGY

## 2022-09-17 PROCEDURE — 99239 HOSP IP/OBS DSCHRG MGMT >30: CPT | Performed by: INTERNAL MEDICINE

## 2022-09-17 RX ADMIN — ATORVASTATIN CALCIUM 40 MG: 40 TABLET, FILM COATED ORAL at 09:38

## 2022-09-17 RX ADMIN — METOPROLOL SUCCINATE 25 MG: 25 TABLET, EXTENDED RELEASE ORAL at 09:38

## 2022-09-17 RX ADMIN — ASPIRIN 81 MG: 81 TABLET, COATED ORAL at 09:38

## 2022-09-17 RX ADMIN — ACETAMINOPHEN 650 MG: 325 TABLET ORAL at 11:07

## 2022-09-17 RX ADMIN — ENOXAPARIN SODIUM 30 MG: 100 INJECTION SUBCUTANEOUS at 09:37

## 2022-09-17 RX ADMIN — CIPROFLOXACIN HYDROCHLORIDE 500 MG: 500 TABLET, FILM COATED ORAL at 09:38

## 2022-09-17 RX ADMIN — ACETAMINOPHEN 650 MG: 325 TABLET ORAL at 05:16

## 2022-09-17 RX ADMIN — PANTOPRAZOLE SODIUM 40 MG: 40 TABLET, DELAYED RELEASE ORAL at 05:16

## 2022-09-17 RX ADMIN — BUSPIRONE HYDROCHLORIDE 10 MG: 10 TABLET ORAL at 09:38

## 2022-09-17 ASSESSMENT — PAIN DESCRIPTION - DESCRIPTORS: DESCRIPTORS: ACHING;DISCOMFORT;SORE

## 2022-09-17 ASSESSMENT — PAIN SCALES - GENERAL
PAINLEVEL_OUTOF10: 0
PAINLEVEL_OUTOF10: 0
PAINLEVEL_OUTOF10: 4

## 2022-09-17 ASSESSMENT — PAIN DESCRIPTION - LOCATION: LOCATION: HEAD

## 2022-09-17 NOTE — PLAN OF CARE
Problem: Discharge Planning  Goal: Discharge to home or other facility with appropriate resources  9/17/2022 0058 by Monique Rae RN  Outcome: Progressing     Problem: Safety - Adult  Goal: Free from fall injury  9/17/2022 0058 by Monique Rae RN  Outcome: Progressing     Problem: Pain  Goal: Verbalizes/displays adequate comfort level or baseline comfort level  9/17/2022 0058 by Monique Rae RN  Outcome: Progressing     Problem: ABCDS Injury Assessment  Goal: Absence of physical injury  9/17/2022 0058 by Monique Rae RN  Outcome: Progressing     Problem: Chronic Conditions and Co-morbidities  Goal: Patient's chronic conditions and co-morbidity symptoms are monitored and maintained or improved  9/17/2022 0058 by Monique Rae RN  Outcome: Progressing     Problem: Genitourinary - Adult  Goal: Absence of urinary retention  9/17/2022 0058 by Monique Rae RN  Outcome: Progressing

## 2022-09-17 NOTE — DISCHARGE SUMMARY
Physician Discharge Summary     Patient ID:  Mary Thibodeaux  [id number]  36 y.o.  1965    Admit date: 9/13/2022    Discharge date and time: No discharge date for patient encounter. Admitting Physician: Noemi Nyhan, MD     [facility]    Admission Diagnoses: Ureterolithiasis [E36.3]  Complicated UTI (urinary tract infection) [N39.0]  Urinary tract infection with hematuria, site unspecified [N39.0, R31.9]    Discharge Diagnoses:   Complicated UTI due to Ureteral stone with obstruction  Bacteremia due to E. Coli  Hypertension Essential  Anxiety  Hx of PFO      Admission Condition: fair    Discharged Condition: good    Indication for Admission:     Hospital Course:   Mrs. Gold is admitted with severe abdominal pain. Work up showed ureteral stone with obstruction. She was also noted to have pyelonepritis. She was started on IV abx. Urology team was consulted and she had cystoscopy with stent placement. Further work up showed bacteremia and this was the same as urine culture. She was evaluated and ID recommended Cipro for final abx at discharge. She will follow up with Urology team for stent removal and further stone management. Time spent in discharge of patient is greater than 30 minutes. Consults: ID and urology    Significant Diagnostic Studies: labs:     Treatments: IV hydration and antibiotics: Cipro and ceftriaxone    Discharge Exam:  /61   Pulse 79   Temp 98.2 °F (36.8 °C) (Oral)   Resp 17   Ht 5' 3\" (1.6 m)   Wt 226 lb 1.6 oz (102.6 kg)   LMP 06/20/2012   SpO2 94%   BMI 40.05 kg/m²   Head: Normocephalic, without obvious abnormality, atraumatic  Eyes: conjunctivae/corneas clear. PERRL, EOM's intact. Fundi benign. Ears: normal TM's and external ear canals both ears  Nose: Nares normal. Septum midline. Mucosa normal. No drainage or sinus tenderness.   Lungs: clear to auscultation bilaterally  Heart: regular rate and rhythm, S1, S2 normal, no murmur, click, rub or gallop  Abdomen: soft, non-tender; bowel sounds normal; no masses,  no organomegaly  Extremities: extremities normal, atraumatic, no cyanosis or edema  Pulses: 2+ and symmetric    Disposition: home    In process/preliminary results:  Outstanding Order Results       Date and Time Order Name Status Description    9/13/2022  6:21 PM Culture, Blood 1 Preliminary             Patient Instructions:   Current Discharge Medication List        START taking these medications    Details   ciprofloxacin (CIPRO) 500 MG tablet Take 1 tablet by mouth every 12 hours for 14 doses  Qty: 14 tablet, Refills: 0      HYDROcodone-acetaminophen (NORCO) 5-325 MG per tablet Take 2 tablets by mouth every 6 hours as needed for Pain for up to 30 doses.   Qty: 30 tablet, Refills: 0    Comments: Reduce doses taken as pain becomes manageable  Associated Diagnoses: Complicated UTI (urinary tract infection)           CONTINUE these medications which have NOT CHANGED    Details   RA VITAMIN C 250 MG tablet take 1 tablet by mouth once daily      FEROSUL 325 (65 Fe) MG tablet take 1 tablet by mouth once daily with VITAMIN C      omeprazole (PRILOSEC) 10 MG delayed release capsule take 1 capsule by mouth once daily BEFORE A MEAL      metoprolol succinate (TOPROL XL) 25 MG extended release tablet Take 1 tablet by mouth daily  Qty: 90 tablet, Refills: 3    Associated Diagnoses: S/P percutaneous patent foramen ovale closure      atorvastatin (LIPITOR) 40 MG tablet Take 1 tablet by mouth daily  Qty: 90 tablet, Refills: 3      busPIRone (BUSPAR) 10 MG tablet take 1 tablet by mouth twice a day      aspirin (PX ENTERIC ASPIRIN) 81 MG EC tablet Take 1 tablet by mouth daily  Qty: 30 tablet, Refills: 3      Multiple Vitamin (MULTI-VITAMIN DAILY PO) Take by mouth daily           STOP taking these medications       ondansetron (ZOFRAN ODT) 4 MG disintegrating tablet Comments:   Reason for Stopping:         meclizine (ANTIVERT) 25 MG tablet Comments:   Reason for Stopping:             Activity: activity as tolerated  Diet: regular diet  Wound Care: none needed    Follow-up with PCP/Urology as scheduled    Signed:  Froy Del Riowholly  9/17/2022  11:16 AM

## 2022-09-17 NOTE — PROGRESS NOTES
Pt was ordered discharge late in the afternoon. I told patient that there was a discharge in and pt explained she did not have her keys or a ride home to leave. I notified the doctor and they said it was okay to stay.

## 2022-09-17 NOTE — PLAN OF CARE
Problem: Discharge Planning  Goal: Discharge to home or other facility with appropriate resources  9/17/2022 1006 by Jono Aleman RN  Outcome: Completed  9/17/2022 0058 by Luciana Escalera RN  Outcome: Progressing

## 2022-09-19 LAB
BLOOD CULTURE, ROUTINE: ABNORMAL
BLOOD CULTURE, ROUTINE: ABNORMAL
ORGANISM: ABNORMAL

## 2022-09-19 NOTE — PROGRESS NOTES
CLINICAL PHARMACY NOTE: MEDS TO BEDS    Total # of Prescriptions Filled: 1   The following medications were delivered to the patient:  Ciprofloxacin 500 mg    Additional Documentation:

## 2022-09-22 ENCOUNTER — TELEPHONE (OUTPATIENT)
Dept: CARDIOLOGY | Age: 57
End: 2022-09-22

## 2022-09-22 NOTE — TELEPHONE ENCOUNTER
Called patient and scheduled her stress test 9/26/22. Patient wanted to know if Dr Glenn Martinez can look at the Echo that was done in the hospital on 9/13/22. She said that the echo showed that her heart is \"swollen\".   Electronically signed by Luann Delgado on 9/22/2022 at 2:05 PM

## 2022-09-22 NOTE — TELEPHONE ENCOUNTER
There is no such thing. I also disagree with the interpretation of the echo. Heart function is completely normal; both LV and RV sizes are normal..  There is slight enlargement of the left atrium which is seen commonly with high blood pressure and advancing age. All the valves look good. The PFO closure looks intact with no right to left or left to right shunt.

## 2022-09-23 NOTE — TELEPHONE ENCOUNTER
All questions answered to patient satisfaction let her know per Dr. Rhonda Cervantes she can not be cleared for surgery until stress and follow up office.  Because of missed appointments    Spoke to patient she voiced understanding         [facility] [address] CARLA CANNON [facility] , [address] [place] [address]    Ph. [phone]  Fax [phone]    Cystoscopy, retrograde , pyelograms, ureteroscopy , laser lithotripsy, [place] , RT

## 2022-09-26 ENCOUNTER — HOSPITAL ENCOUNTER (OUTPATIENT)
Dept: CARDIOLOGY | Age: 57
Discharge: HOME | End: 2022-09-26
Payer: COMMERCIAL

## 2022-09-26 ENCOUNTER — OFFICE VISIT (OUTPATIENT)
Dept: CARDIOLOGY CLINIC | Age: 57
End: 2022-09-26
Payer: COMMERCIAL

## 2022-09-26 VITALS
WEIGHT: 226.8 LBS | DIASTOLIC BLOOD PRESSURE: 78 MMHG | BODY MASS INDEX: 40.18 KG/M2 | RESPIRATION RATE: 18 BRPM | SYSTOLIC BLOOD PRESSURE: 122 MMHG | HEIGHT: 63 IN

## 2022-09-26 VITALS
WEIGHT: 229 LBS | BODY MASS INDEX: 40.57 KG/M2 | DIASTOLIC BLOOD PRESSURE: 70 MMHG | HEART RATE: 66 BPM | SYSTOLIC BLOOD PRESSURE: 100 MMHG | HEIGHT: 63 IN

## 2022-09-26 DIAGNOSIS — R06.09 DOE (DYSPNEA ON EXERTION): ICD-10-CM

## 2022-09-26 DIAGNOSIS — Z01.818 PREOP EXAMINATION: Primary | ICD-10-CM

## 2022-09-26 LAB
LV EF: 72 %
LVEF MODALITY: NORMAL

## 2022-09-26 PROCEDURE — 3430000000 HC RX DIAGNOSTIC RADIOPHARMACEUTICAL: Performed by: INTERNAL MEDICINE

## 2022-09-26 PROCEDURE — 1036F TOBACCO NON-USER: CPT | Performed by: INTERNAL MEDICINE

## 2022-09-26 PROCEDURE — 99214 OFFICE O/P EST MOD 30 MIN: CPT | Performed by: INTERNAL MEDICINE

## 2022-09-26 PROCEDURE — G8417 CALC BMI ABV UP PARAM F/U: HCPCS | Performed by: INTERNAL MEDICINE

## 2022-09-26 PROCEDURE — 2580000003 HC RX 258: Performed by: INTERNAL MEDICINE

## 2022-09-26 PROCEDURE — 1111F DSCHRG MED/CURRENT MED MERGE: CPT | Performed by: INTERNAL MEDICINE

## 2022-09-26 PROCEDURE — 3017F COLORECTAL CA SCREEN DOC REV: CPT | Performed by: INTERNAL MEDICINE

## 2022-09-26 PROCEDURE — 93017 CV STRESS TEST TRACING ONLY: CPT

## 2022-09-26 PROCEDURE — 78452 HT MUSCLE IMAGE SPECT MULT: CPT

## 2022-09-26 PROCEDURE — G8427 DOCREV CUR MEDS BY ELIG CLIN: HCPCS | Performed by: INTERNAL MEDICINE

## 2022-09-26 PROCEDURE — A9502 TC99M TETROFOSMIN: HCPCS | Performed by: INTERNAL MEDICINE

## 2022-09-26 RX ORDER — SODIUM CHLORIDE 0.9 % (FLUSH) 0.9 %
10 SYRINGE (ML) INJECTION PRN
Status: DISCONTINUED | OUTPATIENT
Start: 2022-09-26 | End: 2022-09-27 | Stop reason: HOSPADM

## 2022-09-26 RX ORDER — HYDROCODONE BITARTRATE AND ACETAMINOPHEN 5; 325 MG/1; MG/1
2 TABLET ORAL EVERY 6 HOURS PRN
COMMUNITY

## 2022-09-26 RX ADMIN — SODIUM CHLORIDE, PRESERVATIVE FREE 10 ML: 5 INJECTION INTRAVENOUS at 10:05

## 2022-09-26 RX ADMIN — TETROFOSMIN 8.2 MILLICURIE: 0.23 INJECTION, POWDER, LYOPHILIZED, FOR SOLUTION INTRAVENOUS at 08:43

## 2022-09-26 RX ADMIN — TETROFOSMIN 28.3 MILLICURIE: 0.23 INJECTION, POWDER, LYOPHILIZED, FOR SOLUTION INTRAVENOUS at 10:04

## 2022-09-26 RX ADMIN — SODIUM CHLORIDE, PRESERVATIVE FREE 10 ML: 5 INJECTION INTRAVENOUS at 08:43

## 2022-09-26 ASSESSMENT — ENCOUNTER SYMPTOMS
BACK PAIN: 0
WHEEZING: 0
DIARRHEA: 0
NAUSEA: 0
CONSTIPATION: 0
COUGH: 0
ABDOMINAL PAIN: 0
SHORTNESS OF BREATH: 0
BLOOD IN STOOL: 0
VOMITING: 0

## 2022-09-26 NOTE — DISCHARGE INSTRUCTIONS
[id number] [facility]      Instructions to Patients    The following are the instructions for patients who have had a procedure in our office today. Patient name: Quynh Gold    Radionuclide Activity: 40mCi of 99mTc-Tetrofosmin (Myoview)    Date Administered: 9/26/2022    Expires: 48 hours after scheduled appointment time      Patient may resume normal activity unless otherwise instructed. Patient may resume medications as normal.  If the need should arise, patient may call [phone] between the hours of 8:00am-4:30pm.  After hours there is at least one physician on-call at all times for those patients needing assistance. Patients may call [phone] and the answering service will direct the patient to one of our physicians for assistance. After the patient's test if they are going to be leaving from an airport in the near future they should take this letter with them to verify the test and radionuclide used for their test.      This letter verifies that the above named bearer received an injection of a radionuclide for medical purpose/usage only.         Electronically signed by Dino Packer on 9/26/2022 at 9:36 AM

## 2022-09-26 NOTE — PROGRESS NOTES
Cerebral artery occlusion with cerebral infarction ([address]     Fibromyalgia     History of blood transfusion     Hyperlipidemia     Lupus ([address]     questionable    Pelvic peritoneal adhesions, female (postoperative) (postinfection) 2012    Right leg pain     Seizure ([address]     last one 30 yrs ago- h/o epilepsy as a child    Unspecified symptom associated with female genital organs 2012    Ureteral obstruction as a child had surgery to correct        Past Surgical History:  Past Surgical History:   Procedure Laterality Date    ABDOMINAL ADHESION SURGERY  aug 2011    laproscopy lysis of adhesions left ovarian cystotomy    BACK SURGERY  2011    lumbar    BLADDER SURGERY Right 2022    CYSTOSCOPY  RIGHT  STENT INSERTION performed by Cj Preciado DO at [facility]  lt breast lumpectomy    benign    CARDIAC SURGERY      MESH     CARPAL TUNNEL RELEASE Right 2014    CARPAL TUNNEL RELEASE Left 2016     SECTION  2 c sections    FOOT SURGERY  rt foot spur    HYSTERECTOMY (CERVIX STATUS UNKNOWN)  partial hysterectomy    JOINT REPLACEMENT Right pt had 3 knee replacements    KIDNEY SURGERY  ureteral obstruction as child    KNEE ARTHROSCOPY Left 11/05/15    medial menisectomy, chondroplasty, synovectomy, debridement, osteophytes    OTHER SURGICAL HISTORY Right 2017    hand CPC interpositional arthroplasty    OTHER SURGICAL HISTORY Left 2017    left carpal metacarpal arthroplasty    OTHER SURGICAL HISTORY  2020    Dr Jacob Wilkins - Amplatzer 25mm PFO occluder - Lot# [id number]    SALPINGO-OOPHORECTOMY  2012    Attempted laparoscopy, open laparotomy, lysis of adhesions, and BSO       Family History:  Family History   Problem Relation Age of Onset    Heart Attack Mother     Alzheimer's Disease Mother     Breast Cancer Mother     Cancer Father         skin rare form     Diabetes Father     No Known Problems Sister     No Known Problems Brother     No Known Problems Sister COPD Sister     Diabetes Brother     No Known Problems Brother     Colon Cancer Maternal Grandmother     Breast Cancer Maternal Aunt     Colon Cancer Maternal Aunt     Ovarian Cancer Maternal Cousin        Social History:  Social History     Socioeconomic History    Marital status:      Spouse name: Not on file    Number of children: Not on file    Years of education: Not on file    Highest education level: Not on file   Occupational History    Not on file   Tobacco Use    Smoking status: Never    Smokeless tobacco: Never   Vaping Use    Vaping Use: Never used   Substance and Sexual Activity    Alcohol use: Yes     Comment: occ    Drug use: Never    Sexual activity: Not on file   Other Topics Concern    Not on file   Social History Narrative    Drinks 2 cups of coffee daily. Social Determinants of Health     Financial Resource Strain: Not on file   Food Insecurity: Not on file   Transportation Needs: Not on file   Physical Activity: Not on file   Stress: Not on file   Social Connections: Not on file   Intimate Partner Violence: Not on file   Housing Stability: Not on file       Allergies:  No Known Allergies    Current Medications:  Current Outpatient Medications   Medication Sig Dispense Refill    HYDROcodone-acetaminophen (NORCO) 5-325 MG per tablet Take 2 tablets by mouth every 6 hours as needed for Pain.       RA VITAMIN C 250 MG tablet take 1 tablet by mouth once daily      FEROSUL 325 (65 Fe) MG tablet take 1 tablet by mouth once daily with VITAMIN C      omeprazole (PRILOSEC) 10 MG delayed release capsule take 1 capsule by mouth once daily BEFORE A MEAL      metoprolol succinate (TOPROL XL) 25 MG extended release tablet Take 1 tablet by mouth daily 90 tablet 3    atorvastatin (LIPITOR) 40 MG tablet Take 1 tablet by mouth daily 90 tablet 3    busPIRone (BUSPAR) 10 MG tablet take 1 tablet by mouth twice a day      aspirin (PX ENTERIC ASPIRIN) 81 MG EC tablet Take 1 tablet by mouth daily 30 tablet 3 Multiple Vitamin (MULTI-VITAMIN DAILY PO) Take by mouth daily (Patient not taking: No sig reported)       No current facility-administered medications for this visit. Facility-Administered Medications Ordered in Other Visits   Medication Dose Route Frequency Provider Last Rate Last Admin    sodium chloride flush 0.9 % injection 10 mL  10 mL IntraVENous PRN Jerie Halsted, MD   10 mL at 09/26/22 1005       Physical Exam:  LMP 06/20/2012   Wt Readings from Last 3 Encounters:   09/26/22 229 lb (103.9 kg)   09/14/22 226 lb 1.6 oz (102.6 kg)   08/31/22 228 lb (103.4 kg)       Physical Exam  Constitutional:       General: She is not in acute distress. Appearance: She is well-developed. She is obese. HENT:      Head: Normocephalic and atraumatic. Neck:      Vascular: No carotid bruit or JVD. Cardiovascular:      Rate and Rhythm: Normal rate and regular rhythm. Heart sounds: No murmur heard. No friction rub. No gallop. Pulmonary:      Breath sounds: Normal breath sounds. No wheezing or rales. Chest:      Chest wall: No tenderness. Abdominal:      General: Bowel sounds are normal. There is no distension. Palpations: Abdomen is soft. There is no mass. Tenderness: There is no abdominal tenderness. Comments: No abdominal bruit. Musculoskeletal:      Cervical back: Neck supple. Right lower leg: No edema. Left lower leg: No edema. Skin:     General: Skin is warm and dry. Neurological:      Mental Status: She is alert and oriented to person, place, and time.          Laboratory Tests:  Lab Results   Component Value Date    CREATININE 0.6 09/15/2022    BUN 16 09/15/2022     09/15/2022    K 4.2 09/15/2022     09/15/2022    CO2 23 09/15/2022       Lab Results   Component Value Date    WBC 5.2 09/15/2022    HGB 10.6 (L) 09/15/2022    HCT 33.3 (L) 09/15/2022    MCV 91.5 09/15/2022     09/15/2022     Lab Results   Component Value Date    ALT 11 09/15/2022    AST 13 09/15/2022    ALKPHOS 64 09/15/2022    BILITOT <0.2 09/15/2022     Lab Results   Component Value Date    TROPONINI <0.01 05/20/2021     Lab Results   Component Value Date    INR 1.0 07/22/2020    INR 1.0 05/18/2011    PROTIME 10.8 07/22/2020    PROTIME 11.0 05/18/2011     Lab Results   Component Value Date    TSH 3.800 11/08/2019     Lab Results   Component Value Date    LABA1C 5.3 11/08/2019     Lab Results   Component Value Date    CHOL 151 07/22/2020    CHOL 200 (H) 11/08/2019     Lab Results   Component Value Date    TRIG 74 07/22/2020    TRIG 89 11/08/2019     Lab Results   Component Value Date    HDL 77 07/22/2020    HDL 77 11/08/2019     Lab Results   Component Value Date    LDLCALC 59 07/22/2020    LDLCALC 105 (H) 11/08/2019     Lab Results   Component Value Date    LABVLDL 15 07/22/2020    LABVLDL 18 11/08/2019       Cardiac Tests:    Exercise stress test:  The patient exercised using a Micah protocol, completing 6:01 minutes and reaching an estimated work load of 7.0 metabolic equivalents (METS). Resting HR was 66. Peak exercise heart rate was 141 ( 87% of maximum predicted heart rate for age). Baseline /70. Peak exercise /70. The blood pressure response to exercise was normal       Exercise was terminated due to dyspnea and heart rate attained. The patient experienced no chest pain with exercise. Pulse oximetry was used to monitor oxygen saturation during the stress test.  The study was performed on Room Air. The resting pulse oximeter was 95%. The post stress O2 saturation seen during exercise was 97 %. Exercise ECG:   The patient demonstrated occasional PVC's during exercise. With exercise, there were no ST segment changes of significance at the heart rate achieved.         IMAGING: Myocardial perfusion imaging was performed at rest 30-35 minutes following the intravenous injection of 8.2 mCi of (Tc-tetrofosmin) followed by 10 ml of Normal Saline. At peak exercise, the patient was injected intravenously with 28.3 mCi of (Tc-tetrofosmin) followed by 10 ml of Normal Saline. Gated post-stress tomographic imaging was performed 20-25 minutes after stress. FINDINGS: The overall quality of the study was good. Left ventricular cavity size was noted to be normal.     Rotational analog analysis demonstrated abnormal extracardiac radioactivity and soft tissue breast attenuation. The gated SPECT stress imaging in the short, vertical long, and horizontal long axis demonstrated normal homogeneous tracer distribution throughout the myocardium both on the post stress and resting images. The resting images are show no change. Gated SPECT left ventricular ejection fraction was calculated to be 72%, with normal myocardial thickening and wall motion. Impression:    Exercise EKG was normal.   The patient experienced no chest pain with exercise. The myocardial perfusion imaging was normal.    Overall left ventricular systolic function was normal without regional wall motion abnormalities. Exercise capacity was average. 6. Low risk general exercise nuclear stress test.       Echocardiogram done on 9/15/2020:  No Echo indication of Endocarditis. Normal left ventricular chamber size. Normal left ventricular systolic function, EF 76%. Mild left ventricular concentric hypertrophy noted. Stage II diastolic dysfunction. Left atrium is enlarged. Increased left atrial volume. PFO closure device noted. Hx of PFO closure with 25-mm Amplatzer occluder. Doppler interrogation showed possible small left to right interatrial shunt. Agitated saline injection showed some right to left interatrial shunt. Normal right ventricle size and function. Mildly enlarged right atrium. There is trace mitral regurgitation. No mitral valve prolapse.   No hemodynamically significant aortic stenosis. Normal aortic root size. No evidence of pericardial effusion. No intra cardiac mass or thrombus. Compared to prior echo from 7/30/2021. ASSESSMENT / PLAN:  -Preop clearance: Patient lithotripsy carries a low risk for perioperative cardiovascular events. She has normal exercise nuclear stress test today. She is cleared to undergo the procedure from the cardiac standpoint of view. -Status post PFO closure with questionable small left-to-right shunt by recent echocardiogram.  She has been on aspirin.  -Status post cryptogenic TIA. .  -Minimal carotid artery disease.  -History of PVCs. -Anemia. -Questionable lupus.  -Fibromyalgia.  -Chronic pain syndrome.  -History of seizures.  -Status post COVID-19 infection.  -Benign neoplasm of the ovary.  -Obesity. Patient was told to hold her aspirin prior to lithotripsy and to resume it ASAP when allowed after the procedure. Will continue Toprol and atorvastatin. Patient was advised to lose weight. Follow-up in the office in 1 year. The patient's current medication list, allergies, problem list and results of all previously ordered testing were reviewed at today's visit. Mindy Elder MD , [facility].   [facility]) Cardiology

## 2022-09-26 NOTE — PROCEDURES
[id number] Counts include 234 beds at the [facility],[address] and Vascular Lab - [address]. CRISTEL maravilla, [address] [place] [address]  [phone]                  Exercise Stress Nuclear Gated SPECT Study    Name: [address] [place] [address] Road Account Number: [de-identified]    :  1965      Sex: female              Date of Study:  2022    Height: 5' 3\" (160 cm)  Weight: 229 lb (103.9 kg)     Ordering Provider: Chey Rios MD          PCP: LONNY Rebolledo      Cardiologist: Chey Rios MD                        Interpreting Physician: Shaq Dempsey MD  _________________________________________________________________________________    Indication:   Detecting the presence and location of coronary artery disease    Clinical History:   Patient has no known history of coronary artery disease. Resting ECG:    Normal sinus rhythm    Exercise: The patient exercised using a Micah protocol, completing 6:01 minutes and reaching an estimated work load of 7.0 metabolic equivalents (METS). Resting HR was 66. Peak exercise heart rate was 141 ( 87% of maximum predicted heart rate for age). Baseline /70. Peak exercise /70. The blood pressure response to exercise was normal      Exercise was terminated due to dyspnea and heart rate attained. The patient experienced no chest pain with exercise. Pulse oximetry was used to monitor oxygen saturation during the stress test.  The study was performed on Room Air. The resting pulse oximeter was 95%. The post stress O2 saturation seen during exercise was 97 %. Exercise ECG:   The patient demonstrated occasional PVC's during exercise. With exercise, there were no ST segment changes of significance at the heart rate achieved. IMAGING: Myocardial perfusion imaging was performed at rest 30-35 minutes following the intravenous injection of 8.2 mCi of (Tc-tetrofosmin) followed by 10 ml of Normal Saline.   At peak exercise, the patient was injected intravenously with 28.3 mCi of (Tc-tetrofosmin) followed by 10 ml of Normal Saline. Gated post-stress tomographic imaging was performed 20-25 minutes after stress. FINDINGS: The overall quality of the study was good. Left ventricular cavity size was noted to be normal.    Rotational analog analysis demonstrated abnormal extracardiac radioactivity and soft tissue breast attenuation. The gated SPECT stress imaging in the short, vertical long, and horizontal long axis demonstrated normal homogeneous tracer distribution throughout the myocardium both on the post stress and resting images. The resting images are show no change. Gated SPECT left ventricular ejection fraction was calculated to be 72%, with normal myocardial thickening and wall motion. Impression:    Exercise EKG was normal.   The patient experienced no chest pain with exercise. The myocardial perfusion imaging was normal.    Overall left ventricular systolic function was normal without regional wall motion abnormalities. Exercise capacity was average. 6. Low risk general exercise nuclear stress test.    Thank you for sending your patient to this [employer].      Electronically signed by Juan M Valencia MD on 9/26/22 at 2:13 PM EDT

## 2022-09-28 ENCOUNTER — HOSPITAL ENCOUNTER (EMERGENCY)
Age: 57
Discharge: HOME | End: 2022-09-28
Attending: EMERGENCY MEDICINE
Payer: COMMERCIAL

## 2022-09-28 VITALS
DIASTOLIC BLOOD PRESSURE: 66 MMHG | TEMPERATURE: 98.1 F | SYSTOLIC BLOOD PRESSURE: 113 MMHG | RESPIRATION RATE: 18 BRPM | OXYGEN SATURATION: 95 % | HEART RATE: 71 BPM

## 2022-09-28 DIAGNOSIS — R11.2 NON-INTRACTABLE VOMITING WITH NAUSEA, UNSPECIFIED VOMITING TYPE: Primary | ICD-10-CM

## 2022-09-28 DIAGNOSIS — G89.18 POST-OP PAIN: ICD-10-CM

## 2022-09-28 DIAGNOSIS — R10.9 FLANK PAIN: ICD-10-CM

## 2022-09-28 LAB
ALBUMIN SERPL-MCNC: 4 G/DL (ref 3.5–5.2)
ALP BLD-CCNC: 79 U/L (ref 35–104)
ALT SERPL-CCNC: 17 U/L (ref 0–32)
ANION GAP SERPL CALCULATED.3IONS-SCNC: 9 MMOL/L (ref 7–16)
AST SERPL-CCNC: 16 U/L (ref 0–31)
BACTERIA: ABNORMAL /HPF
BASOPHILS ABSOLUTE: 0.02 E9/L (ref 0–0.2)
BASOPHILS RELATIVE PERCENT: 0.1 % (ref 0–2)
BILIRUB SERPL-MCNC: 0.3 MG/DL (ref 0–1.2)
BILIRUBIN URINE: NEGATIVE
BLOOD, URINE: ABNORMAL
BUN BLDV-MCNC: 16 MG/DL (ref 6–20)
CALCIUM SERPL-MCNC: 10.1 MG/DL (ref 8.6–10.2)
CHLORIDE BLD-SCNC: 104 MMOL/L (ref 98–107)
CLARITY: ABNORMAL
CO2: 26 MMOL/L (ref 22–29)
COLOR: YELLOW
CREAT SERPL-MCNC: 0.8 MG/DL (ref 0.5–1)
EOSINOPHILS ABSOLUTE: 0.01 E9/L (ref 0.05–0.5)
EOSINOPHILS RELATIVE PERCENT: 0.1 % (ref 0–6)
EPITHELIAL CELLS, UA: ABNORMAL /HPF
GFR AFRICAN AMERICAN: >60
GFR NON-AFRICAN AMERICAN: >60 ML/MIN/1.73
GLUCOSE BLD-MCNC: 120 MG/DL (ref 74–99)
GLUCOSE URINE: NEGATIVE MG/DL
HCT VFR BLD CALC: 36.9 % (ref 34–48)
HEMOGLOBIN: 12 G/DL (ref 11.5–15.5)
IMMATURE GRANULOCYTES #: 0.07 E9/L
IMMATURE GRANULOCYTES %: 0.5 % (ref 0–5)
KETONES, URINE: NEGATIVE MG/DL
LACTIC ACID, SEPSIS: 1 MMOL/L (ref 0.5–1.9)
LEUKOCYTE ESTERASE, URINE: NEGATIVE
LIPASE: 16 U/L (ref 13–60)
LYMPHOCYTES ABSOLUTE: 0.78 E9/L (ref 1.5–4)
LYMPHOCYTES RELATIVE PERCENT: 5.3 % (ref 20–42)
MCH RBC QN AUTO: 29.1 PG (ref 26–35)
MCHC RBC AUTO-ENTMCNC: 32.5 % (ref 32–34.5)
MCV RBC AUTO: 89.3 FL (ref 80–99.9)
MONOCYTES ABSOLUTE: 0.41 E9/L (ref 0.1–0.95)
MONOCYTES RELATIVE PERCENT: 2.8 % (ref 2–12)
NEUTROPHILS ABSOLUTE: 13.32 E9/L (ref 1.8–7.3)
NEUTROPHILS RELATIVE PERCENT: 91.2 % (ref 43–80)
NITRITE, URINE: NEGATIVE
PDW BLD-RTO: 14.6 FL (ref 11.5–15)
PH UA: 5.5 (ref 5–9)
PLATELET # BLD: 320 E9/L (ref 130–450)
PMV BLD AUTO: 10 FL (ref 7–12)
POTASSIUM SERPL-SCNC: 4.1 MMOL/L (ref 3.5–5)
PROTEIN UA: 100 MG/DL
RBC # BLD: 4.13 E12/L (ref 3.5–5.5)
RBC UA: >20 /HPF (ref 0–2)
SODIUM BLD-SCNC: 139 MMOL/L (ref 132–146)
SPECIFIC GRAVITY UA: >=1.03 (ref 1–1.03)
TOTAL PROTEIN: 7.7 G/DL (ref 6.4–8.3)
UROBILINOGEN, URINE: 0.2 E.U./DL
WBC # BLD: 14.6 E9/L (ref 4.5–11.5)
WBC UA: ABNORMAL /HPF (ref 0–5)

## 2022-09-28 PROCEDURE — 83690 ASSAY OF LIPASE: CPT

## 2022-09-28 PROCEDURE — 87088 URINE BACTERIA CULTURE: CPT

## 2022-09-28 PROCEDURE — 87040 BLOOD CULTURE FOR BACTERIA: CPT

## 2022-09-28 PROCEDURE — 2580000003 HC RX 258: Performed by: EMERGENCY MEDICINE

## 2022-09-28 PROCEDURE — 81001 URINALYSIS AUTO W/SCOPE: CPT

## 2022-09-28 PROCEDURE — 80053 COMPREHEN METABOLIC PANEL: CPT

## 2022-09-28 PROCEDURE — 96376 TX/PRO/DX INJ SAME DRUG ADON: CPT

## 2022-09-28 PROCEDURE — 6360000002 HC RX W HCPCS: Performed by: EMERGENCY MEDICINE

## 2022-09-28 PROCEDURE — 96375 TX/PRO/DX INJ NEW DRUG ADDON: CPT

## 2022-09-28 PROCEDURE — 99284 EMERGENCY DEPT VISIT MOD MDM: CPT

## 2022-09-28 PROCEDURE — 83605 ASSAY OF LACTIC ACID: CPT

## 2022-09-28 PROCEDURE — 6370000000 HC RX 637 (ALT 250 FOR IP): Performed by: EMERGENCY MEDICINE

## 2022-09-28 PROCEDURE — 96374 THER/PROPH/DIAG INJ IV PUSH: CPT

## 2022-09-28 PROCEDURE — 85025 COMPLETE CBC W/AUTO DIFF WBC: CPT

## 2022-09-28 RX ORDER — ONDANSETRON 4 MG/1
4 TABLET, ORALLY DISINTEGRATING ORAL EVERY 8 HOURS PRN
Qty: 10 TABLET | Refills: 0 | Status: SHIPPED | OUTPATIENT
Start: 2022-09-28

## 2022-09-28 RX ORDER — OXYCODONE HYDROCHLORIDE AND ACETAMINOPHEN 5; 325 MG/1; MG/1
1 TABLET ORAL ONCE
Status: COMPLETED | OUTPATIENT
Start: 2022-09-28 | End: 2022-09-28

## 2022-09-28 RX ORDER — FENTANYL CITRATE 0.05 MG/ML
50 INJECTION, SOLUTION INTRAMUSCULAR; INTRAVENOUS ONCE
Status: COMPLETED | OUTPATIENT
Start: 2022-09-28 | End: 2022-09-28

## 2022-09-28 RX ORDER — ONDANSETRON 2 MG/ML
4 INJECTION INTRAMUSCULAR; INTRAVENOUS ONCE
Status: COMPLETED | OUTPATIENT
Start: 2022-09-28 | End: 2022-09-28

## 2022-09-28 RX ORDER — KETOROLAC TROMETHAMINE 30 MG/ML
30 INJECTION, SOLUTION INTRAMUSCULAR; INTRAVENOUS ONCE
Status: COMPLETED | OUTPATIENT
Start: 2022-09-28 | End: 2022-09-28

## 2022-09-28 RX ORDER — 0.9 % SODIUM CHLORIDE 0.9 %
1000 INTRAVENOUS SOLUTION INTRAVENOUS ONCE
Status: COMPLETED | OUTPATIENT
Start: 2022-09-28 | End: 2022-09-28

## 2022-09-28 RX ADMIN — OXYCODONE HYDROCHLORIDE AND ACETAMINOPHEN 1 TABLET: 5; 325 TABLET ORAL at 20:47

## 2022-09-28 RX ADMIN — FENTANYL CITRATE 50 MCG: 50 INJECTION INTRAMUSCULAR; INTRAVENOUS at 22:12

## 2022-09-28 RX ADMIN — SODIUM CHLORIDE 1000 ML: 9 INJECTION, SOLUTION INTRAVENOUS at 19:19

## 2022-09-28 RX ADMIN — ONDANSETRON 4 MG: 2 INJECTION INTRAMUSCULAR; INTRAVENOUS at 19:04

## 2022-09-28 RX ADMIN — KETOROLAC TROMETHAMINE 30 MG: 30 INJECTION, SOLUTION INTRAMUSCULAR; INTRAVENOUS at 19:07

## 2022-09-28 RX ADMIN — FENTANYL CITRATE 50 MCG: 50 INJECTION INTRAMUSCULAR; INTRAVENOUS at 19:05

## 2022-09-28 ASSESSMENT — PAIN DESCRIPTION - ORIENTATION
ORIENTATION: RIGHT

## 2022-09-28 ASSESSMENT — PAIN DESCRIPTION - LOCATION
LOCATION: FLANK
LOCATION: ABDOMEN

## 2022-09-28 ASSESSMENT — PAIN SCALES - GENERAL
PAINLEVEL_OUTOF10: 8
PAINLEVEL_OUTOF10: 8
PAINLEVEL_OUTOF10: 10
PAINLEVEL_OUTOF10: 6

## 2022-09-28 NOTE — ED PROVIDER NOTES
Department of Emergency Medicine   ED  Provider Note  Admit Date/RoomTime: 9/28/2022  5:08 PM  ED Room: 24/24          History of Present Illness:  9/28/22, Time: 5:48 PM EDT  Chief Complaint   Patient presents with    Flank Pain                Bridgette Rowe is a 61 y.o. female presenting to the ED for flank pain and N/V, beginning a few hours . The complaint has been persistent, severe in severity, and worsened by nothing. Presents with flank pain. History significant for recent admission earlier this month for complicated septic stone with bacteremia. She had stent performed on 9/16, she was discharged home on oral antibiotics on 9/17. States she is doing well, today she had follow-up procedure with Dr. Brady Valentin for stone removal, she reports a stent was placed however when she urinated after procedure the stent came out. States excruciating flank pain multiple episodes of nonbloody nonbilious vomiting. She denies fevers chills dysuria, reports that her pain meds and antibiotics at the pharmacy but she has not picked them up yet    Review of Systems:   Pertinent positives and negatives are stated within HPI, all other systems reviewed and are negative.        --------------------------------------------- PAST HISTORY ---------------------------------------------  Past Medical History:  has a past medical history of Arthritis, Back pain, Benign neoplasm of ovary, Blood transfusion, Carotid stenosis, Cerebral artery occlusion with cerebral infarction ([address] Fibromyalgia, History of blood transfusion, Hyperlipidemia, Lupus ([address] Pelvic peritoneal adhesions, female (postoperative) (postinfection), Right leg pain, Seizure ([address] Unspecified symptom associated with female genital organs, and Ureteral obstruction. Past Surgical History:  has a past surgical history that includes joint replacement (Right, pt had 3 knee replacements); Breast surgery (lt breast lumpectomy);  Hysterectomy (partial hysterectomy); Foot surgery (rt foot spur);  section (2 c sections); Kidney surgery (ureteral obstruction as child); Abdominal adhesion surgery (aug 2011); Salpingo-oophorectomy (2012); back surgery (); Carpal tunnel release (Right, 2014); Knee arthroscopy (Left, 11/05/15); Carpal tunnel release (Left, 2016); other surgical history (Right, 2017); other surgical history (Left, 2017); other surgical history (2020); Cardiac surgery; and Bladder surgery (Right, 2022). Social History:  reports that she has never smoked. She has never used smokeless tobacco. She reports current alcohol use. She reports that she does not use drugs. Family History: family history includes Alzheimer's Disease in her mother; Breast Cancer in her maternal aunt and mother; COPD in her sister; Cancer in her father; Colon Cancer in her maternal aunt and maternal grandmother; Diabetes in her brother and father; Heart Attack in her mother; No Known Problems in her brother, brother, sister, and sister; Ovarian Cancer in her maternal cousin. . Unless otherwise noted, family history is non contributory    The patients home medications have been reviewed. Allergies: Patient has no known allergies. ---------------------------------------------------PHYSICAL EXAM--------------------------------------    Constitutional/General: Alert and oriented x3 distress due to pain  Head: Normocephalic and atraumatic  Eyes: PERRL, EOMI, sclera non icteric  Mouth: Oropharynx clear, handling secretions, no trismus, no asymmetry of the posterior oropharynx or uvular edema  Neck: Supple, full ROM, no stridor, no meningeal signs  Respiratory: Lungs clear to auscultation bilaterally,Not in respiratory distress  Cardiovascular:  Regular rate. Regular rhythm. 2+ distal pulses. Equal extremity pulses. Chest: No chest wall tenderness  GI:  Abdomen Soft, Non distended right flank tenderness.  No rebound, guarding, or rigidity. Musculoskeletal: Moves all extremities x 4. Warm and well perfused, no clubbing, cyanosis, or edema. Capillary refill <3 seconds  Integument: skin warm and dry. No rashes. Neurologic: GCS 15, no focal deficits, symmetric strength 5/5 in the upper and lower extremities bilaterally  Psychiatric: Normal Affect           -------------------------------------------------- RESULTS -------------------------------------------------  I have personally reviewed all laboratory and imaging results for this patient. Results are listed below.      LABS: (Lab results interpreted by me)  Results for orders placed or performed during the hospital encounter of 09/28/22   Lactate, Sepsis   Result Value Ref Range    Lactic Acid, Sepsis 1.0 0.5 - 1.9 mmol/L   CBC with Auto Differential   Result Value Ref Range    WBC 14.6 (H) 4.5 - 11.5 E9/L    RBC 4.13 3.50 - 5.50 E12/L    Hemoglobin 12.0 11.5 - 15.5 g/dL    Hematocrit 36.9 34.0 - 48.0 %    MCV 89.3 80.0 - 99.9 fL    MCH 29.1 26.0 - 35.0 pg    MCHC 32.5 32.0 - 34.5 %    RDW 14.6 11.5 - 15.0 fL    Platelets 028 982 - 373 E9/L    MPV 10.0 7.0 - 12.0 fL    Neutrophils % 91.2 (H) 43.0 - 80.0 %    Immature Granulocytes % 0.5 0.0 - 5.0 %    Lymphocytes % 5.3 (L) 20.0 - 42.0 %    Monocytes % 2.8 2.0 - 12.0 %    Eosinophils % 0.1 0.0 - 6.0 %    Basophils % 0.1 0.0 - 2.0 %    Neutrophils Absolute 13.32 (H) 1.80 - 7.30 E9/L    Immature Granulocytes # 0.07 E9/L    Lymphocytes Absolute 0.78 (L) 1.50 - 4.00 E9/L    Monocytes Absolute 0.41 0.10 - 0.95 E9/L    Eosinophils Absolute 0.01 (L) 0.05 - 0.50 E9/L    Basophils Absolute 0.02 0.00 - 0.20 E9/L   Comprehensive Metabolic Panel   Result Value Ref Range    Sodium 139 132 - 146 mmol/L    Potassium 4.1 3.5 - 5.0 mmol/L    Chloride 104 98 - 107 mmol/L    CO2 26 22 - 29 mmol/L    Anion Gap 9 7 - 16 mmol/L    Glucose 120 (H) 74 - 99 mg/dL    BUN 16 6 - 20 mg/dL    Creatinine 0.8 0.5 - 1.0 mg/dL    GFR Non-African American >60 >=60 mL/min/1.73    GFR African American >60     Calcium 10.1 8.6 - 10.2 mg/dL    Total Protein 7.7 6.4 - 8.3 g/dL    Albumin 4.0 3.5 - 5.2 g/dL    Total Bilirubin 0.3 0.0 - 1.2 mg/dL    Alkaline Phosphatase 79 35 - 104 U/L    ALT 17 0 - 32 U/L    AST 16 0 - 31 U/L   Urinalysis   Result Value Ref Range    Color, UA Yellow Straw/Yellow    Clarity, UA CLOUDY (A) Clear    Glucose, Ur Negative Negative mg/dL    Bilirubin Urine Negative Negative    Ketones, Urine Negative Negative mg/dL    Specific Gravity, UA >=1.030 1.005 - 1.030    Blood, Urine LARGE (A) Negative    pH, UA 5.5 5.0 - 9.0    Protein,  (A) Negative mg/dL    Urobilinogen, Urine 0.2 <2.0 E.U./dL    Nitrite, Urine Negative Negative    Leukocyte Esterase, Urine Negative Negative   Lipase   Result Value Ref Range    Lipase 16 13 - 60 U/L   Microscopic Urinalysis   Result Value Ref Range    WBC, UA 2-5 0 - 5 /HPF    RBC, UA >20 0 - 2 /HPF    Epithelial Cells, UA MODERATE /HPF    Bacteria, UA MANY (A) None Seen /HPF   ,       RADIOLOGY:  Interpreted by Radiologist unless otherwise specified  No orders to display                    ------------------------- NURSING NOTES AND VITALS REVIEWED ---------------------------   The nursing notes within the ED encounter and vital signs as below have been reviewed by myself  /66   Pulse 71   Temp 98.1 °F (36.7 °C)   Resp 18   LMP 06/20/2012   SpO2 95%     Oxygen Saturation Interpretation: Normal         The patients available past medical records and past encounters were reviewed.         ------------------------------ ED COURSE/MEDICAL DECISION MAKING----------------------  Medications   0.9 % sodium chloride bolus (0 mLs IntraVENous Stopped 9/28/22 2048)   ondansetron (ZOFRAN) injection 4 mg (4 mg IntraVENous Given 9/28/22 1904)   ketorolac (TORADOL) injection 30 mg (30 mg IntraVENous Given 9/28/22 1907)   fentaNYL (SUBLIMAZE) injection 50 mcg (50 mcg IntraVENous Given 9/28/22 1905)   oxyCODONE-acetaminophen (PERCOCET) 5-325 MG per tablet 1 tablet (1 tablet Oral Given 9/28/22 2047)   fentaNYL (SUBLIMAZE) injection 50 mcg (50 mcg IntraVENous Given 9/28/22 2212)                    Medical Decision Making:     I, Dr. Letha Al am the primary provider of record    Patient's pain nausea better controlled after medication, she is tolerating p.o., states antibiotics and pain medicines have been sent into her pharmacy by her urologist but she has not yet picked them up. She does request additional medications for nausea. Dispo alexis supportive care, will have her call her urologist morning let them know the stent fell out when she urinated. Discharge home with outpatient follow-up have return for worsening signs or symptoms. Re-Evaluations:          Re-evaluation. Patients symptoms are improving  Repeat physical examination is improved     Re-evaluation. Patients symptoms are improving  Repeat physical examination is improved  - Pain better controlled, pt states they feel \"better\", tolerating PO well           This patient's ED course included: a personal history and physicial examination, multiple bedside re-evaluations, IV medications, and complex medical decision making and emergency management    This patient has been closely monitored during their ED course. Counseling: The emergency provider has spoken with the patient and discussed todays results, in addition to providing specific details for the plan of care and counseling regarding the diagnosis and prognosis. Questions are answered at this time and they are agreeable with the plan.       --------------------------------- IMPRESSION AND DISPOSITION ---------------------------------    IMPRESSION  1. Non-intractable vomiting with nausea, unspecified vomiting type    2. Flank pain    3.  Post-op pain        DISPOSITION  Disposition: Discharge to home  Patient condition is stable        NOTE: This report was transcribed using voice recognition software.  Every effort was made to ensure accuracy; however, inadvertent computerized transcription errors may be present        Jacquie Vegas DO  09/29/22 0107

## 2022-10-01 LAB — URINE CULTURE, ROUTINE: NORMAL

## 2022-10-03 LAB
BLOOD CULTURE, ROUTINE: NORMAL
CULTURE, BLOOD 2: NORMAL

## 2022-10-05 ENCOUNTER — HOSPITAL ENCOUNTER (OUTPATIENT)
Age: 57
Discharge: HOME | End: 2022-10-05
Payer: COMMERCIAL

## 2022-10-05 DIAGNOSIS — N39.0 COMPLICATED UTI (URINARY TRACT INFECTION): ICD-10-CM

## 2022-10-05 PROCEDURE — 87088 URINE BACTERIA CULTURE: CPT

## 2022-10-07 LAB — URINE CULTURE, ROUTINE: NORMAL

## 2022-10-28 ENCOUNTER — HOSPITAL ENCOUNTER (OUTPATIENT)
Dept: GENERAL RADIOLOGY | Age: 57
Discharge: HOME | End: 2022-10-30
Payer: COMMERCIAL

## 2022-10-28 DIAGNOSIS — N23 RENAL COLIC: ICD-10-CM

## 2022-10-28 PROCEDURE — 74400 UROGRAPHY IV +-KUB TOMOG: CPT

## 2022-10-28 PROCEDURE — 6360000004 HC RX CONTRAST MEDICATION

## 2022-10-28 RX ADMIN — IOPAMIDOL 105.26 ML: 755 INJECTION, SOLUTION INTRAVENOUS at 10:28

## 2022-11-04 ENCOUNTER — PREP FOR PROCEDURE (OUTPATIENT)
Dept: UROLOGY | Age: 57
End: 2022-11-04

## 2022-11-04 RX ORDER — SODIUM CHLORIDE 0.9 % (FLUSH) 0.9 %
5-40 SYRINGE (ML) INJECTION EVERY 12 HOURS SCHEDULED
Status: CANCELLED | OUTPATIENT
Start: 2022-11-04

## 2022-11-04 RX ORDER — SODIUM CHLORIDE 0.9 % (FLUSH) 0.9 %
5-40 SYRINGE (ML) INJECTION PRN
Status: CANCELLED | OUTPATIENT
Start: 2022-11-04

## 2022-11-04 RX ORDER — SODIUM CHLORIDE 9 MG/ML
INJECTION, SOLUTION INTRAVENOUS CONTINUOUS
Status: CANCELLED | OUTPATIENT
Start: 2022-11-04

## 2022-11-04 RX ORDER — SODIUM CHLORIDE 9 MG/ML
INJECTION, SOLUTION INTRAVENOUS PRN
Status: CANCELLED | OUTPATIENT
Start: 2022-11-04

## 2022-11-04 NOTE — PROGRESS NOTES
[address] [facility]                                                                                                                    PRE OP INSTRUCTIONS FOR  Rayne Kumar        Date: 11/4/2022    Date of surgery: 11/7/22   Arrival Time: Hospital will call you between 5pm and 7pm with your final arrival time for surgery    Do not eat or drink anything after midnight prior to surgery. This includes no water, chewing gum, mints or ice chips. Take the following medications with a small sip of water on the morning of Surgery: Omeprazole, Metoprolol and Buspar     Diabetics may take evening dose of insulin but none after midnight. If you feel symptomatic or low blood sugar morning of surgery drink 1-2 ounces of apple juice only. Aspirin, Ibuprofen, Advil, Naproxen, Vitamin E and other Anti-inflammatory products should be stopped  before surgery  as directed by your physician. Take Tylenol only unless instructed otherwise by your surgeon. Check with your Doctor regarding stopping Plavix, Coumadin, Lovenox, Eliquis, Effient, or other blood thinners. Do not smoke,use illicit drugs and do not drink any alcoholic beverages 24 hours prior to surgery. You may brush your teeth the morning of surgery. DO NOT SWALLOW WATER    You MUST make arrangements for a responsible adult to take you home after your surgery. You will not be allowed to leave alone or drive yourself home. It is strongly suggested someone stay with you the first 24 hrs. Your surgery will be cancelled if you do not have a ride home. PEDIATRIC PATIENTS ONLY:  A parent/legal guardian must accompany a child scheduled for surgery and plan to stay at the hospital until the child is discharged. Please do not bring other children with you.     Please wear simple, loose fitting clothing to the hospital.  Noblejames Ludwin not bring valuables (money, credit cards, checkbooks, etc.) Do not wear any makeup (including no eye makeup) or nail polish on your fingers or toes. DO NOT wear any jewelry or piercings on day of surgery. All body piercing jewelry must be removed. Shower the night before surgery with _x__Antibacterial soap /PENG WIPES________    TOTAL JOINT REPLACEMENT/HYSTERECTOMY PATIENTS ONLY---Remember to bring Blood Bank bracelet to the hospital on the day of surgery. If you have a Living Will and Durable Power of  for Healthcare, please bring in a copy. If appropriate bring crutches, inspirex, WALKER, CANE etc... Notify your Surgeon if you develop any illness between now and surgery time, cough, cold, fever, sore throat, nausea, vomiting, etc.  Please notify your surgeon if you experience dizziness, shortness of breath or blurred vision between now & the time of your surgery. If you have ___dentures, they will be removed before going to the OR; we will provide you a container. If you wear ___contact lenses or ___glasses, they will be removed; please bring a case for them. To provide excellent care visitors will be limited to 2 in the room at any given time. Please bring picture ID and insurance card. Sleep apnea patients need to bring CPAP AND SETTINGS to hospital on day of surgery. During flu season no children under the age of 15 are permitted in the hospital for the safety of all patients. Other                   Please call AMBULATORY CARE if you have any further questions.    [facility]     [address]

## 2022-11-06 ENCOUNTER — ANESTHESIA EVENT (OUTPATIENT)
Dept: OPERATING ROOM | Age: 57
End: 2022-11-06
Payer: COMMERCIAL

## 2022-11-07 ENCOUNTER — APPOINTMENT (OUTPATIENT)
Dept: GENERAL RADIOLOGY | Age: 57
End: 2022-11-07
Attending: UROLOGY
Payer: COMMERCIAL

## 2022-11-07 ENCOUNTER — ANESTHESIA (OUTPATIENT)
Dept: OPERATING ROOM | Age: 57
End: 2022-11-07
Payer: COMMERCIAL

## 2022-11-07 ENCOUNTER — HOSPITAL ENCOUNTER (OUTPATIENT)
Age: 57
Setting detail: OUTPATIENT SURGERY
Discharge: HOME | End: 2022-11-07
Attending: UROLOGY | Admitting: UROLOGY
Payer: COMMERCIAL

## 2022-11-07 VITALS
TEMPERATURE: 97 F | OXYGEN SATURATION: 97 % | HEART RATE: 63 BPM | BODY MASS INDEX: 39.51 KG/M2 | HEIGHT: 63 IN | SYSTOLIC BLOOD PRESSURE: 108 MMHG | WEIGHT: 223 LBS | RESPIRATION RATE: 18 BRPM | DIASTOLIC BLOOD PRESSURE: 61 MMHG

## 2022-11-07 DIAGNOSIS — N23 RENAL COLIC: ICD-10-CM

## 2022-11-07 LAB
ANION GAP SERPL CALCULATED.3IONS-SCNC: 10 MMOL/L (ref 7–16)
BUN BLDV-MCNC: 17 MG/DL (ref 6–20)
CALCIUM SERPL-MCNC: 9.4 MG/DL (ref 8.6–10.2)
CHLORIDE BLD-SCNC: 106 MMOL/L (ref 98–107)
CO2: 25 MMOL/L (ref 22–29)
CREAT SERPL-MCNC: 0.7 MG/DL (ref 0.5–1)
GFR SERPL CREATININE-BSD FRML MDRD: >60 ML/MIN/1.73
GLUCOSE BLD-MCNC: 102 MG/DL (ref 74–99)
HCT VFR BLD CALC: 35.5 % (ref 34–48)
HEMOGLOBIN: 11.7 G/DL (ref 11.5–15.5)
MCH RBC QN AUTO: 29.5 PG (ref 26–35)
MCHC RBC AUTO-ENTMCNC: 33 % (ref 32–34.5)
MCV RBC AUTO: 89.4 FL (ref 80–99.9)
PDW BLD-RTO: 14.5 FL (ref 11.5–15)
PLATELET # BLD: 245 E9/L (ref 130–450)
PMV BLD AUTO: 10.1 FL (ref 7–12)
POTASSIUM REFLEX MAGNESIUM: 4 MMOL/L (ref 3.5–5)
RBC # BLD: 3.97 E12/L (ref 3.5–5.5)
SODIUM BLD-SCNC: 141 MMOL/L (ref 132–146)
WBC # BLD: 5.2 E9/L (ref 4.5–11.5)

## 2022-11-07 PROCEDURE — 85027 COMPLETE CBC AUTOMATED: CPT

## 2022-11-07 PROCEDURE — C1758 CATHETER, URETERAL: HCPCS | Performed by: UROLOGY

## 2022-11-07 PROCEDURE — 2580000003 HC RX 258: Performed by: NURSE ANESTHETIST, CERTIFIED REGISTERED

## 2022-11-07 PROCEDURE — 3600000013 HC SURGERY LEVEL 3 ADDTL 15MIN: Performed by: UROLOGY

## 2022-11-07 PROCEDURE — 7100000010 HC PHASE II RECOVERY - FIRST 15 MIN: Performed by: UROLOGY

## 2022-11-07 PROCEDURE — 3600000003 HC SURGERY LEVEL 3 BASE: Performed by: UROLOGY

## 2022-11-07 PROCEDURE — 2580000003 HC RX 258: Performed by: NURSE PRACTITIONER

## 2022-11-07 PROCEDURE — 6360000004 HC RX CONTRAST MEDICATION: Performed by: UROLOGY

## 2022-11-07 PROCEDURE — 36415 COLL VENOUS BLD VENIPUNCTURE: CPT

## 2022-11-07 PROCEDURE — 7100000011 HC PHASE II RECOVERY - ADDTL 15 MIN: Performed by: UROLOGY

## 2022-11-07 PROCEDURE — 2709999900 HC NON-CHARGEABLE SUPPLY: Performed by: UROLOGY

## 2022-11-07 PROCEDURE — 74400 UROGRAPHY IV +-KUB TOMOG: CPT

## 2022-11-07 PROCEDURE — 6360000002 HC RX W HCPCS: Performed by: NURSE ANESTHETIST, CERTIFIED REGISTERED

## 2022-11-07 PROCEDURE — C2617 STENT, NON-COR, TEM W/O DEL: HCPCS | Performed by: UROLOGY

## 2022-11-07 PROCEDURE — 80048 BASIC METABOLIC PNL TOTAL CA: CPT

## 2022-11-07 PROCEDURE — 3700000001 HC ADD 15 MINUTES (ANESTHESIA): Performed by: UROLOGY

## 2022-11-07 PROCEDURE — C1769 GUIDE WIRE: HCPCS | Performed by: UROLOGY

## 2022-11-07 PROCEDURE — 3700000000 HC ANESTHESIA ATTENDED CARE: Performed by: UROLOGY

## 2022-11-07 DEVICE — STENT URET 6FR L24CM PERCFLX HYDR+ DBL PGTL THRD 2: Type: IMPLANTABLE DEVICE | Site: URETER | Status: FUNCTIONAL

## 2022-11-07 RX ORDER — IPRATROPIUM BROMIDE AND ALBUTEROL SULFATE 2.5; .5 MG/3ML; MG/3ML
1 SOLUTION RESPIRATORY (INHALATION)
Status: CANCELLED | OUTPATIENT
Start: 2022-11-07 | End: 2022-11-08

## 2022-11-07 RX ORDER — PROPOFOL 10 MG/ML
INJECTION, EMULSION INTRAVENOUS CONTINUOUS PRN
Status: DISCONTINUED | OUTPATIENT
Start: 2022-11-07 | End: 2022-11-07 | Stop reason: SDUPTHER

## 2022-11-07 RX ORDER — ONDANSETRON 2 MG/ML
INJECTION INTRAMUSCULAR; INTRAVENOUS PRN
Status: DISCONTINUED | OUTPATIENT
Start: 2022-11-07 | End: 2022-11-07 | Stop reason: SDUPTHER

## 2022-11-07 RX ORDER — OXYCODONE HYDROCHLORIDE AND ACETAMINOPHEN 5; 325 MG/1; MG/1
1 TABLET ORAL EVERY 4 HOURS PRN
Qty: 10 TABLET | Refills: 0 | Status: SHIPPED | OUTPATIENT
Start: 2022-11-07 | End: 2022-11-14

## 2022-11-07 RX ORDER — LABETALOL HYDROCHLORIDE 5 MG/ML
10 INJECTION, SOLUTION INTRAVENOUS
Status: CANCELLED | OUTPATIENT
Start: 2022-11-07

## 2022-11-07 RX ORDER — ONDANSETRON 2 MG/ML
4 INJECTION INTRAMUSCULAR; INTRAVENOUS
Status: CANCELLED | OUTPATIENT
Start: 2022-11-07 | End: 2022-11-08

## 2022-11-07 RX ORDER — SODIUM CHLORIDE 9 MG/ML
INJECTION, SOLUTION INTRAVENOUS PRN
Status: DISCONTINUED | OUTPATIENT
Start: 2022-11-07 | End: 2022-11-07 | Stop reason: HOSPADM

## 2022-11-07 RX ORDER — SODIUM CHLORIDE 9 MG/ML
INJECTION, SOLUTION INTRAVENOUS CONTINUOUS PRN
Status: DISCONTINUED | OUTPATIENT
Start: 2022-11-07 | End: 2022-11-07 | Stop reason: SDUPTHER

## 2022-11-07 RX ORDER — FENTANYL CITRATE 50 UG/ML
INJECTION, SOLUTION INTRAMUSCULAR; INTRAVENOUS PRN
Status: DISCONTINUED | OUTPATIENT
Start: 2022-11-07 | End: 2022-11-07 | Stop reason: SDUPTHER

## 2022-11-07 RX ORDER — MORPHINE SULFATE 2 MG/ML
2 INJECTION, SOLUTION INTRAMUSCULAR; INTRAVENOUS EVERY 5 MIN PRN
Status: CANCELLED | OUTPATIENT
Start: 2022-11-07

## 2022-11-07 RX ORDER — NITROFURANTOIN 25; 75 MG/1; MG/1
100 CAPSULE ORAL 2 TIMES DAILY
Qty: 10 CAPSULE | Refills: 0 | Status: SHIPPED | OUTPATIENT
Start: 2022-11-07 | End: 2022-11-12

## 2022-11-07 RX ORDER — SODIUM CHLORIDE 0.9 % (FLUSH) 0.9 %
5-40 SYRINGE (ML) INJECTION PRN
Status: DISCONTINUED | OUTPATIENT
Start: 2022-11-07 | End: 2022-11-07 | Stop reason: HOSPADM

## 2022-11-07 RX ORDER — SODIUM CHLORIDE, SODIUM LACTATE, POTASSIUM CHLORIDE, CALCIUM CHLORIDE 600; 310; 30; 20 MG/100ML; MG/100ML; MG/100ML; MG/100ML
INJECTION, SOLUTION INTRAVENOUS CONTINUOUS PRN
Status: DISCONTINUED | OUTPATIENT
Start: 2022-11-07 | End: 2022-11-07 | Stop reason: SDUPTHER

## 2022-11-07 RX ORDER — FENTANYL CITRATE 50 UG/ML
25 INJECTION, SOLUTION INTRAMUSCULAR; INTRAVENOUS EVERY 5 MIN PRN
Status: CANCELLED | OUTPATIENT
Start: 2022-11-07

## 2022-11-07 RX ORDER — LIDOCAINE HYDROCHLORIDE 20 MG/ML
INJECTION, SOLUTION INTRAVENOUS PRN
Status: DISCONTINUED | OUTPATIENT
Start: 2022-11-07 | End: 2022-11-07 | Stop reason: SDUPTHER

## 2022-11-07 RX ORDER — SODIUM CHLORIDE 9 MG/ML
INJECTION, SOLUTION INTRAVENOUS CONTINUOUS
Status: DISCONTINUED | OUTPATIENT
Start: 2022-11-07 | End: 2022-11-07 | Stop reason: HOSPADM

## 2022-11-07 RX ORDER — SODIUM CHLORIDE 0.9 % (FLUSH) 0.9 %
5-40 SYRINGE (ML) INJECTION EVERY 12 HOURS SCHEDULED
Status: DISCONTINUED | OUTPATIENT
Start: 2022-11-07 | End: 2022-11-07 | Stop reason: HOSPADM

## 2022-11-07 RX ORDER — HYDRALAZINE HYDROCHLORIDE 20 MG/ML
10 INJECTION INTRAMUSCULAR; INTRAVENOUS
Status: CANCELLED | OUTPATIENT
Start: 2022-11-07

## 2022-11-07 RX ORDER — MEPERIDINE HYDROCHLORIDE 25 MG/ML
12.5 INJECTION INTRAMUSCULAR; INTRAVENOUS; SUBCUTANEOUS EVERY 5 MIN PRN
Status: CANCELLED | OUTPATIENT
Start: 2022-11-07

## 2022-11-07 RX ORDER — KETOROLAC TROMETHAMINE 30 MG/ML
30 INJECTION, SOLUTION INTRAMUSCULAR; INTRAVENOUS
Status: CANCELLED | OUTPATIENT
Start: 2022-11-07 | End: 2022-11-08

## 2022-11-07 RX ORDER — DEXAMETHASONE SODIUM PHOSPHATE 4 MG/ML
INJECTION, SOLUTION INTRA-ARTICULAR; INTRALESIONAL; INTRAMUSCULAR; INTRAVENOUS; SOFT TISSUE PRN
Status: DISCONTINUED | OUTPATIENT
Start: 2022-11-07 | End: 2022-11-07 | Stop reason: SDUPTHER

## 2022-11-07 RX ORDER — MIDAZOLAM HYDROCHLORIDE 1 MG/ML
INJECTION INTRAMUSCULAR; INTRAVENOUS PRN
Status: DISCONTINUED | OUTPATIENT
Start: 2022-11-07 | End: 2022-11-07 | Stop reason: SDUPTHER

## 2022-11-07 RX ORDER — PROCHLORPERAZINE EDISYLATE 5 MG/ML
5 INJECTION INTRAMUSCULAR; INTRAVENOUS
Status: CANCELLED | OUTPATIENT
Start: 2022-11-07 | End: 2022-11-08

## 2022-11-07 RX ORDER — MIDAZOLAM HYDROCHLORIDE 1 MG/ML
2 INJECTION INTRAMUSCULAR; INTRAVENOUS
Status: CANCELLED | OUTPATIENT
Start: 2022-11-07 | End: 2022-11-08

## 2022-11-07 RX ORDER — DIPHENHYDRAMINE HYDROCHLORIDE 50 MG/ML
12.5 INJECTION INTRAMUSCULAR; INTRAVENOUS
Status: CANCELLED | OUTPATIENT
Start: 2022-11-07 | End: 2022-11-08

## 2022-11-07 RX ADMIN — FENTANYL CITRATE 50 MCG: 50 INJECTION, SOLUTION INTRAMUSCULAR; INTRAVENOUS at 07:17

## 2022-11-07 RX ADMIN — MIDAZOLAM 2 MG: 1 INJECTION INTRAMUSCULAR; INTRAVENOUS at 07:17

## 2022-11-07 RX ADMIN — ONDANSETRON 4 MG: 2 INJECTION INTRAMUSCULAR; INTRAVENOUS at 07:29

## 2022-11-07 RX ADMIN — FENTANYL CITRATE 50 MCG: 50 INJECTION, SOLUTION INTRAMUSCULAR; INTRAVENOUS at 07:25

## 2022-11-07 RX ADMIN — SODIUM CHLORIDE, POTASSIUM CHLORIDE, SODIUM LACTATE AND CALCIUM CHLORIDE: 600; 310; 30; 20 INJECTION, SOLUTION INTRAVENOUS at 07:44

## 2022-11-07 RX ADMIN — PROPOFOL 120 MCG/KG/MIN: 10 INJECTION, EMULSION INTRAVENOUS at 07:25

## 2022-11-07 RX ADMIN — SODIUM CHLORIDE: 9 INJECTION, SOLUTION INTRAVENOUS at 06:09

## 2022-11-07 RX ADMIN — DEXAMETHASONE SODIUM PHOSPHATE 10 MG: 4 INJECTION, SOLUTION INTRAMUSCULAR; INTRAVENOUS at 07:29

## 2022-11-07 RX ADMIN — LIDOCAINE HYDROCHLORIDE 100 MG: 20 INJECTION, SOLUTION INTRAVENOUS at 07:25

## 2022-11-07 RX ADMIN — SODIUM CHLORIDE: 900 INJECTION, SOLUTION INTRAVENOUS at 07:18

## 2022-11-07 ASSESSMENT — PAIN SCALES - GENERAL
PAINLEVEL_OUTOF10: 3
PAINLEVEL_OUTOF10: 5

## 2022-11-07 ASSESSMENT — PAIN DESCRIPTION - ORIENTATION: ORIENTATION: RIGHT

## 2022-11-07 ASSESSMENT — ENCOUNTER SYMPTOMS
DYSPNEA ACTIVITY LEVEL: AFTER AMBULATING 1 FLIGHT OF STAIRS
SHORTNESS OF BREATH: 0

## 2022-11-07 ASSESSMENT — PAIN DESCRIPTION - LOCATION
LOCATION: VAGINA
LOCATION: ABDOMEN

## 2022-11-07 ASSESSMENT — PAIN DESCRIPTION - DESCRIPTORS: DESCRIPTORS: BURNING

## 2022-11-07 ASSESSMENT — LIFESTYLE VARIABLES: SMOKING_STATUS: 0

## 2022-11-07 NOTE — ANESTHESIA PRE PROCEDURE
Department of Anesthesiology  Preprocedure Note       Name:  Marybeth Devlin   Age:  61 y.o.  :  1965                                          MRN:  [id number]         Date:  2022      Surgeon: Zane Puckett):  Didi ORLADNO Memo, DO    Procedure: Procedure(s):  CYSTOSCOPY RETROGRADE PYELOGRAM  RIGHT STENT INSERTION    Medications prior to admission:   Prior to Admission medications    Medication Sig Start Date End Date Taking? Authorizing Provider   ondansetron (ZOFRAN ODT) 4 MG disintegrating tablet Take 1 tablet by mouth every 8 hours as needed for Nausea or Vomiting May substitute for covered product 22   Bernie Guevara,    HYDROcodone-acetaminophen (NORCO) 5-325 MG per tablet Take 2 tablets by mouth every 6 hours as needed for Pain. Historical Provider, MD PEREZ VITAMIN C 250 MG tablet take 1 tablet by mouth once daily 22   Historical Provider, MD   FEROSUL 325 (65 Fe) MG tablet take 1 tablet by mouth once daily with VITAMIN C 22   Historical Provider, MD   omeprazole (PRILOSEC) 10 MG delayed release capsule take 1 capsule by mouth once daily BEFORE A MEAL 22   Historical Provider, MD   metoprolol succinate (TOPROL XL) 25 MG extended release tablet Take 1 tablet by mouth daily 22   Melonie Cardenas MD   atorvastatin (LIPITOR) 40 MG tablet Take 1 tablet by mouth daily 22   Melonie Cardenas MD   busPIRone (BUSPAR) 10 MG tablet take 1 tablet by mouth twice a day 21   Historical Provider, MD   aspirin (PX ENTERIC ASPIRIN) 81 MG EC tablet Take 1 tablet by mouth daily 20   Melonie Cardenas MD       Current medications:    No current facility-administered medications for this visit. No current outpatient medications on file.      Facility-Administered Medications Ordered in Other Visits   Medication Dose Route Frequency Provider Last Rate Last Admin    0.9 % sodium chloride infusion   IntraVENous Continuous RUFUS Rosen -  mL/hr at 22 5201 New Bag at 22 0359    sodium chloride flush 0.9 % injection 5-40 mL  5-40 mL IntraVENous 2 times per day Major Aliment, APRN - CNP        sodium chloride flush 0.9 % injection 5-40 mL  5-40 mL IntraVENous PRN Major Aliment, APRN - CNP        0.9 % sodium chloride infusion   IntraVENous PRN Major Aliment, APRN - CNP        ceFAZolin (ANCEF) 2,000 mg in sterile water 20 mL IV syringe  2,000 mg IntraVENous On Call to [address], APRN - CNP           Allergies:  No Known Allergies    Problem List:    Patient Active Problem List   Diagnosis Code    Benign neoplasm of ovary 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    Pelvic peritoneal adhesions, female (postoperative) (postinfection) N73.6    Female genital symptoms N94.9    Degenerative arthritis of left knee M17.12    Synovitis of knee M65.9    Medial meniscus tear L S83.249A    Osteophyte of left knee (trochlea and tibial spine) M25.762    Left carpal tunnel syndrome G56.02    CMC arthritis M19.049    [facility] DJD(carpometacarpal degenerative joint disease), localized primary M19.049    Chronic pain G89.29    TIA (transient ischemic attack) G45.9    Fibromyalgia M79.7    Seizure ([address] R56.9    Stenosis of right carotid artery I65.21    Pain syndrome, chronic G89.4    Transient cerebral ischemia G45.9    Palpitations R00.2    Atypical chest pain R07.89    AMARAL (dyspnea on exertion) R06.09    Pure hypercholesterolemia E78.00    Moderate obesity E66.8    PFO (patent foramen ovale) Q21.12    Fatigue R53.83    PVC (premature ventricular contraction) I49.3    S/P percutaneous patent foramen ovale closure Z87.74    Ureterolithiasis T76.6    Complicated UTI (urinary tract infection) N39.0       Past Medical History:        Diagnosis Date    Arthritis arthritis back    and legs,     Back pain arthritis    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    Blood transfusion     Carotid stenosis     Cerebral artery occlusion with cerebral infarction (HCC)     Fibromyalgia     History of blood transfusion     Hyperlipidemia     Lupus ([address]     questionable    Pelvic peritoneal adhesions, female (postoperative) (postinfection) 2012    Right leg pain     Seizure ([address]     last one 30 yrs ago- h/o epilepsy as a child    Spinal headache     Unspecified symptom associated with female genital organs 2012    Ureteral obstruction as a child had surgery to correct        Past Surgical History:        Procedure Laterality Date    ABDOMINAL ADHESION SURGERY  aug 2011    laproscopy lysis of adhesions left ovarian cystotomy    BACK SURGERY      lumbar    BLADDER SURGERY Right 2022    CYSTOSCOPY  RIGHT  STENT INSERTION performed by Mary Ann Preciado DO at [facility]  lt breast lumpectomy    benign    CARDIAC SURGERY      MESH     CARPAL TUNNEL RELEASE Right 2014    CARPAL TUNNEL RELEASE Left 2016     SECTION  2 c sections    FOOT SURGERY  rt foot spur    HYSTERECTOMY (CERVIX STATUS UNKNOWN)  partial hysterectomy    JOINT REPLACEMENT Right pt had 3 knee replacements    KIDNEY SURGERY  ureteral obstruction as child    KNEE ARTHROSCOPY Left 11/05/15    medial menisectomy, chondroplasty, synovectomy, debridement, osteophytes    OTHER SURGICAL HISTORY Right 2017    hand CPC interpositional arthroplasty    OTHER SURGICAL HISTORY Left 2017    left carpal metacarpal arthroplasty    OTHER SURGICAL HISTORY  2020    Dr Jeff Blanco - Amplatzer 25mm PFO occluder - Lot# [id number]    SALPINGO-OOPHORECTOMY  2012    Attempted laparoscopy, open laparotomy, lysis of adhesions, and BSO       Social History:    Social History     Tobacco Use    Smoking status: Never    Smokeless tobacco: Never   Substance Use Topics    Alcohol use: Yes     Comment: occ                                Counseling given: Not Answered      Vital Signs (Current): There were no vitals filed for this visit.                                            BP Readings from Last 3 Encounters:   11/07/22 (!) 108/56   10/05/22 120/80   09/28/22 113/66       NPO Status:  NPO > 8 Hours                                                                               BMI:   Wt Readings from Last 3 Encounters:   11/07/22 223 lb (101.2 kg)   09/26/22 226 lb 12.8 oz (102.9 kg)   09/26/22 229 lb (103.9 kg)     There is no height or weight on file to calculate BMI.    CBC:   Lab Results   Component Value Date/Time    WBC 5.2 11/07/2022 06:06 AM    RBC 3.97 11/07/2022 06:06 AM    HGB 11.7 11/07/2022 06:06 AM    HCT 35.5 11/07/2022 06:06 AM    MCV 89.4 11/07/2022 06:06 AM    RDW 14.5 11/07/2022 06:06 AM     11/07/2022 06:06 AM       CMP:   Lab Results   Component Value Date/Time     09/28/2022 06:50 PM    K 4.1 09/28/2022 06:50 PM    K 4.2 09/15/2022 04:17 AM     09/28/2022 06:50 PM    CO2 26 09/28/2022 06:50 PM    BUN 16 09/28/2022 06:50 PM    CREATININE 0.8 09/28/2022 06:50 PM    GFRAA >60 09/28/2022 06:50 PM    LABGLOM >60 09/28/2022 06:50 PM    GLUCOSE 120 09/28/2022 06:50 PM    GLUCOSE 90 08/11/2011 02:00 PM    PROT 7.7 09/28/2022 06:50 PM    CALCIUM 10.1 09/28/2022 06:50 PM    BILITOT 0.3 09/28/2022 06:50 PM    ALKPHOS 79 09/28/2022 06:50 PM    AST 16 09/28/2022 06:50 PM    ALT 17 09/28/2022 06:50 PM       POC Tests: No results for input(s): POCGLU, POCNA, POCK, POCCL, POCBUN, POCHEMO, POCHCT in the last 72 hours.     Coags:   Lab Results   Component Value Date/Time    PROTIME 10.8 07/22/2020 10:28 AM    PROTIME 11.0 05/18/2011 10:30 AM    INR 1.0 07/22/2020 10:28 AM    APTT 29.4 07/22/2020 10:28 AM       HCG (If Applicable): No results found for: PREGTESTUR, PREGSERUM, HCG, HCGQUANT     ABGs: No results found for: PHART, PO2ART, PLZ2LFN, WKJ4CQN, BEART, V1ZUSWOF     Type & Screen (If Applicable):  No results found for: LABABO, LABRH    Drug/Infectious Status (If Applicable):  No results found for: HIV, HEPCAB    COVID-19 Screening (If Applicable):   Lab Results   Component Value Date/Time    COVID19 Not Detected 07/24/2020 11:00 AM           Anesthesia Evaluation  Patient summary reviewed and Nursing notes reviewed no history of anesthetic complications:   Airway: Mallampati: II  TM distance: >3 FB   Neck ROM: full  Mouth opening: > = 3 FB   Dental:    (+) partials  Comment: Upper partial removed. Patient denies loose. Pulmonary: breath sounds clear to auscultation      (-) shortness of breath and not a current smoker                           Cardiovascular:    (+) hypertension:, AMARAL: after ambulating 1 flight of stairs, hyperlipidemia      ECG reviewed  Rhythm: regular  Rate: normal  Echocardiogram reviewed         Beta Blocker:  Dose within 24 Hrs      ROS comment: hx of PFO S/P percutaneous patent foramen ovale closure July 2020     Neuro/Psych:   (+) seizures (seizures during childhood, no medication currently):, CVA (no residual symptoms):, neuromuscular disease (fibromyalgia):, TIA, headaches (OTC medication for migraines): migraine headaches, depression/anxiety             GI/Hepatic/Renal:   (+) GERD:, renal disease: kidney stones,           Endo/Other:    (+) : arthritis: OA., .                  ROS comment: Lupus Abdominal:   (+) obese,           Vascular:           ROS comment: 50-69% stenosis of the right carotid artery. Other Findings:             Anesthesia Plan      MAC     ASA 3     (General anesthesia backup )  Induction: intravenous. MIPS: Postoperative opioids intended, Prophylactic antiemetics administered and Postoperative trial extubation. Anesthetic plan and risks discussed with patient and spouse. Use of blood products discussed with patient whom consented to blood products. Plan discussed with attending and CRNA. Lisa Chau RN   11/7/2022      DOS STAFF ADDENDUM:    Pt seen and examined, chart reviewed (including anesthesia, drug and allergy history).        Anesthetic plan, risks, benefits, alternatives, and personnel involved discussed with patient. Patient verbalized an understanding and agrees to proceed. Plan discussed with care team members and agreed upon.     Consuelo Lei MD  Staff Anesthesiologist  6:57 AM

## 2022-11-07 NOTE — BRIEF OP NOTE
Brief Postoperative Note      Patient:  Lisa Block  YOB: 1965  MRN: [id number]    Date of Procedure: 11/7/2022    Pre-Op Diagnosis: Hydronephrosis, unspecified hydronephrosis type [N13.30]    Post-Op Diagnosis: Same       Procedure(s):  CYSTOSCOPY RETROGRADE PYELOGRAM  RIGHT STENT INSERTION    Surgeon(s):  Venkatesh Preciado DO    Assistant:  * No surgical staff found *    Anesthesia: General    Estimated Blood Loss (mL): Minimal    Complications: None    Specimens:   * No specimens in log *    Implants:  * No implants in log *      Drains: * No LDAs found *    Findings: see operative report     Electronically signed by Tommy Preciado DO on 11/7/2022 at 7:36 AM

## 2022-11-07 NOTE — DISCHARGE INSTRUCTIONS
Follow up  [facility] in 1-4 week, [phone]     A ureteral stent (also know as a double J stent) was inserted during your recent procedure. Unlike a heart \"stent\" which is metal, short, and permanent, this ureteral stent plastic, and temporary. It spans from your kidney, down the ureter, and into your bladder. This will need to be removed either via a procedure in the office or a string in the next week or two. Sometime these stents are left in for long term drainage, but they need to changed every few months. The instructions will be given to you with regards to removal by Dr. Preciado/Ashlie    IMPORTANT - This ureteral stent will likely cause some frequency with urination, urgency with urination, back/flank pain with urination, and/or blood in the urine. This is very normal.  Taking the pain medications and/or anti-inflammatories will help to manage this discomfort if present. If you have any questions or concerns you can contact Dr. Preciado/Rosendo/Kadeem's office at [phone].

## 2022-11-07 NOTE — H&P
2022 7:32 AM  Service: Urology  Group: [facility] ([facility]/Rosendo/Kadeem)    Carla Moran  [id number]     Chief Complaint: right hydronephrosis     History of Present Illness:   The patient is a 61 y.o. female patient who presents with the above  She did have a laser litho about a month ago at   She did have ureteral injury at age 11  She did have the stent removed  She did see out NP  She remains to flank pain  She did have a GABRIELA  She did have IVP  She does have remain to have right hydronephrosis  Her  is present  All options were discussed     Past Medical History:   Diagnosis Date    Arthritis arthritis back    and legs,     Back pain arthritis    Benign neoplasm of ovary 2012    Blood transfusion     Carotid stenosis     Cerebral artery occlusion with cerebral infarction ([address]     Fibromyalgia     History of blood transfusion     Hyperlipidemia     Lupus ([address]     questionable    Pelvic peritoneal adhesions, female (postoperative) (postinfection) 2012    Right leg pain     Seizure ([address]     last one 30 yrs ago- h/o epilepsy as a child    Spinal headache     Unspecified symptom associated with female genital organs 2012    Ureteral obstruction as a child had surgery to correct        Past Surgical History:   Procedure Laterality Date    ABDOMINAL ADHESION SURGERY  aug 2011    laproscopy lysis of adhesions left ovarian cystotomy    BACK SURGERY  2011    lumbar    BLADDER SURGERY Right 2022    CYSTOSCOPY  RIGHT  STENT INSERTION performed by Garfield Preciado DO at [facility]  lt breast lumpectomy    benign    CARDIAC SURGERY      [facility] Right 03 2014    CARPAL TUNNEL RELEASE Left 2016     SECTION  2 c sections    FOOT SURGERY  rt foot spur    HYSTERECTOMY (CERVIX STATUS UNKNOWN)  partial hysterectomy    JOINT REPLACEMENT Right pt had 3 knee replacements    KIDNEY SURGERY  ureteral obstruction as child    KNEE ARTHROSCOPY Left 11/05/15    medial menisectomy, chondroplasty, synovectomy, debridement, osteophytes    OTHER SURGICAL HISTORY Right 02/17/2017    hand CPC interpositional arthroplasty    OTHER SURGICAL HISTORY Left 09/08/2017    left carpal metacarpal arthroplasty    OTHER SURGICAL HISTORY  07/29/2020    Dr Forrest Gray - Amplatzer 25mm PFO occluder - Lot# [id number]    SALPINGO-OOPHORECTOMY  July 2012    Attempted laparoscopy, open laparotomy, lysis of adhesions, and BSO       Medications Prior to Admission:    Medications Prior to Admission: ondansetron (ZOFRAN ODT) 4 MG disintegrating tablet, Take 1 tablet by mouth every 8 hours as needed for Nausea or Vomiting May substitute for covered product  HYDROcodone-acetaminophen (NORCO) 5-325 MG per tablet, Take 2 tablets by mouth every 6 hours as needed for Pain. RA VITAMIN C 250 MG tablet, take 1 tablet by mouth once daily  FEROSUL 325 (65 Fe) MG tablet, take 1 tablet by mouth once daily with VITAMIN C  omeprazole (PRILOSEC) 10 MG delayed release capsule, take 1 capsule by mouth once daily BEFORE A MEAL  metoprolol succinate (TOPROL XL) 25 MG extended release tablet, Take 1 tablet by mouth daily  atorvastatin (LIPITOR) 40 MG tablet, Take 1 tablet by mouth daily  busPIRone (BUSPAR) 10 MG tablet, take 1 tablet by mouth twice a day  aspirin (PX ENTERIC ASPIRIN) 81 MG EC tablet, Take 1 tablet by mouth daily    Allergies:    Patient has no known allergies. Social History:    reports that she has never smoked. She has never used smokeless tobacco. She reports current alcohol use. She reports that she does not use drugs.     Family History:   Non-contributory to this urological problem  family history includes Alzheimer's Disease in her mother; Breast Cancer in her maternal aunt and mother; COPD in her sister; Cancer in her father; Colon Cancer in her maternal aunt and maternal grandmother; Diabetes in her brother and father; Heart Attack in her mother; No Known Problems in her brother, brother, sister, and sister; Ovarian Cancer in her maternal cousin. Review of Systems:  Respiratory: negative for cough and hemoptysis  Cardiovascular: negative for chest pain and dyspnea  Gastrointestinal: negative for abdominal pain, diarrhea, nausea and vomiting  Derm: negative for rash and skin lesion(s)  Neurological: negative for seizures and tremors  Endocrine: negative for diabetic symptoms including polydipsia and polyuria  : As above in the HPI, otherwise negative  All other reviews are negative    Physical Exam:   Vitals: BP (!) 108/56   Pulse 65   Temp 97.4 °F (36.3 °C) (Temporal)   Resp 16   Ht 5' 3\" (1.6 m)   Wt 223 lb (101.2 kg)   LMP 06/20/2012   SpO2 98%   BMI 39.50 kg/m²   General:  Awake, alert, oriented X 3. Well developed, well nourished, well groomed. No apparent distress. HEENT:  Normocephalic, atraumatic. Pupils equal, round. No scleral icterus. No conjunctival injection. Normal lips, teeth, and gums. No nasal discharge. Neck:  Supple, no masses. Heart:  RRR  Lungs:  No audible wheezing. Respirations symmetric and non-labored. Abdomen:  soft, nontender, no masses, no organomegaly, no peritoneal signs  Extremities:  No clubbing, cyanosis, or edema  Skin:  Warm and dry, no open lesions or rashes  Neuro:  Cranial nerves 2-12 intact, no focal deficits  Rectal: deferred  Genitalia:  Garcia no    Labs:   Recent Labs     11/07/22  0606   WBC 5.2   RBC 3.97   HGB 11.7   HCT 35.5   MCV 89.4   MCH 29.5   MCHC 33.0   RDW 14.5      MPV 10.1       Recent Labs     11/07/22  0606   CREATININE 0.7     . There is persistent right hydronephrosis and right hydroureter. I cannot   exclude a stricture within the distal right ureter at the level of the distal   right sacroiliac joint. Contrast opacified the complete ureter. No stone is   noted. 2. On the postvoid images no significant residual contrast is seen within the   right collecting system.    3. If there is persistent right renal colic direct visualization may be of   benefit.      Images:      Assessment: Guillermina Gold 61 y.o. female     Right flank pain  Right hydronephrosis  Ureteral stone post laser litho in 2022 by 1 month  HO ureteral injury    Plan:    See the outpatient H&P  All options were discussed  The patient family is present  Progress to the OR for C&P, ureteroscopy, right sten insertion   The risks, benefits, and alternatives were discussed  NPO  DVT prophylaxis  Pre-op antibiotics     DO [facility]  Urology

## 2022-11-08 NOTE — OP NOTE
[address]                                OPERATIVE REPORT    PATIENT NAME: Celestino Palafox                  :        1965  MED REC NO:   [id number]                            ROOM:  ACCOUNT NO:   [de-identified]                           ADMIT DATE: 2022  PROVIDER:     Ramya Preciado DO    DATE OF PROCEDURE:  2022    PREOPERATIVE DIAGNOSES:  1. Right hydronephrosis. 2.  History of right ureteral injury. 3.  Right flank pain. 4.  Recent ureteroscopy and laser lithotripsy. POSTOPERATIVE DIAGNOSES:  1. Right hydronephrosis. 2.  History of right ureteral injury. 3.  Right flank pain. 4.  Recent ureteroscopy and laser lithotripsy. No obstruction seen. PROCEDURES:  The patient had:  1. Cystoscopy. 2.  Bilateral retrograde pyelograms done under fluoroscopy. 3.  Ureteroscopy. 4.  Stent insertion. SURGEON:  Venkatesh Preciado DO    ANESTHESIA:  Monitored anesthesia. ESTIMATED BLOOD LOSS:  None. CONDITION:  PACU, stable. Preoperative antibiotics were administered. STORY ON THIS PATIENT:  This is actually a pleasant 57-year   female that I met back in 2022. At that time, she had presented with  pretty significant hydronephrosis and at that time had a stent placed by  myself. Subsequently, she was placed on the schedule for a laser  lithotripsy which I was able to perform. She did have a 9-mm right mid  ureteral stone. I was able to laser the stone out without complication. She did have a stent placed and then subsequently stent was  inadvertently removed. It was on a string. She does have a remote  history of ureteral injury on this right side, which was corrected at  age 11 seen by Dr. Guillermo Vargas. Postoperatively, she presented. She was  having some residual flank pain. Our nurse practitioner [facility] saw her.    A renal ultrasound was performed and it did show persistent right-sided  hydronephrosis. Subsequently, an IVP was done and it also showed right  hydronephrosis. Today, I did see the patient. Her pain has been  intermittent, but improved. Options were discussed with her as well as  her  preoperatively. I did explain the risks, benefits, and  alternatives of the proposed surgical procedure. They understood the  risks, the benefits, and the alternatives and elected to proceed. OPERATIVE PROCEDURE:  This pleasant 57-year  female was brought  to the operating room #1 at [address] [place] [address], placed in the  supine position, induced with monitored anesthesia. Anesthesia  monitored the head and neck area, IV access, and vital signs throughout  the course of the case. Status post induction, the patient was placed  in the dorsal lithotomy position. All underlying points were pressure  padded. SCDs were applied. Prepped and draped in normal sterile  fashion. I proceeded by taking a 21-Romanian Olympus scope with a  30-degree lens and inserted it through the meatus in an atraumatic  fashion. Panendoscopic visualization of the bladder was devoid of any  significant masses, tumors, lesions, or defects. Right and left  ureteral orifices had normal position. I did cannulate the left  ureteral orifice. I shoot retrograde pyelogram under fluoroscopy. The  distal, mid, and proximal portions of the left ureter were devoid of any  significant masses, tumors, lesions, or defects. There was appropriate  cupping of the renal calyces. There was no substantial hydronephrosis. When I shot the right retrograde pyelogram, you could see a little mild  narrowing with some more proximal ureteral dilation. Ultimately, at  this point, I was able to delineate that the hydro in comparison to the  IVP had dramatically improved.   I did place a 0.035 Glidewire, did  semi-rigid ureteroscope, and normal saline under pressure, coursed  easily into the distal mid ureter. I was able to inspect the area and  there was no stricture, scar tissue, stenosis, or residual stone. I was  able to very easily get past the point of where the stone had been  previously. So, at this point, I removed the ureteroscope. I did  insert a 6 x 24 double J-stent in a Seldinger method. Her bladder was  drained. She awoke from anesthesia without complication, brought to  PACU in stable condition. MY PLAN:  1. She can be discharged home today. 2.  She will follow up in the office in two weeks. 3.  I will reassess her pain. 4.  I most likely removed the stent via scope. 5.  I do just think she had residual edema which was resolving post  laser lithotripsy. 6.  Her imaging shows progressive improvement throughout. She tolerated the procedure very well. Findings were conveyed to her  .         [address] DO    D: 11/07/2022 11:22:54       T: 11/07/2022 11:27:10     [email or web address]  Job#: [id number]     Doc#: [id number]    CC:

## 2022-11-08 NOTE — PROGRESS NOTES
CLINICAL PHARMACY NOTE: MEDS TO BEDS    Total # of Prescriptions Filled: 2   The following medications were delivered to the patient:  Percocet 5-325 mg  Nitrofurantoin 100 mg    Additional Documentation:

## 2023-01-21 ENCOUNTER — HOSPITAL ENCOUNTER (EMERGENCY)
Age: 58
Discharge: HOME | End: 2023-01-21
Payer: COMMERCIAL

## 2023-01-21 VITALS
DIASTOLIC BLOOD PRESSURE: 85 MMHG | BODY MASS INDEX: 38.09 KG/M2 | RESPIRATION RATE: 20 BRPM | TEMPERATURE: 99.1 F | SYSTOLIC BLOOD PRESSURE: 123 MMHG | OXYGEN SATURATION: 99 % | WEIGHT: 215 LBS | HEART RATE: 91 BPM | HEIGHT: 63 IN

## 2023-01-21 DIAGNOSIS — M62.838 TRAPEZIUS MUSCLE SPASM: Primary | ICD-10-CM

## 2023-01-21 DIAGNOSIS — R05.9 COUGH, UNSPECIFIED TYPE: ICD-10-CM

## 2023-01-21 PROCEDURE — 99211 OFF/OP EST MAY X REQ PHY/QHP: CPT

## 2023-01-21 PROCEDURE — 96372 THER/PROPH/DIAG INJ SC/IM: CPT

## 2023-01-21 PROCEDURE — 6360000002 HC RX W HCPCS: Performed by: PHYSICIAN ASSISTANT

## 2023-01-21 RX ORDER — TIZANIDINE 4 MG/1
4 TABLET ORAL 2 TIMES DAILY PRN
Qty: 12 TABLET | Refills: 0 | Status: SHIPPED | OUTPATIENT
Start: 2023-01-21

## 2023-01-21 RX ORDER — KETOROLAC TROMETHAMINE 30 MG/ML
30 INJECTION, SOLUTION INTRAMUSCULAR; INTRAVENOUS ONCE
Status: COMPLETED | OUTPATIENT
Start: 2023-01-21 | End: 2023-01-21

## 2023-01-21 RX ORDER — PREDNISONE 20 MG/1
TABLET ORAL
Qty: 10 TABLET | Refills: 0 | Status: SHIPPED | OUTPATIENT
Start: 2023-01-21

## 2023-01-21 RX ORDER — GUAIFENESIN AND DEXTROMETHORPHAN HYDROBROMIDE 1200; 60 MG/1; MG/1
1 TABLET, EXTENDED RELEASE ORAL EVERY 12 HOURS PRN
Qty: 12 TABLET | Refills: 0 | Status: SHIPPED | OUTPATIENT
Start: 2023-01-21

## 2023-01-21 RX ORDER — ORPHENADRINE CITRATE 30 MG/ML
60 INJECTION INTRAMUSCULAR; INTRAVENOUS ONCE
Status: COMPLETED | OUTPATIENT
Start: 2023-01-21 | End: 2023-01-21

## 2023-01-21 RX ORDER — ACETAMINOPHEN 500 MG
1000 TABLET ORAL EVERY 6 HOURS PRN
COMMUNITY

## 2023-01-21 RX ADMIN — ORPHENADRINE CITRATE 60 MG: 30 INJECTION INTRAMUSCULAR; INTRAVENOUS at 14:44

## 2023-01-21 RX ADMIN — KETOROLAC TROMETHAMINE 30 MG: 30 INJECTION, SOLUTION INTRAMUSCULAR; INTRAVENOUS at 14:44

## 2023-01-21 ASSESSMENT — PAIN SCALES - GENERAL
PAINLEVEL_OUTOF10: 5
PAINLEVEL_OUTOF10: 5

## 2023-01-21 ASSESSMENT — PAIN DESCRIPTION - PAIN TYPE
TYPE: ACUTE PAIN
TYPE: ACUTE PAIN

## 2023-01-21 ASSESSMENT — PAIN DESCRIPTION - ONSET
ONSET: GRADUAL
ONSET: GRADUAL

## 2023-01-21 ASSESSMENT — PAIN DESCRIPTION - LOCATION
LOCATION: NECK;THROAT
LOCATION: NECK;THROAT

## 2023-01-21 ASSESSMENT — PAIN DESCRIPTION - FREQUENCY
FREQUENCY: CONTINUOUS
FREQUENCY: CONTINUOUS

## 2023-01-21 ASSESSMENT — PAIN DESCRIPTION - DESCRIPTORS
DESCRIPTORS: SHARP;SPASM;SORE
DESCRIPTORS: SHARP;SPASM;SORE

## 2023-01-21 ASSESSMENT — PAIN - FUNCTIONAL ASSESSMENT: PAIN_FUNCTIONAL_ASSESSMENT: 0-10

## 2023-01-21 NOTE — DISCHARGE INSTRUCTIONS
Take tylenol for pain up to 3000 mg per day. Prednisone for inflammation and pain.   Finish antibiotic  If worse go to ED

## 2023-01-21 NOTE — ED PROVIDER NOTES
[facility]  EMERGENCY DEPARTMENT ENCOUNTER        NAME: Willie Fournier  :  1965  MRN:  [id number]  Date of evaluation: 2023  Provider: Aleksandar Blevins PA-C  PCP: LONNY Kilpatrick  Note Started : 2:14 PM EST 23    Chief Complaint: Neck Pain (Started yesterday. Unable to turn head.), Pharyngitis (States she was diagnoised with strep throat 3 days ago.), and Cough      This is a 50-year-old female the presents to urgent care complaining of sore throat but she states that she has been on an antibiotic for the past couple days for strep throat. She is currently taking that medications but she has noticed some pain on each side of her neck for the last couple days and she feels some discomfort. She denies any numbness or tingling. She denies any other headache. Patient does state in the past she has had some TIA symptoms but she states this is not anything like that she said she was having some facial droop at that time several years ago and she believes that this is different from that. She states the pain on each side of the neck is worse when she moves it. She denies any numbness or tingling or weakness no change in mental status. On first contact patient she appears to be in no acute distress. Review of Systems  Pertinent positives and negatives are stated within HPI, all other systems reviewed and are negative. Allergies: Patient has no known allergies.      --------------------------------------------- PAST HISTORY ---------------------------------------------  Past Medical History:  has a past medical history of Acid reflux, Anxiety, Arthritis, Back pain, Benign neoplasm of ovary, Blood transfusion, Carotid stenosis, Cerebral artery occlusion with cerebral infarction ([address] Fibromyalgia, History of blood transfusion, Hyperlipidemia, Kidney stone, Lupus ([address] Pelvic peritoneal adhesions, female (postoperative) (postinfection), Right leg pain, Seizure [facility]), Spinal headache, Unspecified symptom associated with female genital organs, and Ureteral obstruction. Past Surgical History:  has a past surgical history that includes joint replacement (Right, pt had 3 knee replacements); Breast surgery (lt breast lumpectomy); Hysterectomy (partial hysterectomy); Foot surgery (rt foot spur);  section (2 c sections); Kidney surgery (ureteral obstruction as child); Abdominal adhesion surgery (aug 2011); Salpingo-oophorectomy (2012); back surgery (); Carpal tunnel release (Right, 2014); Knee arthroscopy (Left, 11/05/15); Carpal tunnel release (Left, 2016); other surgical history (Right, 2017); other surgical history (Left, 2017); other surgical history (2020); Cardiac surgery; Bladder surgery (Right, 2022); and Bladder surgery (Right, 2022). Social History:  reports that she has never smoked. She has never used smokeless tobacco. She reports current alcohol use. She reports that she does not use drugs. Family History: family history includes Alzheimer's Disease in her mother; Breast Cancer in her maternal aunt and mother; COPD in her sister; Cancer in her father; Colon Cancer in her maternal aunt and maternal grandmother; Diabetes in her brother and father; Heart Attack in her mother; No Known Problems in her brother, brother, sister, and sister; Ovarian Cancer in her maternal cousin. The patients home medications have been reviewed. The nursing notes within the ED encounter have been reviewed.      ------------------------------------------------SCREENINGS----------------------------------------------                        CIWA Assessment  BP: 123/85  Heart Rate: 91           ---------------------------------------------PHYSICAL EXAM --------------------------------------------    Vitals:    23 1401   BP: 123/85   Pulse: 91   Resp: 20   Temp: 99.1 °F (37.3 °C)   TempSrc: Infrared   SpO2: 99% Weight: 215 lb (97.5 kg)   Height: 5' 3\" (1.6 m)     Oxygen Saturation Interpretation: Normal     Physical Exam  Vitals and nursing note reviewed. Constitutional:       Appearance: She is well-developed. HENT:      Head: Normocephalic and atraumatic. Jaw: There is normal jaw occlusion. Right Ear: Hearing, tympanic membrane, ear canal and external ear normal.      Left Ear: Hearing, tympanic membrane, ear canal and external ear normal.      Nose: Nose normal. No congestion or rhinorrhea. Right Sinus: No maxillary sinus tenderness or frontal sinus tenderness. Left Sinus: No maxillary sinus tenderness or frontal sinus tenderness. Mouth/Throat:      Mouth: Mucous membranes are moist. No angioedema. Pharynx: Uvula midline. Pharyngeal swelling and posterior oropharyngeal erythema present. No oropharyngeal exudate or uvula swelling. Tonsils: No tonsillar exudate or tonsillar abscesses. Comments: Oropharynx is patent. Eyes:      General: Lids are normal.      Conjunctiva/sclera: Conjunctivae normal.      Pupils: Pupils are equal, round, and reactive to light. Cardiovascular:      Rate and Rhythm: Normal rate and regular rhythm. Heart sounds: Normal heart sounds. No murmur heard. Pulmonary:      Effort: Pulmonary effort is normal.      Breath sounds: Normal breath sounds. Abdominal:      General: Bowel sounds are normal.      Palpations: Abdomen is soft. Abdomen is not rigid. Tenderness: There is no abdominal tenderness. There is no guarding or rebound. Musculoskeletal:      Cervical back: Neck supple. Pain with movement and muscular tenderness present. No spinous process tenderness. Decreased range of motion. Comments: Also strength bilaterally 5 out of 5 reflexes are present. Lymphadenopathy:      Cervical: No cervical adenopathy. Skin:     General: Skin is warm and dry. Findings: No abrasion or rash.    Neurological:      General: No focal deficit present. Mental Status: She is alert and oriented to person, place, and time. GCS: GCS eye subscore is 4. GCS verbal subscore is 5. GCS motor subscore is 6. Cranial Nerves: No cranial nerve deficit. Sensory: No sensory deficit. Coordination: Coordination normal.      Gait: Gait normal.       -------------------------------------------------- RESULTS -------------------------------------------------  No results found for this visit on 01/21/23. No orders to display       Labs Reviewed - No data to display    When ordered only abnormal lab results are displayed. All other labs were within normal range or not returned as of this dictation. Interpretation per the Radiologist below, if available at the time of this note:    No orders to display     No results found. No results found.     -------------------------------------------------PROCEDURES--------------------------------------------  Unless otherwise noted below, none      Procedures      ---EMERGENCY DEPARTMENT COURSE and DIFFERENTIAL DIAGNOSIS/MDM---  (CC/HPI Summary, DDx, ED Course, and Reassessment:) (Disposition Considerations (include 1 Tests not done, Admit vs D/C, Shared Decision Making, Pt Expectation of Test or Tx., Consults, Social Determinants, Chronic Conditiions, Records reviewed)    MDM  Number of Diagnoses or Management Options  Cough, unspecified type  Trapezius muscle spasm  Diagnosis management comments: Patient in no acute distress. She does not look septic. She does have some soft tissue tenderness to each side of the neck she did have some relief with some of the medication I gave her here I will place her on medication for home. Place her on a steroid muscle relaxer also she did state that she had a cough her lungs were clear here I will place her on a a cough and cold medication. Otherwise she is neurologically intact. I told her if worse go to the ER.          DISCHARGE MEDICATIONS:  Discharge Medication List as of 1/21/2023  3:23 PM        START taking these medications    Details   predniSONE (DELTASONE) 20 MG tablet Take 2 tablets by mouth daily x 5 days. , Disp-10 tablet, R-0Normal      tiZANidine (ZANAFLEX) 4 MG tablet Take 1 tablet by mouth 2 times daily as needed (pain/spasm (may cause drowsiness)), Disp-12 tablet, R-0Normal      Dextromethorphan-guaiFENesin (MUCINEX DM MAXIMUM STRENGTH)  MG TB12 Take 1 tablet by mouth every 12 hours as needed (cough), Disp-12 tablet, R-0Normal             DISCONTINUED MEDICATIONS:  Discharge Medication List as of 1/21/2023  3:23 PM          PATIENT REFERRED TO:  Christina Ron, [address] [place], [place] [id number] [phone]    Schedule an appointment as soon as possible for a visit       --------------------------------- ADDITIONAL PROVIDER NOTES ---------------------------------  I have spoken with the patient and discussed todays results, in addition to providing specific details for the plan of care and counseling regarding the diagnosis and prognosis. Their questions are answered at this time and they are agreeable with the plan. This patient is stable for discharge. I have shared the specific conditions for return, as well as the importance of follow-up. * NOTE: (Please note that portions of this note were completed with a voice recognition program.  Efforts were made to edit the dictations but occasionally words are mis-transcribed. )    --------------------------------- IMPRESSION AND DISPOSITION ---------------------------------    IMPRESSION  1. Trapezius muscle spasm    2. Cough, unspecified type        DISPOSITION Decision To Discharge 01/21/2023 03:21:35 PM    Disposition: Discharge to home  Patient condition is good    I am the Primary Clinician of Record.      Flo Barker PA-C (electronically signed) on 1/21/2023 at 5:00 PM        Flo Barker PA-C  01/21/23 170

## 2023-02-10 NOTE — ANESTHESIA PRE PROCEDURE
normal appearance Allergies    Problem List:    Patient Active Problem List   Diagnosis Code    Benign neoplasm of ovary D27.9    Pelvic peritoneal adhesions, female (postoperative) (postinfection) N73.6    Female genital symptoms N94.9    Degenerative arthritis of left knee M17.12    Synovitis of knee M65.9    Medial meniscus tear L S83.249A    Osteophyte of left knee (trochlea and tibial spine) M25.762    Left carpal tunnel syndrome G56.02    CMC arthritis M19.049    [facility] DJD(carpometacarpal degenerative joint disease), localized primary M19.049    Chronic pain G89.29    TIA (transient ischemic attack) G45.9    Fibromyalgia M79.7    Seizure ([address] R56.9    Stenosis of right carotid artery I65.21    Pain syndrome, chronic G89.4    Transient cerebral ischemia G45.9    Palpitations R00.2    Atypical chest pain R07.89    Exertional dyspnea R06.09    Pure hypercholesterolemia E78.00    Moderate obesity E66.8       Past Medical History:        Diagnosis Date    Arthritis arthritis back    and legs,     Back pain arthritis    Benign neoplasm of ovary 2012    Blood transfusion     Carotid stenosis     Cerebral artery occlusion with cerebral infarction ([address]     Fibromyalgia     History of blood transfusion     Hyperlipidemia     Lupus (HCC)     questionable    Pelvic peritoneal adhesions, female (postoperative) (postinfection) 2012    Right leg pain     Seizure ([address]     last one 30 yrs ago- h/o epilepsy as a child    Unspecified symptom associated with female genital organs 2012    Ureteral obstruction as a child had surgery to correct        Past Surgical History:        Procedure Laterality Date    ABDOMINAL ADHESION SURGERY  aug 2011    laproscopy lysis of adhesions left ovarian cystotomy    BACK SURGERY      lumbar    BREAST SURGERY  lt breast lumpectomy    benign    CARPAL TUNNEL RELEASE Right 2014    CARPAL TUNNEL RELEASE Left 2016     SECTION  2 c sections    FOOT SURGERY  rt foot spur    HYSTERECTOMY  partial hysterectomy    JOINT REPLACEMENT Right pt had 3 knee replacements    KIDNEY SURGERY  ureteral obstruction as child    KNEE ARTHROSCOPY Left 11/05/15    medial menisectomy, chondroplasty, synovectomy, debridement, osteophytes    OTHER SURGICAL HISTORY Right 02/17/2017    hand CPC interpositional arthroplasty    OTHER SURGICAL HISTORY Left 09/08/2017    left carpal metacarpal arthroplasty    SALPINGO-OOPHORECTOMY  July 2012    Attempted laparoscopy, open laparotomy, lysis of adhesions, and BSO       Social History:    Social History     Tobacco Use    Smoking status: Never Smoker    Smokeless tobacco: Never Used   Substance Use Topics    Alcohol use: Yes     Frequency: 2-4 times a month     Drinks per session: 1 or 2     Binge frequency: Never                                Counseling given: Not Answered      Vital Signs (Current): There were no vitals filed for this visit.                                            BP Readings from Last 3 Encounters:   06/30/20 128/80   02/14/20 (!) 82/50   11/09/19 103/63     6/30/20 EKG  Sinus  Rhythm      NORMAL    NPO Status:  > 8 hours                                                                               BMI:   Wt Readings from Last 3 Encounters:   06/30/20 205 lb 9.6 oz (93.3 kg)   02/14/20 210 lb (95.3 kg)   11/07/19 196 lb 12.8 oz (89.3 kg)     There is no height or weight on file to calculate BMI.    CBC:   Lab Results   Component Value Date    WBC 4.4 11/09/2019    RBC 3.54 11/09/2019    HGB 10.5 11/09/2019    HCT 32.3 11/09/2019    MCV 91.2 11/09/2019    RDW 14.2 11/09/2019     11/09/2019       CMP:   Lab Results   Component Value Date     11/09/2019    K 4.2 11/09/2019     11/09/2019    CO2 26 11/09/2019    BUN 16 11/09/2019    CREATININE 0.7 11/09/2019    GFRAA >60 11/09/2019    LABGLOM >60 11/09/2019    GLUCOSE 89 11/09/2019    GLUCOSE 90 08/11/2011    PROT 6.5

## 2023-08-01 ENCOUNTER — OFFICE VISIT (OUTPATIENT)
Dept: CARDIOLOGY CLINIC | Age: 58
End: 2023-08-01
Payer: COMMERCIAL

## 2023-08-01 VITALS
SYSTOLIC BLOOD PRESSURE: 110 MMHG | TEMPERATURE: 97.9 F | HEART RATE: 72 BPM | DIASTOLIC BLOOD PRESSURE: 64 MMHG | HEIGHT: 62 IN | BODY MASS INDEX: 37.13 KG/M2 | WEIGHT: 201.8 LBS

## 2023-08-01 DIAGNOSIS — Z87.74 STATUS POST PATENT FORAMEN OVALE CLOSURE: ICD-10-CM

## 2023-08-01 DIAGNOSIS — E78.5 HYPERLIPIDEMIA, UNSPECIFIED HYPERLIPIDEMIA TYPE: ICD-10-CM

## 2023-08-01 DIAGNOSIS — Z87.74 S/P PERCUTANEOUS PATENT FORAMEN OVALE CLOSURE: Primary | ICD-10-CM

## 2023-08-01 PROCEDURE — G8417 CALC BMI ABV UP PARAM F/U: HCPCS | Performed by: PHYSICIAN ASSISTANT

## 2023-08-01 PROCEDURE — 99214 OFFICE O/P EST MOD 30 MIN: CPT | Performed by: PHYSICIAN ASSISTANT

## 2023-08-01 PROCEDURE — 3017F COLORECTAL CA SCREEN DOC REV: CPT | Performed by: PHYSICIAN ASSISTANT

## 2023-08-01 PROCEDURE — G8427 DOCREV CUR MEDS BY ELIG CLIN: HCPCS | Performed by: PHYSICIAN ASSISTANT

## 2023-08-01 PROCEDURE — 93000 ELECTROCARDIOGRAM COMPLETE: CPT | Performed by: INTERNAL MEDICINE

## 2023-08-01 PROCEDURE — 1036F TOBACCO NON-USER: CPT | Performed by: PHYSICIAN ASSISTANT

## 2023-08-01 NOTE — PROGRESS NOTES
there were no ST segment changes of significance at the heart rate achieved. IMAGING: Myocardial perfusion imaging was performed at rest 30-35 minutes following the intravenous injection of 8.2 mCi of (Tc-tetrofosmin) followed by 10 ml of Normal Saline. At peak exercise, the patient was injected intravenously with 28.3 mCi of (Tc-tetrofosmin) followed by 10 ml of Normal Saline. Gated post-stress tomographic imaging was performed 20-25 minutes after stress. FINDINGS: The overall quality of the study was good. Left ventricular cavity size was noted to be normal.     Rotational analog analysis demonstrated abnormal extracardiac radioactivity and soft tissue breast attenuation. The gated SPECT stress imaging in the short, vertical long, and horizontal long axis demonstrated normal homogeneous tracer distribution throughout the myocardium both on the post stress and resting images. The resting images are show no change. Gated SPECT left ventricular ejection fraction was calculated to be 72%, with normal myocardial thickening and wall motion. Impression:    Exercise EKG was normal.   The patient experienced no chest pain with exercise. The myocardial perfusion imaging was normal.    Overall left ventricular systolic function was normal without regional wall motion abnormalities. Exercise capacity was average. 6. Low risk general exercise nuclear stress test.       Echocardiogram done on 9/16/2022:  No Echo indication of Endocarditis. Normal left ventricular chamber size. Normal left ventricular systolic function, EF 22%. Mild left ventricular concentric hypertrophy noted. Stage II diastolic dysfunction. Left atrium is enlarged. Increased left atrial volume. PFO closure device noted. Hx of PFO closure with 25-mm Amplatzer occluder. Doppler interrogation showed possible small left to right interatrial shunt.   Agitated saline injection showed some right to left

## 2023-08-02 ENCOUNTER — HOSPITAL ENCOUNTER (OUTPATIENT)
Dept: MAMMOGRAPHY | Age: 58
Discharge: HOME | End: 2023-08-04
Payer: COMMERCIAL

## 2023-08-02 VITALS — HEIGHT: 62 IN | BODY MASS INDEX: 36.99 KG/M2 | WEIGHT: 201 LBS

## 2023-08-02 DIAGNOSIS — Z12.31 SCREENING MAMMOGRAM, ENCOUNTER FOR: ICD-10-CM

## 2023-08-02 PROCEDURE — 77063 BREAST TOMOSYNTHESIS BI: CPT

## 2023-09-20 DIAGNOSIS — Z87.74 S/P PERCUTANEOUS PATENT FORAMEN OVALE CLOSURE: ICD-10-CM

## 2023-09-21 RX ORDER — ATORVASTATIN CALCIUM 40 MG/1
40 TABLET, FILM COATED ORAL DAILY
Qty: 90 TABLET | Refills: 1 | Status: SHIPPED | OUTPATIENT
Start: 2023-09-21

## 2023-09-21 RX ORDER — METOPROLOL SUCCINATE 25 MG/1
25 TABLET, EXTENDED RELEASE ORAL DAILY
Qty: 90 TABLET | Refills: 1 | Status: SHIPPED | OUTPATIENT
Start: 2023-09-21

## 2023-10-11 ENCOUNTER — TELEPHONE (OUTPATIENT)
Dept: CARDIOLOGY | Age: 58
End: 2023-10-11

## 2023-10-11 NOTE — TELEPHONE ENCOUNTER
CALLED PT TO RESCHEDULE AN ECHO APPT ON 12-15-23 DUE TO NOT HAVING A TECHNOLOGIST  IN OFFICE. LEFT MESSAGE TO CALL BACK TO ADJUST APPT.

## 2023-11-25 ENCOUNTER — HOSPITAL ENCOUNTER (OUTPATIENT)
Age: 58
Discharge: HOME | End: 2023-11-25
Payer: COMMERCIAL

## 2023-11-25 DIAGNOSIS — Z87.74 STATUS POST PATENT FORAMEN OVALE CLOSURE: ICD-10-CM

## 2023-11-25 DIAGNOSIS — E78.5 HYPERLIPIDEMIA, UNSPECIFIED HYPERLIPIDEMIA TYPE: ICD-10-CM

## 2023-11-25 DIAGNOSIS — Z87.74 S/P PERCUTANEOUS PATENT FORAMEN OVALE CLOSURE: ICD-10-CM

## 2023-11-25 LAB
ANION GAP SERPL CALCULATED.3IONS-SCNC: 9 MMOL/L (ref 7–16)
BUN SERPL-MCNC: 17 MG/DL (ref 6–20)
CALCIUM SERPL-MCNC: 9.7 MG/DL (ref 8.6–10.2)
CHLORIDE SERPL-SCNC: 98 MMOL/L (ref 98–107)
CHOLEST SERPL-MCNC: 144 MG/DL
CO2 SERPL-SCNC: 29 MMOL/L (ref 22–29)
CREAT SERPL-MCNC: 0.7 MG/DL (ref 0.5–1)
ERYTHROCYTE [DISTWIDTH] IN BLOOD BY AUTOMATED COUNT: 14.6 % (ref 11.5–15)
GFR SERPL CREATININE-BSD FRML MDRD: >60 ML/MIN/1.73M2
GLUCOSE SERPL-MCNC: 85 MG/DL (ref 74–99)
HCT VFR BLD AUTO: 36.9 % (ref 34–48)
HDLC SERPL-MCNC: 58 MG/DL
HGB BLD-MCNC: 12.2 G/DL (ref 11.5–15.5)
LDLC SERPL CALC-MCNC: 70 MG/DL
MCH RBC QN AUTO: 29.6 PG (ref 26–35)
MCHC RBC AUTO-ENTMCNC: 33.1 G/DL (ref 32–34.5)
MCV RBC AUTO: 89.6 FL (ref 80–99.9)
PLATELET # BLD AUTO: 353 K/UL (ref 130–450)
PMV BLD AUTO: 9.3 FL (ref 7–12)
POTASSIUM SERPL-SCNC: 4 MMOL/L (ref 3.5–5)
RBC # BLD AUTO: 4.12 M/UL (ref 3.5–5.5)
SODIUM SERPL-SCNC: 136 MMOL/L (ref 132–146)
TRIGL SERPL-MCNC: 80 MG/DL
VLDLC SERPL CALC-MCNC: 16 MG/DL
WBC OTHER # BLD: 6.5 K/UL (ref 4.5–11.5)

## 2023-11-25 PROCEDURE — 80061 LIPID PANEL: CPT

## 2023-11-25 PROCEDURE — 85027 COMPLETE CBC AUTOMATED: CPT

## 2023-11-25 PROCEDURE — 80048 BASIC METABOLIC PNL TOTAL CA: CPT

## 2023-11-25 PROCEDURE — 36415 COLL VENOUS BLD VENIPUNCTURE: CPT

## 2023-11-30 ENCOUNTER — TELEPHONE (OUTPATIENT)
Dept: CARDIOLOGY | Age: 58
End: 2023-11-30

## 2023-11-30 NOTE — TELEPHONE ENCOUNTER
Patient called to cancel Echo 12/4 d/t inability to get off work. Patient calling hospital to re-schedule d/t how far out Cardio offices scheduling. Patient will call back if needed to re-schedule at [facility].     Asif

## 2024-01-11 NOTE — PATIENT INSTRUCTIONS
We will schedule an echocardiogram to follow up on PFO closure - Bailey will call with results    Follow up with Dr. Dale Flores in one year; call sooner if concerns arise in the meantime    Follow up with Dr. Ranjeet Fairbanks as scheduled    Please fast for your blood work - Denise Love will call with results
11-Jan-2024 13:25

## 2024-01-12 ENCOUNTER — TELEPHONE (OUTPATIENT)
Dept: ORTHOPEDIC SURGERY | Age: 59
End: 2024-01-12

## 2024-01-12 DIAGNOSIS — Z79.2 PROPHYLACTIC ANTIBIOTIC: Primary | ICD-10-CM

## 2024-01-12 RX ORDER — CEPHALEXIN 500 MG/1
2000 CAPSULE ORAL DAILY PRN
Qty: 4 CAPSULE | Refills: 3 | Status: SHIPPED | OUTPATIENT
Start: 2024-01-12

## 2024-01-23 ENCOUNTER — TELEPHONE (OUTPATIENT)
Dept: CARDIOLOGY CLINIC | Age: 59
End: 2024-01-23

## 2024-01-23 DIAGNOSIS — R00.2 PALPITATIONS: Primary | ICD-10-CM

## 2024-01-23 NOTE — TELEPHONE ENCOUNTER
DR. SURINDER NATHAN ( ) CALLED ABOUT RANDOM HEART RACING.    ACCORDING TO HER WATCH, HER HEART RAT CAN GO -130 JUST SITTING, SOMETIMES HIGHER.    PLEASE ADVISE.  JENNIFER

## 2024-01-23 NOTE — TELEPHONE ENCOUNTER
She needs to have 2 weeks ZIO XT monitor.  Order was placed.  She might need to go to the emergency room she is symptomatic.

## 2024-01-26 ENCOUNTER — TELEPHONE (OUTPATIENT)
Dept: ADMINISTRATIVE | Age: 59
End: 2024-01-26

## 2024-01-26 NOTE — TELEPHONE ENCOUNTER
Please advise rescheduling  patient lab visit for Dr. Cardoso due to work conflict. Spouse Sebastien requesting call back at [phone]

## 2024-01-31 ENCOUNTER — HOSPITAL ENCOUNTER (OUTPATIENT)
Dept: CARDIOLOGY | Age: 59
Discharge: HOME | End: 2024-02-02
Payer: COMMERCIAL

## 2024-01-31 ENCOUNTER — NURSE ONLY (OUTPATIENT)
Dept: CARDIOLOGY CLINIC | Age: 59
End: 2024-01-31

## 2024-01-31 VITALS
HEIGHT: 62 IN | BODY MASS INDEX: 36.99 KG/M2 | DIASTOLIC BLOOD PRESSURE: 64 MMHG | WEIGHT: 201 LBS | SYSTOLIC BLOOD PRESSURE: 110 MMHG

## 2024-01-31 DIAGNOSIS — Z87.74 S/P PERCUTANEOUS PATENT FORAMEN OVALE CLOSURE: ICD-10-CM

## 2024-01-31 DIAGNOSIS — E78.5 HYPERLIPIDEMIA, UNSPECIFIED HYPERLIPIDEMIA TYPE: ICD-10-CM

## 2024-01-31 DIAGNOSIS — Z87.74 STATUS POST PATENT FORAMEN OVALE CLOSURE: ICD-10-CM

## 2024-01-31 LAB
ECHO AO ASC DIAM: 2.8 CM
ECHO AO ASCENDING AORTA INDEX: 1.46 CM/M2
ECHO AV AREA PEAK VELOCITY: 2 CM2
ECHO AV AREA VTI: 2.1 CM2
ECHO AV AREA/BSA PEAK VELOCITY: 1 CM2/M2
ECHO AV AREA/BSA VTI: 1.1 CM2/M2
ECHO AV CUSP MM: 2.1 CM
ECHO AV MEAN GRADIENT: 3 MMHG
ECHO AV MEAN VELOCITY: 0.8 M/S
ECHO AV PEAK GRADIENT: 6 MMHG
ECHO AV PEAK VELOCITY: 1.2 M/S
ECHO AV VELOCITY RATIO: 0.67
ECHO AV VTI: 29.5 CM
ECHO BSA: 2 M2
ECHO EST RA PRESSURE: 3 MMHG
ECHO LA DIAMETER INDEX: 2.03 CM/M2
ECHO LA DIAMETER: 3.9 CM
ECHO LA VOL A-L A2C: 72 ML (ref 22–52)
ECHO LA VOL A-L A4C: 62 ML (ref 22–52)
ECHO LA VOL MOD A2C: 70 ML (ref 22–52)
ECHO LA VOL MOD A4C: 59 ML (ref 22–52)
ECHO LA VOLUME AREA LENGTH: 68 ML
ECHO LA VOLUME INDEX A-L A2C: 38 ML/M2 (ref 16–34)
ECHO LA VOLUME INDEX A-L A4C: 32 ML/M2 (ref 16–34)
ECHO LA VOLUME INDEX AREA LENGTH: 35 ML/M2 (ref 16–34)
ECHO LA VOLUME INDEX MOD A2C: 36 ML/M2 (ref 16–34)
ECHO LA VOLUME INDEX MOD A4C: 31 ML/M2 (ref 16–34)
ECHO LV FRACTIONAL SHORTENING: 29 % (ref 28–44)
ECHO LV INTERNAL DIMENSION DIASTOLE INDEX: 2.66 CM/M2
ECHO LV INTERNAL DIMENSION DIASTOLIC: 5.1 CM (ref 3.9–5.3)
ECHO LV INTERNAL DIMENSION SYSTOLIC INDEX: 1.88 CM/M2
ECHO LV INTERNAL DIMENSION SYSTOLIC: 3.6 CM
ECHO LV IVSD: 1.2 CM (ref 0.6–0.9)
ECHO LV IVSS: 1.3 CM
ECHO LV MASS 2D: 227.4 G (ref 67–162)
ECHO LV MASS INDEX 2D: 118.4 G/M2 (ref 43–95)
ECHO LV POSTERIOR WALL DIASTOLIC: 1.1 CM (ref 0.6–0.9)
ECHO LV POSTERIOR WALL SYSTOLIC: 1.6 CM
ECHO LV RELATIVE WALL THICKNESS RATIO: 0.43
ECHO LVOT AREA: 3.1 CM2
ECHO LVOT AV VTI INDEX: 0.65
ECHO LVOT DIAM: 2 CM
ECHO LVOT MEAN GRADIENT: 1 MMHG
ECHO LVOT PEAK GRADIENT: 2 MMHG
ECHO LVOT PEAK VELOCITY: 0.8 M/S
ECHO LVOT STROKE VOLUME INDEX: 31.2 ML/M2
ECHO LVOT SV: 60 ML
ECHO LVOT VTI: 19.1 CM
ECHO MV "A" WAVE DURATION: 92.3 MSEC
ECHO MV A VELOCITY: 0.71 M/S
ECHO MV AREA PHT: 3.2 CM2
ECHO MV AREA VTI: 2 CM2
ECHO MV E DECELERATION TIME (DT): 191.5 MS
ECHO MV E VELOCITY: 0.9 M/S
ECHO MV E/A RATIO: 1.27
ECHO MV LVOT VTI INDEX: 1.53
ECHO MV MAX VELOCITY: 1 M/S
ECHO MV MEAN GRADIENT: 1 MMHG
ECHO MV MEAN VELOCITY: 0.4 M/S
ECHO MV PEAK GRADIENT: 4 MMHG
ECHO MV PRESSURE HALF TIME (PHT): 69.2 MS
ECHO MV VTI: 29.3 CM
ECHO PV MAX VELOCITY: 0.9 M/S
ECHO PV MEAN GRADIENT: 2 MMHG
ECHO PV MEAN VELOCITY: 0.6 M/S
ECHO PV PEAK GRADIENT: 3 MMHG
ECHO PV VTI: 21.9 CM
ECHO PVEIN A DURATION: 129.2 MS
ECHO PVEIN A VELOCITY: 0.3 M/S
ECHO PVEIN PEAK D VELOCITY: 0.3 M/S
ECHO PVEIN PEAK S VELOCITY: 0.4 M/S
ECHO PVEIN S/D RATIO: 1.3
ECHO RIGHT VENTRICULAR SYSTOLIC PRESSURE (RVSP): 22 MMHG
ECHO RV INTERNAL DIMENSION: 3 CM
ECHO RV TAPSE: 2.5 CM (ref 1.7–?)
ECHO TV REGURGITANT MAX VELOCITY: 2.18 M/S
ECHO TV REGURGITANT PEAK GRADIENT: 19 MMHG

## 2024-01-31 PROCEDURE — 93306 TTE W/DOPPLER COMPLETE: CPT | Performed by: INTERNAL MEDICINE

## 2024-01-31 PROCEDURE — 93306 TTE W/DOPPLER COMPLETE: CPT

## 2024-01-31 PROCEDURE — 2580000003 HC RX 258: Performed by: INTERNAL MEDICINE

## 2024-01-31 RX ORDER — SODIUM CHLORIDE 0.9 % (FLUSH) 0.9 %
10 SYRINGE (ML) INJECTION PRN
Status: DISCONTINUED | OUTPATIENT
Start: 2024-01-31 | End: 2024-02-03 | Stop reason: HOSPADM

## 2024-01-31 RX ADMIN — SODIUM CHLORIDE, PRESERVATIVE FREE 10 ML: 5 INJECTION INTRAVENOUS at 14:52

## 2024-01-31 RX ADMIN — SODIUM CHLORIDE, PRESERVATIVE FREE 10 ML: 5 INJECTION INTRAVENOUS at 14:53

## 2024-01-31 RX ADMIN — SODIUM CHLORIDE, PRESERVATIVE FREE 10 ML: 5 INJECTION INTRAVENOUS at 14:51

## 2024-01-31 NOTE — PROGRESS NOTES
Patient was seen today and a 14 day ZIO XT  monitor was placed. Monitor was ordered by  . The monitor was applied, instructions were given to the patient. Patient stated understanding and gave verbalize readback.  Monitor company:[employer]  Serial number: [id number]

## 2024-02-05 ENCOUNTER — TELEPHONE (OUTPATIENT)
Dept: CARDIOLOGY CLINIC | Age: 59
End: 2024-02-05

## 2024-02-06 ENCOUNTER — TELEPHONE (OUTPATIENT)
Dept: CARDIOLOGY CLINIC | Age: 59
End: 2024-02-06

## 2024-02-06 NOTE — TELEPHONE ENCOUNTER
Please inform patient that her echocardiogram was within normal limits.  There was absence of interatrial shunting across the closed anaya ovale.  She has no valvular heart disease and her ejection fraction was 55 to 60%

## 2024-02-26 ENCOUNTER — TELEPHONE (OUTPATIENT)
Dept: CARDIOLOGY CLINIC | Age: 59
End: 2024-02-26

## 2024-02-26 NOTE — TELEPHONE ENCOUNTER
Dr. Cardoso,     Patient called about results from her Event Monitor?    Please Advise.   JENNIFER

## 2024-03-04 ENCOUNTER — TELEPHONE (OUTPATIENT)
Dept: CARDIOLOGY CLINIC | Age: 59
End: 2024-03-04

## 2024-03-06 ENCOUNTER — TELEPHONE (OUTPATIENT)
Dept: CARDIOLOGY CLINIC | Age: 59
End: 2024-03-06

## 2024-03-06 NOTE — TELEPHONE ENCOUNTER
Called patient and reviewed results of Zio monitor. All questions were answered and patient verbalized understanding.

## 2024-03-13 NOTE — PROGRESS NOTES
[facility]   Heart and Vascular [place]   Clinic Note     Date:7/22/20   Patient Name:Quynh Gold  YOB: 1965  Age: 53 y.o. Primary Care Provider: LONNY Calix     Referring physician: Lue Denver, MD (cardiology)    Subjective   This is a 53-year-old  female, referred to us by Dr. Brett Bond  for consideration of percutaneous PFO closure to reduce the risk of recurrent cryptogenic stroke. She was in her usual state of health until November 2019 when she had sudden onset right facial as well as right eye droop and right leg weakness that persisted for more than a day. She was eventually evaluated for this and it was deemed to be a likely vertebrobasilar TIA as her brain imaging did not demonstrate any infarcts. She was started on aspirin and clopidogrel and has not had any recurrent symptoms since then. At the time she describes that she had a headache albeit different from her usual migraines and that she did not have any photophobia and she did not have the severe nausea and emesis that usually accompany her migraines. She had not had any long travels or extended periods of immobility prior to her event and she was not on any antiplatelets or anticoagulation prior. She has no history of VTE although she has had multiple duplex ultrasound exams of both her legs due to complaints of leg pain and/or swelling; all of these exams showed no DVT. The evaluation for her TIA is summarized below. She is fairly active and she likes to use a push mower for her lawn, she cleans around the house. She has right hip and knee pain which is chronic but she also describes occasional chest discomfort and palpitations when she is pushing the mower. Notably she had those symptoms during her 30-day event monitoring period.     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 PD catheter remains in place. 6 day cramer (removed without issue), surgery went well. Post op:  PCN G #1 given 3/1 for +RPR donor cultures; needs weekly x 3 total per ID.   DGF: not clearing/low UOP; MWF chair, last 3/8/24  Drain remained in place at time of DC. Re-admitted 3/10 for increase in JEAN-PIERRE drain, drain Cr 2.8 (same as serum). US 3/10 with 2 fluid collections: SQ collection + deeper collection with drain terminating in the collection.     He presented to outpatient transplant clinic 3/13 with report of significant increase in drain output (~950) and increased leakage from incision. Lab work notable for leukocytosis. JEAN-PIERRE drain creatinine 29 (prev 2.8 on 3/10), significant for urine leak. Plan for CT a/p to assess collection and current drain placement. Abx started. Cramer to be placed.  with Cr 2.8. Plan to admit for infectious work-up and urine leak. Blood/urine cx pending. Assess need for HD daily. D/w Dr. Stokes.        Facility-Administered Medications Prior to Admission   Medication    penicillin G benzathine (BICILLIN LA) injection 2.4 Million Units     PTA Medications   Medication Sig    aspirin (ECOTRIN) 81 MG EC tablet Take 81 mg by mouth once daily.    calcitRIOL (ROCALTROL) 0.5 MCG Cap Take 1 capsule (0.5 mcg total) by mouth once daily.    carvediloL (COREG) 6.25 MG tablet Take 1 tablet (6.25 mg total) by mouth 2 (two) times daily.    HUMALOG U-100 INSULIN 100 unit/mL injection Inject into the skin. FOR INSULIN PUMP (BASAL 1.2 UNITS/HR)    latanoprost 0.005 % ophthalmic solution Place 1 drop into the right eye every other day. At night    levothyroxine (SYNTHROID) 50 MCG tablet Take 1 tablet (50 mcg total)  by mouth before breakfast.    multivitamin Tab Take 1 tablet by mouth once daily.    mycophenolate (CELLCEPT) 250 mg Cap Take 4 capsules (1,000 mg total) by mouth 2 (two) times daily.    ONETOUCH DELICA PLUS LANCET 30 gauge Misc     oxyCODONE (ROXICODONE) 10 mg Tab immediate release tablet Take 1 tablet (10 mg total) by mouth every 4 (four) hours as needed for Pain.    pantoprazole (PROTONIX) 40 MG tablet Take 1 tablet (40 mg total) by mouth once daily.    predniSONE (DELTASONE) 5 MG tablet Take by mouth daily: 20mg 3/2-3/8, 15mg 3/9-3/15, 10mg 3/16-3/22/2024, 5mg 3/23/2024-    sodium bicarbonate 650 MG tablet Take 2 tablets (1,300 mg total) by mouth 2 (two) times daily.    subcutaneous insulin pump Misc Inject 1 Units/hr into the skin continuous. Insulin pump name - Tandum  Basal dose is 2 units/hr    sulfamethoxazole-trimethoprim 400-80mg (BACTRIM,SEPTRA) 400-80 mg per tablet Take 1 tablet by mouth every Mon, Wed, Fri. Stop 8/27/24    tacrolimus (PROGRAF) 1 MG Cap Take 5 capsules (5 mg total) by mouth every 12 (twelve) hours. Z94.0;Kidney txp on 2/29/2024    valGANciclovir (VALCYTE) 450 mg Tab Take 1 tablet (450 mg total) by mouth every Mon, Wed, Fri. Stop 8/27/24       Review of patient's allergies indicates:   Allergen Reactions    Allopurinol analogues Swelling    Latex, natural rubber     Statins-hmg-coa reductase inhibitors Other (See Comments)     Muscle ache    Adhesive Rash       Past Medical History:   Diagnosis Date    Anemia     Cataract     Diabetes mellitus     Diabetes mellitus, type 2     Glaucoma     Gout, unspecified     HLD (hyperlipidemia)     Hypertension     Hypothyroidism, unspecified     Kidney failure     Renal disorder      Past Surgical History:   Procedure Laterality Date    COLONOSCOPY N/A 05/18/2022    Procedure: COLONOSCOPY;  Surgeon: Raul Dyer MD;  Location: [facility];  Service: Endoscopy;  Laterality: N/A;    DENTAL SURGERY      EYE SURGERY Bilateral     Cataract     unremarkable  · MRI/MRA of the brain which revealed no prior infarcts and no significant vascular stenosis or aneurysm  · Carotid ultrasound:   1. 50-69% stenosis of the right side by velocity measurements although    visually there is no plaque formation and there is no spectral    broadening of the right internal carotid artery. Findings are    equivocal for significant stenosis. 2. Less than 50% stenosis of the left internal carotid artery. 3. Antegrade flow in the vertebral arteries. 4. Visually, the amount of plaque ismoderate     · LIZZIE 7/21/2020, personally reviewed: Normal biventricular size and systolic function, no significant valve disease, no left atrial or left atrial appendage thrombus. PFO with right-to-left shunt with bubble study with simple anatomy  · Negative HARLEY, normal sed rate 11/2019     A focused history review includes:  1. TIA, cryptogenic, vertebrobasilar 11/2019  2. Migraines without aura. Once a month or so; not on preventive therapy. 3. H/o seizures in childhood and young age. Last about 30 yrs ago. 4. ? MARY JO    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    Blood transfusion     Carotid stenosis     Cerebral artery occlusion with cerebral infarction (HCC)     Fibromyalgia     History of blood transfusion     Hyperlipidemia     Lupus (HCC)     questionable    Pelvic peritoneal adhesions, female (postoperative) (postinfection) 7/16/2012    Right leg pain     Seizure ([address]     last one 30 yrs ago- h/o epilepsy as a child    Unspecified symptom associated with female genital organs 7/16/2012    Ureteral obstruction as a child had surgery to correct           Social History:    Social History     Tobacco History     Smoking Status  Never Smoker    Smokeless Tobacco Use  Never Used          Alcohol History     Alcohol Use Status  Yes          Drug "INSERTION, CATHETER, DIALYSIS, PERITONEAL, LAPAROSCOPIC N/A 06/20/2023    Procedure: INSERTION, CATHETER, DIALYSIS, PERITONEAL, LAPAROSCOPIC;  Surgeon: Carlos Alberto Rodgers MD;  Location: [facility] OR;  Service: General;  Laterality: N/A;    KIDNEY TRANSPLANT N/A 2/29/2024    Procedure: TRANSPLANT, KIDNEY;  Surgeon: Pino Law Jr., MD;  [facility];  Service: Transplant;  Laterality: N/A;    KNEE SURGERY Right      Family History       Problem Relation (Age of Onset)    Cancer Sister    Diabetes Father, Brother    Heart disease Father    Hypertension Father    Kidney disease Brother          Tobacco Use    Smoking status: Never    Smokeless tobacco: Never   Substance and Sexual Activity    Alcohol use: Never    Drug use: Never    Sexual activity: Not Currently        Review of Systems   Constitutional:  Negative for chills and fever.   HENT:  Negative for facial swelling and trouble swallowing.    Eyes:  Negative for photophobia and redness.   Respiratory:  Negative for cough, shortness of breath, wheezing and stridor.    Cardiovascular:  Positive for leg swelling. Negative for chest pain and palpitations.   Gastrointestinal:  Positive for abdominal pain. Negative for abdominal distention, diarrhea, nausea and vomiting.   Genitourinary:  Positive for decreased urine volume.   Musculoskeletal:  Negative for neck pain and neck stiffness.   Allergic/Immunologic: Positive for immunocompromised state.   Neurological:  Negative for dizziness and light-headedness.   Psychiatric/Behavioral:  Negative for agitation, behavioral problems and confusion.      Objective:     Vital Signs (Most Recent):  Temp: 97.8 °F (36.6 °C) (03/13/24 1715)  Pulse: 100 (03/13/24 1715)  Resp: 20 (03/13/24 1715)  BP: (!) 130/59 (03/13/24 1715)  SpO2: 100 % (03/13/24 1715)  Height: 5' 4" (162.6 cm)  Weight: 89.9 kg (198 lb 3.1 oz)  Body mass index is 34.02 kg/m².      Physical Exam  Vitals and nursing note reviewed. " Use     Drug Use Status  Never          Sexual Activity     Sexually Active  Not Asked                    Family History:       Problem Relation Age of Onset    Heart Attack Mother     Alzheimer's Disease Mother     Cancer Father         skin    No Known Problems Sister     No Known Problems Brother     No Known Problems Sister     COPD Sister     Diabetes Brother     No Known Problems Brother     No Known Problems Brother          Review of Systems   General ROS: No weight loss fevers or chills  Psychological ROS: No new depression or anxiety or altered mood  Ophthalmic ROS: No new vision changes or diplopia  Respiratory ROS: No cough, wheezing, shortness of breath, or hemoptysis  Cardiovascular ROS: See HPI  Gastrointestinal ROS: No nausea, vomiting, constipation, diarrhea, hematemesis, melena, or hematochezia  Genito-Urinary ROS: No dysuria, hematuria, or new incontinence  Musculoskeletal ROS: No new muscle pain, joint pain, joint swelling, or back pain  Neurological ROS: No new numbness or paresthesias, no focal weakness, no altered speech, no new memory loss  Dermatological ROS: No new rashes, no pruritus, no skin masses        Medications     Current Outpatient Medications   Medication Sig Dispense Refill    acetaminophen (TYLENOL) 325 MG tablet Take 650 mg by mouth every 6 hours as needed for Pain      omeprazole (PRILOSEC) 10 MG delayed release capsule take 1 capsule by oral route  every day before a meal      Multiple Vitamin (MULTI-VITAMIN DAILY PO) Take by mouth daily      atorvastatin (LIPITOR) 40 MG tablet Take 1 tablet by mouth nightly 30 tablet 0    aspirin (PX ENTERIC ASPIRIN) 81 MG EC tablet Take 1 tablet by mouth daily 30 tablet 0    clopidogrel (PLAVIX) 75 MG tablet Take 1 tablet by mouth daily 30 tablet 0     No current facility-administered medications for this visit.             Physical Examination      BP 90/64 (Site: Right Upper Arm, Position: Sitting, Cuff Size: Large Adult)   Constitutional:       General: He is not in acute distress.  HENT:      Head: Normocephalic and atraumatic.   Eyes:      General: No scleral icterus.        Right eye: No discharge.         Left eye: No discharge.   Cardiovascular:      Rate and Rhythm: Normal rate and regular rhythm.      Heart sounds: No murmur heard.  Pulmonary:      Effort: Pulmonary effort is normal. No respiratory distress.      Breath sounds: No wheezing or rales.   Abdominal:      General: Bowel sounds are normal. There is no distension.      Tenderness: There is abdominal tenderness (incisional). There is no guarding.      Comments: PD catheter in place no s/s/I  R JEAN-PIERRE drain ss output    Musculoskeletal:      Right lower leg: Edema present.      Left lower leg: Edema present.   Skin:     General: Skin is warm and dry.      Capillary Refill: Capillary refill takes less than 2 seconds.      Coloration: Skin is not jaundiced.   Neurological:      General: No focal deficit present.      Mental Status: He is alert and oriented to person, place, and time.   Psychiatric:         Mood and Affect: Mood normal.         Behavior: Behavior normal.         Thought Content: Thought content normal.          Laboratory  CBC:   Recent Labs   Lab 03/10/24  0557 03/11/24  0855 03/13/24  0940   WBC 14.12* 12.44 16.62*   RBC 2.86* 2.66* 2.71*   HGB 8.8* 8.3* 8.5*   HCT 27.0* 25.3* 26.3*    432 517*   MCV 94 95 97   MCH 30.8 31.2* 31.4*   MCHC 32.6 32.8 32.3     CMP:   Recent Labs   Lab 03/08/24  1958 03/10/24  0557 03/11/24  0855 03/13/24  0940   * 117* 131* 111*   CALCIUM 9.5 9.6 10.0 10.5   ALBUMIN 3.2* 3.3* 3.0* 3.4*   PROT 6.2 6.1  --   --     133* 138 141   K 4.1 3.7 4.0 4.8   CO2 17* 19* 22* 22*    100 106 110   BUN 31* 57* 80* 112*   CREATININE 2.5* 2.8* 3.1* 2.8*   ALKPHOS 58 69  --   --    ALT 11 17  --   --    AST 32 30  --   --        Diagnostic Results:  CT ordered      Assessment/Plan:     Cardiac/Vascular  Essential  Pulse 60   Resp 16   Ht 5' 3\" (1.6 m)   Wt 208 lb 6.4 oz (94.5 kg)   LMP 06/20/2012   BMI 36.92 kg/m²     General: No acute distress, appears his stated age, nonicteric  Head: Atraumatic, no gross abnormalities or bruises  Neck: Supple and nontender, no carotid bruits, no JVP  Lungs: Clear to auscultation bilaterally, no wheezes, rales, or rhonchi  Heart: Regular rate and rhythm, no murmurs, rubs, or gallops  Abdomen: Soft, nontender, nondistended, normal bowel sounds  Extremities: No obvious deformities, no cyanosis, no edema  Neurological: Alert and oriented x3, EOMI, moving all extremities x4  Psychological: Normal mood and affect, cooperative  Skin: Color, texture, and turgor normal for age          Labs/Imaging/Diagnostics     Lab Results   Component Value Date     07/22/2020    K 4.3 07/22/2020     07/22/2020    CO2 28 07/22/2020    BUN 13 07/22/2020    CREATININE 0.7 07/22/2020    GLUCOSE 99 07/22/2020    GLUCOSE 90 08/11/2011    CALCIUM 9.6 07/22/2020        Estimated Creatinine Clearance: 99 mL/min (based on SCr of 0.7 mg/dL).     Lab Results   Component Value Date    WBC 4.9 07/22/2020    HGB 12.1 07/22/2020    HCT 37.8 07/22/2020    MCV 91.5 07/22/2020     07/22/2020       Lab Results   Component Value Date    ALT 12 11/09/2019    AST 16 11/09/2019    ALKPHOS 59 11/09/2019    BILITOT 0.3 11/09/2019       Lab Results   Component Value Date    LABALBU 3.8 11/09/2019       Lab Results   Component Value Date    CHOL 151 07/22/2020    CHOL 200 (H) 11/08/2019     Lab Results   Component Value Date    TRIG 74 07/22/2020    TRIG 89 11/08/2019     Lab Results   Component Value Date    HDL 77 07/22/2020    HDL 77 11/08/2019     Lab Results   Component Value Date    LDLCALC 59 07/22/2020    LDLCALC 105 (H) 11/08/2019     Lab Results   Component Value Date    LABVLDL 15 07/22/2020    LABVLDL 18 11/08/2019     Lab Results   Component Value Date    COLORU Yellow 07/22/2020    NITRU Negative "hypertension  - cont home meds      Renal/  * Urine leak from transplanted ureter  - He presented to outpatient transplant clinic 3/13 with report of significant increase in drain output (~950) and increased leakage from incision. Lab work notable for leukocytosis.   - JEAN-PIERRE drain creatinine 29 (prev 2.8 on 3/10), significant for urine leak.   - Plan for CT a/p to assess collection and current drain placement. Abx started.   - Curry to be placed.   -  with Cr 2.8.   - Plan to admit for infectious work-up and urine leak. Blood/urine cx pending.      Acidosis  - Continue oral bicarb.      Delayed graft function of kidney  - Post-op DGF 2/2 DCD and long cold time  - Last HD 3/8   - BUN elevated, but no AMS, no n/v, 1+ BLE edema but lungs clear not requiring oxygen. Hold HD today.  - Assess need for HD daily.    Status post -donor kidney transplantation  - S/p DBD Ktx on 24 for presumed diabetic nephropathy (donor RPR+, CMV D+, R-, CIT ~16.5hrs).  - Patient being managed for DGF  - A/w increased drainage from incision and ouput from drain (~950cc)  - See "urine leak."    NIHARIKA (acute kidney injury)  - see "DGF"      ID  Positive RPR test in cadaveric donor  - Weekly PCN G x 3      At risk for opportunistic infections  - Continue Valcyte for CMV prophylaxis  - Continue Bactrim for PCP prophylaxis       Immunology/Multi System  Prophylactic immunotherapy  - Continue Prograf. Will monitor for signs of toxic side effects, check daily tacrolimus troughs, and change meds accordingly.   - MMF held for leukocytosis   - Continue steroids.      Oncology  Leukocytosis  - obtain blood cultures as well as UA and Urine cx   - recently treated with PCN for donor positive RPR      Endocrine  Type 2 diabetes mellitus with kidney complication, with long-term current use of insulin  - Endocrine           Palliative Care  Long-term use of immunosuppressant medication  - see "prophylactic immuno"              Medical " 07/22/2020    GLUCOSEU Negative 07/22/2020    KETUA Negative 07/22/2020    UROBILINOGEN 0.2 07/22/2020    BILIRUBINUR Negative 07/22/2020     Lab Results   Component Value Date    INR 1.0 07/22/2020    INR 1.0 05/18/2011    PROTIME 10.8 07/22/2020    PROTIME 11.0 05/18/2011     Lab Results   Component Value Date    APTT 29.4 07/22/2020         Lab Results   Component Value Date    LABA1C 5.3 11/08/2019     No results found for: EAG     Pertinent Cardiovascular Studies:  EKG: June 2020  Normal sinus rhythm. Normal    Risk of paradoxical embolism (RoPE) score in adults: [Demarcsu VELÁZQUEZ et al. Neurology 2013; 56:696]    Hypertension? No   Yes (0 points)   No (1 point)  Diabetes? No   Yes (0 points)   No (1 point)  Prior stroke or TIA? No   Yes (0 points)   No (1 point)  Smoker? No   Yes (0 points)   No (1 point)  Cortical infarct on imaging? No   Yes (1 point)   No (0 points)  Age range: Two-points   18 to 29 years (5 points)   30 to 39 year (4 points)   40 to 49 years (3 points)   50 to 59 years (2 points)   60 to 69 years (1 point)   ? 70 years (0 points)  Total score: 6    Total points PFO-attributable fraction (percent) (95% CI)  0 to 3  0% (0 to 4)  4  38% (25 to 48)  5  34% (21 to 45)  6  62% (54 to 68)  7  62% (54 to 68)  8  84% (79 to 87)  9 to 10  88% (83 to 91)    RoPE score 6 suggesting that the PFO-attributable stroke risk is approximately 62%        Assessment and Plan: This is a very pleasant 76-year-old  female with history of simple migraines who had a vertebrobasilar TIA in November 2019 with an essentially negative evaluation for etiology as summarized above. She was found to have a PFO with right-to-left shunting and a simple anatomy on LIZZIE and she was referred to us for consideration of percutaneous PFO closure. Her RoPE score is 6 as above. She has not had any recurrent symptoms on dual antiplatelet therapy.     I reviewed with Ms Gold the available literature regarding PFO closure for prevention of recurrent cryptogenic stroke/TIA. I explained that the benefits include 50 to 70% relative risk reduction in cryptogenic stroke or TIA without the need for anticoagulation or prolonged dual antiplatelet therapy. The risks are generally small and include venous access related complications which are usually not life-threatening, new onset atrial arrhythmia which is usually transient, and rarely pericardial effusion with tamponade, device embolization, device thrombosis, stroke and death. The alternatives include antiplatelet therapy, single or dual, which has been shown to be inferior to PFO closure, or long-term oral anticoagulation with its inherent complications. She expressed her wish to proceed with percutaneous PFO closure based on the above discussion and gave her consent to the procedure verbally. Diagnosis Orders   1. PFO (patent foramen ovale)  Referral to Cardiac Cath   2. Pre-op evaluation  CBC WITH AUTO DIFFERENTIAL    BASIC METABOLIC PANEL    URINALYSIS    LIPID PANEL    PROTIME-INR    APTT   3. TIA (transient ischemic attack)       · Preop labs checked today and are all within normal limits  · Percutaneous PFO closure scheduled for next Wednesday, July 29 at 12:30 PM.  · ICE for procedure guidance  · Moderate sedation  · 1.5 g IV cefazolin before and 8 hours after the procedure  · Continue aspirin and clopidogrel throughout the perioperative period. 300 mg p.o. clopidogrel once after the procedure  · Anticipate stay overnight for observation    · Regarding her complaint of exertional chest discomfort possibly I will defer the evaluation to Dr. Puneet Ruiz Follow up with me 1 month after the procedure    Thank you for the referral.  We appreciate the opportunity to participate in Her care!       Ronda Hooper MD  Interventional Cardiology/Structural Heart Disease  Cell: [phone] decision making for this encounter includes review of pertinent labs and diagnostic studies, assessment and planning, discussions with consulting providers, discussion with patient/family, and participation in multidisciplinary rounds. Time spent caring for patient: 60 minutes    Lakeisha Burr PA-C  Kidney Transplant  [facility] - Transplant [facility]

## 2024-03-14 DIAGNOSIS — Z87.74 S/P PERCUTANEOUS PATENT FORAMEN OVALE CLOSURE: ICD-10-CM

## 2024-03-14 RX ORDER — ATORVASTATIN CALCIUM 40 MG/1
40 TABLET, FILM COATED ORAL DAILY
Qty: 90 TABLET | Refills: 1 | Status: SHIPPED | OUTPATIENT
Start: 2024-03-14

## 2024-03-14 RX ORDER — METOPROLOL SUCCINATE 25 MG/1
25 TABLET, EXTENDED RELEASE ORAL DAILY
Qty: 90 TABLET | Refills: 1 | Status: SHIPPED | OUTPATIENT
Start: 2024-03-14

## 2024-04-17 ENCOUNTER — HOSPITAL ENCOUNTER (OUTPATIENT)
Age: 59
Discharge: HOME | End: 2024-04-19
Payer: COMMERCIAL

## 2024-04-17 ENCOUNTER — HOSPITAL ENCOUNTER (OUTPATIENT)
Dept: GENERAL RADIOLOGY | Age: 59
Discharge: HOME | End: 2024-04-19
Payer: COMMERCIAL

## 2024-04-17 DIAGNOSIS — M79.671 RIGHT FOOT PAIN: ICD-10-CM

## 2024-04-17 PROCEDURE — 73630 X-RAY EXAM OF FOOT: CPT

## 2024-04-19 ENCOUNTER — HOSPITAL ENCOUNTER (EMERGENCY)
Age: 59
Discharge: HOME | End: 2024-04-19
Payer: COMMERCIAL

## 2024-04-19 ENCOUNTER — APPOINTMENT (OUTPATIENT)
Dept: GENERAL RADIOLOGY | Age: 59
End: 2024-04-19
Payer: COMMERCIAL

## 2024-04-19 VITALS
HEART RATE: 83 BPM | TEMPERATURE: 98.1 F | DIASTOLIC BLOOD PRESSURE: 71 MMHG | RESPIRATION RATE: 18 BRPM | BODY MASS INDEX: 35.48 KG/M2 | SYSTOLIC BLOOD PRESSURE: 109 MMHG | OXYGEN SATURATION: 96 % | WEIGHT: 194 LBS

## 2024-04-19 DIAGNOSIS — M54.31 SCIATICA OF RIGHT SIDE: Primary | ICD-10-CM

## 2024-04-19 PROCEDURE — 73560 X-RAY EXAM OF KNEE 1 OR 2: CPT

## 2024-04-19 PROCEDURE — 6360000002 HC RX W HCPCS: Performed by: NURSE PRACTITIONER

## 2024-04-19 PROCEDURE — 99211 OFF/OP EST MAY X REQ PHY/QHP: CPT

## 2024-04-19 RX ORDER — DEXAMETHASONE SODIUM PHOSPHATE 10 MG/ML
10 INJECTION, SOLUTION INTRAMUSCULAR; INTRAVENOUS ONCE
Status: COMPLETED | OUTPATIENT
Start: 2024-04-19 | End: 2024-04-19

## 2024-04-19 RX ORDER — KETOROLAC TROMETHAMINE 30 MG/ML
30 INJECTION, SOLUTION INTRAMUSCULAR; INTRAVENOUS ONCE
Status: COMPLETED | OUTPATIENT
Start: 2024-04-19 | End: 2024-04-19

## 2024-04-19 RX ORDER — TRAMADOL HYDROCHLORIDE 50 MG/1
50 TABLET ORAL EVERY 6 HOURS PRN
Qty: 12 TABLET | Refills: 0 | Status: SHIPPED | OUTPATIENT
Start: 2024-04-19 | End: 2024-04-22

## 2024-04-19 RX ORDER — METHYLPREDNISOLONE 4 MG/1
TABLET ORAL
Qty: 21 TABLET | Refills: 0 | Status: SHIPPED | OUTPATIENT
Start: 2024-04-19

## 2024-04-19 RX ORDER — METHYLPREDNISOLONE 4 MG/1
TABLET ORAL
Qty: 21 TABLET | Refills: 0 | Status: SHIPPED | OUTPATIENT
Start: 2024-04-19 | End: 2024-04-19

## 2024-04-19 RX ADMIN — DEXAMETHASONE SODIUM PHOSPHATE 10 MG: 10 INJECTION, SOLUTION INTRAMUSCULAR; INTRAVENOUS at 16:33

## 2024-04-19 RX ADMIN — KETOROLAC TROMETHAMINE 30 MG: 30 INJECTION, SOLUTION INTRAMUSCULAR at 16:35

## 2024-04-19 ASSESSMENT — PAIN SCALES - GENERAL: PAINLEVEL_OUTOF10: 10

## 2024-04-19 ASSESSMENT — PAIN DESCRIPTION - LOCATION: LOCATION: LEG

## 2024-04-19 ASSESSMENT — PAIN DESCRIPTION - DESCRIPTORS: DESCRIPTORS: SHARP

## 2024-04-19 ASSESSMENT — PAIN DESCRIPTION - ORIENTATION: ORIENTATION: RIGHT

## 2024-04-19 NOTE — ED PROVIDER NOTES
Department of Emergency Medicine  [facility]  Provider Note  Admit Date/Time: 2024  4:04 PM  Room:   NAME: Quynh Gold  : 1965  MRN: [id number]     Chief Complaint:  Leg Pain (Left leg pain that started 2 days ago. Denies swelling, redness, or warmth. )    History of Present Illness        Quynh Gold is a 58 y.o. female who has a past medical history of:   Past Medical History:   Diagnosis Date    Acid reflux     Anxiety     Arthritis arthritis back    and legs,     Back pain arthritis    Benign neoplasm of ovary 2012    Blood transfusion     Carotid stenosis     Cerebral artery occlusion with cerebral infarction (HCC)     Fibromyalgia     History of blood transfusion     Hyperlipidemia     Kidney stone     Lupus (HCC)     questionable    Pelvic peritoneal adhesions, female (postoperative) (postinfection) 2012    Right leg pain     Seizure (HCC)     last one 30 yrs ago- h/o epilepsy as a child    Spinal headache     Unspecified symptom associated with female genital organs 2012    Ureteral obstruction as a child had surgery to correct     presents to the urgent care center by private car for evaluation of pain in her entire right leg she is not complaining of any numbness or tingling not complaining of any urinary symptoms states this started when she mowed her yard the other day she also has a history of a knee replacement and she is having increased pain in her right knee she said sometimes the low back hurts and sometimes it does not but right now it is not hurting but she does have pain in the entire right leg.  She has a history of sciatica states that she had surgery for that really has not had much of a problem with it but does have a history of sciatica she denies any calf pain or swelling denies any numbness or tingling.  She said this is very painful and nothing is taking away the pain she has not slept for 2 days because of

## 2024-06-13 ENCOUNTER — OFFICE VISIT (OUTPATIENT)
Dept: PODIATRY | Age: 59
End: 2024-06-13
Payer: COMMERCIAL

## 2024-06-13 VITALS — HEIGHT: 63 IN | BODY MASS INDEX: 34.2 KG/M2 | WEIGHT: 193 LBS

## 2024-06-13 DIAGNOSIS — R26.2 DIFFICULTY WALKING: ICD-10-CM

## 2024-06-13 DIAGNOSIS — R60.0 LOCALIZED EDEMA: ICD-10-CM

## 2024-06-13 DIAGNOSIS — M76.61 ACHILLES BURSITIS OF RIGHT LOWER EXTREMITY: Primary | ICD-10-CM

## 2024-06-13 DIAGNOSIS — M25.571 PAIN IN RIGHT ANKLE AND JOINTS OF RIGHT FOOT: ICD-10-CM

## 2024-06-13 PROCEDURE — G8417 CALC BMI ABV UP PARAM F/U: HCPCS | Performed by: PODIATRIST

## 2024-06-13 PROCEDURE — 29580 STRAPPING UNNA BOOT: CPT | Performed by: PODIATRIST

## 2024-06-13 PROCEDURE — 3017F COLORECTAL CA SCREEN DOC REV: CPT | Performed by: PODIATRIST

## 2024-06-13 PROCEDURE — 1036F TOBACCO NON-USER: CPT | Performed by: PODIATRIST

## 2024-06-13 PROCEDURE — 99204 OFFICE O/P NEW MOD 45 MIN: CPT | Performed by: PODIATRIST

## 2024-06-13 PROCEDURE — G8427 DOCREV CUR MEDS BY ELIG CLIN: HCPCS | Performed by: PODIATRIST

## 2024-06-13 RX ORDER — TIZANIDINE 4 MG/1
TABLET ORAL
COMMUNITY
Start: 2024-04-25

## 2024-06-13 RX ORDER — PREDNISONE 20 MG/1
40 TABLET ORAL DAILY
Qty: 20 TABLET | Refills: 0 | Status: SHIPPED | OUTPATIENT
Start: 2024-06-13 | End: 2024-06-23

## 2024-06-13 RX ORDER — TOPIRAMATE 25 MG/1
TABLET ORAL
COMMUNITY
Start: 2024-05-21

## 2024-06-13 NOTE — PROGRESS NOTES
Patient is in today for evaluation of right foot pain. Patient says she knows she has a heel spur and it is very painful. Patient says it causes swelling on the sides of her heel. Pcp is Hugh Renteria PA  Last ov 5/29/24  
(MEDROL, TAMRA,) 4 MG tablet, Take as directed on package insert days 1-6. Start this on 4/20 (Patient not taking: Reported on 6/13/2024), Disp: 21 tablet, Rfl: 0    cephALEXin (KEFLEX) 500 MG capsule, Take 4 capsules by mouth daily as needed (take 2 g, 1 hour prior to dental procedure) (Patient not taking: Reported on 4/19/2024), Disp: 4 capsule, Rfl: 3    Dextromethorphan-guaiFENesin (MUCINEX DM MAXIMUM STRENGTH)  MG TB12, Take 1 tablet by mouth every 12 hours as needed (cough) (Patient not taking: Reported on 4/19/2024), Disp: 12 tablet, Rfl: 0    omeprazole (PRILOSEC) 10 MG delayed release capsule, take 1 capsule by mouth once daily BEFORE A MEAL (Patient not taking: Reported on 4/19/2024), Disp: , Rfl:     Allergies:  No Known Allergies    Vitals:    06/13/24 1054   Weight: 87.5 kg (193 lb)   Height: 1.6 m (5' 3\")        Past Medical History:   Diagnosis Date    Acid reflux     Anxiety     Arthritis arthritis back    and legs,     Back pain arthritis    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    Blood transfusion     Carotid stenosis     Cerebral artery occlusion with cerebral infarction (HCC)     Fibromyalgia     History of blood transfusion     Hyperlipidemia     Kidney stone     Lupus (HCC)     questionable    Pelvic peritoneal adhesions, female (postoperative) (postinfection) 07/16/2012    Right leg pain     Seizure (HCC)     last one 30 yrs ago- h/o epilepsy as a child    Spinal headache     Unspecified symptom associated with female genital organs 07/16/2012    Ureteral obstruction as a child had surgery to correct      Family History   Problem Relation Age of Onset    Heart Attack Mother     Alzheimer's Disease Mother     Breast Cancer Mother 67    Cancer Father         skin rare form     Diabetes Father     No Known Problems Sister     No Known Problems Sister     COPD Sister     No Known Problems Brother     Diabetes Brother     No Known Problems Brother     Colon Cancer Maternal Grandmother

## 2024-06-19 ENCOUNTER — OFFICE VISIT (OUTPATIENT)
Dept: PODIATRY | Age: 59
End: 2024-06-19
Payer: COMMERCIAL

## 2024-06-19 VITALS — WEIGHT: 193 LBS | HEIGHT: 62 IN | BODY MASS INDEX: 35.51 KG/M2

## 2024-06-19 DIAGNOSIS — M76.61 ACHILLES BURSITIS OF RIGHT LOWER EXTREMITY: Primary | ICD-10-CM

## 2024-06-19 DIAGNOSIS — R60.0 LOCALIZED EDEMA: ICD-10-CM

## 2024-06-19 DIAGNOSIS — M25.571 PAIN IN RIGHT ANKLE AND JOINTS OF RIGHT FOOT: ICD-10-CM

## 2024-06-19 DIAGNOSIS — R26.2 DIFFICULTY WALKING: ICD-10-CM

## 2024-06-19 PROCEDURE — G8427 DOCREV CUR MEDS BY ELIG CLIN: HCPCS | Performed by: PODIATRIST

## 2024-06-19 PROCEDURE — 1036F TOBACCO NON-USER: CPT | Performed by: PODIATRIST

## 2024-06-19 PROCEDURE — 99213 OFFICE O/P EST LOW 20 MIN: CPT | Performed by: PODIATRIST

## 2024-06-19 PROCEDURE — G8417 CALC BMI ABV UP PARAM F/U: HCPCS | Performed by: PODIATRIST

## 2024-06-19 PROCEDURE — 3017F COLORECTAL CA SCREEN DOC REV: CPT | Performed by: PODIATRIST

## 2024-06-19 NOTE — PROGRESS NOTES
24     Quynh Gold    : 1965   Sex: female    Age: 58 y.o.    Patient's PCP/Provider is:  Hugh Renteria PA    Subjective:  Patient is seen today for follow-up regarding continued care regarding chronic Achilles bursitis and tendinosis issues right lower extremity.  Overall patient is still having issues after the compression dressing was applied and oral NSAIDs prescribed.  Patient still having difficulties with every day ambulatory activities.  She did want to discuss other treatment options available at this time.  No other additional abnormalities noted.    Chief Complaint   Patient presents with    Foot Pain     Achilles bursitis of right foot       ROS:  Const: Positives and pertinent negatives as per HPI.    Musculo: Denies symptoms other than stated above.  Neuro: Denies symptoms other than stated above.  Skin: Denies symptoms other than stated above.    Current Medications:    Current Outpatient Medications:     topiramate (TOPAMAX) 25 MG tablet, , Disp: , Rfl:     tiZANidine (ZANAFLEX) 4 MG tablet, take 1 tablet by mouth every 8 hours if needed NOT TO EXCEED 3 DOSES IN 24 HOURS, Disp: , Rfl:     predniSONE (DELTASONE) 20 MG tablet, Take 2 tablets by mouth daily for 10 days, Disp: 20 tablet, Rfl: 0    methylPREDNISolone (MEDROL, TAMRA,) 4 MG tablet, Take as directed on package insert days 1-6. Start this on , Disp: 21 tablet, Rfl: 0    atorvastatin (LIPITOR) 40 MG tablet, take 1 tablet by mouth once daily, Disp: 90 tablet, Rfl: 1    metoprolol succinate (TOPROL XL) 25 MG extended release tablet, take 1 tablet by mouth once daily, Disp: 90 tablet, Rfl: 1    cephALEXin (KEFLEX) 500 MG capsule, Take 4 capsules by mouth daily as needed (take 2 g, 1 hour prior to dental procedure), Disp: 4 capsule, Rfl: 3    acetaminophen (TYLENOL) 500 MG tablet, Take 2 tablets by mouth every 6 hours as needed for Pain, Disp: , Rfl:     Dextromethorphan-guaiFENesin (MUCINEX DM MAXIMUM STRENGTH)  MG

## 2024-06-19 NOTE — PROGRESS NOTES
Patient here for achilles bursitis of right foot. Patient ius still having pain in that foot. Hugh Renteria PA  Electronically signed by Macarena Gar LPN on 6/19/2024 at 9:55 AM

## 2024-07-16 ENCOUNTER — OFFICE VISIT (OUTPATIENT)
Dept: PODIATRY | Age: 59
End: 2024-07-16
Payer: COMMERCIAL

## 2024-07-16 VITALS — WEIGHT: 193 LBS | HEIGHT: 62 IN | BODY MASS INDEX: 35.51 KG/M2

## 2024-07-16 DIAGNOSIS — M79.661 PAIN IN RIGHT LOWER LEG: ICD-10-CM

## 2024-07-16 DIAGNOSIS — M67.88 ACHILLES TENDONOSIS OF RIGHT LOWER EXTREMITY: Primary | ICD-10-CM

## 2024-07-16 DIAGNOSIS — M76.61 ACHILLES BURSITIS OF RIGHT LOWER EXTREMITY: ICD-10-CM

## 2024-07-16 DIAGNOSIS — R26.2 DIFFICULTY WALKING: ICD-10-CM

## 2024-07-16 PROCEDURE — 99214 OFFICE O/P EST MOD 30 MIN: CPT | Performed by: PODIATRIST

## 2024-07-16 PROCEDURE — 3017F COLORECTAL CA SCREEN DOC REV: CPT | Performed by: PODIATRIST

## 2024-07-16 PROCEDURE — G8417 CALC BMI ABV UP PARAM F/U: HCPCS | Performed by: PODIATRIST

## 2024-07-16 PROCEDURE — G8427 DOCREV CUR MEDS BY ELIG CLIN: HCPCS | Performed by: PODIATRIST

## 2024-07-16 PROCEDURE — 1036F TOBACCO NON-USER: CPT | Performed by: PODIATRIST

## 2024-07-16 NOTE — PROGRESS NOTES
Patient here for achilles bursitis right lower extremity. Patient is still having pain. Patient does have her short leg boot on today. Hugh Renteria PA   Electronically signed by Macarena Gar LPN on 7/16/2024 at 2:23 PM

## 2024-07-17 NOTE — PROGRESS NOTES
24     Quynh Gold    : 1965   Sex: female    Age: 58 y.o.    Patient's PCP/Provider is:  Hugh Renteria PA    Subjective:  Patient is seen today for follow-up regarding continued care regarding chronic Achilles tendinosis and bursitis issues right lower extremity.  Patient has had chronic issues ever since her initial Achilles tendon and retrocalcaneal exostosis procedure was performed several years ago.  Patient has been trying to wear the offloading boot as instructed which has not helped with current symptoms.  Patient has used multiple OTC options including icing, elevation, compression, and oral NSAIDs without improvement noted.  She presents today to discuss MRI results, and further options available for care.  No other additional abnormalities noted.    Chief Complaint   Patient presents with    Foot Pain     Achilles bursitis of right lower extremity       ROS:  Const: Positives and pertinent negatives as per HPI.    Musculo: Denies symptoms other than stated above.  Neuro: Denies symptoms other than stated above.  Skin: Denies symptoms other than stated above.    Current Medications:    Current Outpatient Medications:     topiramate (TOPAMAX) 25 MG tablet, , Disp: , Rfl:     tiZANidine (ZANAFLEX) 4 MG tablet, take 1 tablet by mouth every 8 hours if needed NOT TO EXCEED 3 DOSES IN 24 HOURS, Disp: , Rfl:     methylPREDNISolone (MEDROL, TAMRA,) 4 MG tablet, Take as directed on package insert days 1-6. Start this on , Disp: 21 tablet, Rfl: 0    atorvastatin (LIPITOR) 40 MG tablet, take 1 tablet by mouth once daily, Disp: 90 tablet, Rfl: 1    metoprolol succinate (TOPROL XL) 25 MG extended release tablet, take 1 tablet by mouth once daily, Disp: 90 tablet, Rfl: 1    cephALEXin (KEFLEX) 500 MG capsule, Take 4 capsules by mouth daily as needed (take 2 g, 1 hour prior to dental procedure), Disp: 4 capsule, Rfl: 3    acetaminophen (TYLENOL) 500 MG tablet, Take 2 tablets by mouth every 6

## 2024-07-18 ENCOUNTER — PREP FOR PROCEDURE (OUTPATIENT)
Dept: PODIATRY | Age: 59
End: 2024-07-18

## 2024-07-18 ENCOUNTER — TELEPHONE (OUTPATIENT)
Dept: PODIATRY | Age: 59
End: 2024-07-18

## 2024-07-18 DIAGNOSIS — M67.88 ACHILLES TENDINOSIS OF RIGHT LOWER EXTREMITY: ICD-10-CM

## 2024-07-18 DIAGNOSIS — M79.661 PAIN IN RIGHT LOWER LEG: ICD-10-CM

## 2024-07-18 NOTE — TELEPHONE ENCOUNTER
Prior Authorization Form:      DEMOGRAPHICS:                     Patient Name:  Camilo Nathan  Patient :  1965            Insurance:  Payor: [employer] / Plan: [employer] [address] / Product Type: *No Product type* /   Insurance ID Number:    Payer/Plan Subscr  Sex Relation Sub. Ins. ID Effective Group Num   1. [employer]* CAMILO NATHAN 1965 Female Self [id number] 10/1/19 [id number]                                   [address]         DIAGNOSIS & PROCEDURE:                       Procedure/Operation: Repair Achilles Tendon with Grafting and PRP Augmentation Right Lower Extremity           CPT Code: 36149    Diagnosis:  achilles tendonosis right lower extremity, pain in right lower extremity    ICD10 Code: M67.88, M79.661    Location:  [facility]    Surgeon:  Dr. Moshe Short    SCHEDULING INFORMATION:                          Date: 24    Time: TBD              Anesthesia:  General                                                       Status:  Outpatient        Special Comments:  None.        Electronically signed by Vania Olvera LPN on 2024 at 10:07 AM

## 2024-07-23 ENCOUNTER — ANESTHESIA EVENT (OUTPATIENT)
Dept: OPERATING ROOM | Age: 59
End: 2024-07-23
Payer: COMMERCIAL

## 2024-07-23 NOTE — ANESTHESIA PRE PROCEDURE
Department of Anesthesiology  Preprocedure Note       Name:  Quynh Gold   Age:  58 y.o.  :  1965                                          MRN:  [id number]         Date:  2024      Surgeon: Surgeon(s):  Moshe Short Jr., DPM    Procedure: Procedure(s):  Repair Achilles Tendon with Grafting and PRP Augmentation Right Lower Extremity- 1 hr    Medications prior to admission:   Prior to Admission medications    Medication Sig Start Date End Date Taking? Authorizing Provider   topiramate (TOPAMAX) 25 MG tablet  24   Missy Epps MD   tiZANidine (ZANAFLEX) 4 MG tablet take 1 tablet by mouth every 8 hours if needed NOT TO EXCEED 3 DOSES IN 24 HOURS 24   Missy Epps MD   methylPREDNISolone (MEDROL, TAMRA,) 4 MG tablet Take as directed on package insert days 1-6. Start this on 24   Machelle yAers APRN - CNP   atorvastatin (LIPITOR) 40 MG tablet take 1 tablet by mouth once daily 3/14/24   Amanda Cardoso MD   metoprolol succinate (TOPROL XL) 25 MG extended release tablet take 1 tablet by mouth once daily 3/14/24   Amanda Cardoso MD   cephALEXin (KEFLEX) 500 MG capsule Take 4 capsules by mouth daily as needed (take 2 g, 1 hour prior to dental procedure) 24   Zara Acosta PA-C   acetaminophen (TYLENOL) 500 MG tablet Take 2 tablets by mouth every 6 hours as needed for Pain    Missy Epps MD   Dextromethorphan-guaiFENesin (MUCINEX DM MAXIMUM STRENGTH)  MG TB12 Take 1 tablet by mouth every 12 hours as needed (cough) 23   Venkatesh Scales PA-C   omeprazole (PRILOSEC) 10 MG delayed release capsule  22   Missy Epps MD   busPIRone (BUSPAR) 10 MG tablet take 1 tablet by mouth twice a day 21   Missy Epps MD   aspirin (PX ENTERIC ASPIRIN) 81 MG EC tablet Take 1 tablet by mouth daily 20   Mirna Bauman MD       Current medications:    No current facility-administered medications for this encounter.     Current

## 2024-07-24 ENCOUNTER — TELEPHONE (OUTPATIENT)
Dept: CARDIOLOGY CLINIC | Age: 59
End: 2024-07-24

## 2024-07-24 RX ORDER — PHENTERMINE HYDROCHLORIDE 15 MG/1
15 CAPSULE ORAL EVERY MORNING
COMMUNITY
Start: 2024-06-18

## 2024-07-24 RX ORDER — TOPIRAMATE 25 MG/1
25 TABLET, FILM COATED ORAL 2 TIMES DAILY
COMMUNITY

## 2024-07-24 NOTE — TELEPHONE ENCOUNTER
Nurse from Pre admission testing  called states patient is having repair achilles tendon with grafting .     Dr. Short is requesting that Asprin be held for 1 week prior       Please advise on Asprin instructions from a cardiac standpoint

## 2024-07-24 NOTE — TELEPHONE ENCOUNTER
Patient may hold her aspirin 1 week prior to the procedure and resume it ASAP after the procedure.

## 2024-07-25 ENCOUNTER — TELEPHONE (OUTPATIENT)
Dept: CARDIOLOGY CLINIC | Age: 59
End: 2024-07-25

## 2024-07-25 NOTE — TELEPHONE ENCOUNTER
Patient may hold her aspirin 1 week prior to the procedure and resume it ASAP after the procedure     Left vm letting patient know of above.

## 2024-07-29 ENCOUNTER — HOSPITAL ENCOUNTER (OUTPATIENT)
Age: 59
Discharge: HOME | End: 2024-07-31
Payer: COMMERCIAL

## 2024-07-29 ENCOUNTER — HOSPITAL ENCOUNTER (OUTPATIENT)
Dept: GENERAL RADIOLOGY | Age: 59
Discharge: HOME | End: 2024-07-31
Payer: COMMERCIAL

## 2024-07-29 DIAGNOSIS — M79.641 RIGHT HAND PAIN: ICD-10-CM

## 2024-07-29 PROCEDURE — 73130 X-RAY EXAM OF HAND: CPT

## 2024-07-31 RX ORDER — SODIUM CHLORIDE 9 MG/ML
INJECTION, SOLUTION INTRAVENOUS PRN
Status: CANCELLED | OUTPATIENT
Start: 2024-07-31

## 2024-07-31 RX ORDER — SODIUM CHLORIDE 0.9 % (FLUSH) 0.9 %
5-40 SYRINGE (ML) INJECTION EVERY 12 HOURS SCHEDULED
Status: CANCELLED | OUTPATIENT
Start: 2024-07-31

## 2024-07-31 RX ORDER — SODIUM CHLORIDE 0.9 % (FLUSH) 0.9 %
5-40 SYRINGE (ML) INJECTION PRN
Status: CANCELLED | OUTPATIENT
Start: 2024-07-31

## 2024-07-31 NOTE — PROGRESS NOTES
EKG, Clearance, lab work, H&P all waiting to receive from [facility].  Pt not cleared for surgery on 8/1/2024 d/t lab work not resulted yet and robby will not clear until results available.  Notified pt and surgeon., per telephone.  bjp

## 2024-08-01 ENCOUNTER — ANESTHESIA (OUTPATIENT)
Dept: OPERATING ROOM | Age: 59
End: 2024-08-01
Payer: COMMERCIAL

## 2024-08-19 RX ORDER — SODIUM CHLORIDE 9 MG/ML
INJECTION, SOLUTION INTRAVENOUS PRN
Status: CANCELLED | OUTPATIENT
Start: 2024-08-19

## 2024-08-19 RX ORDER — SODIUM CHLORIDE 0.9 % (FLUSH) 0.9 %
5-40 SYRINGE (ML) INJECTION PRN
Status: CANCELLED | OUTPATIENT
Start: 2024-08-19

## 2024-08-19 RX ORDER — SODIUM CHLORIDE 0.9 % (FLUSH) 0.9 %
5-40 SYRINGE (ML) INJECTION EVERY 12 HOURS SCHEDULED
Status: CANCELLED | OUTPATIENT
Start: 2024-08-19

## 2024-08-20 ENCOUNTER — HOSPITAL ENCOUNTER (OUTPATIENT)
Age: 59
Setting detail: OUTPATIENT SURGERY
Discharge: HOME | End: 2024-08-20
Attending: PODIATRIST | Admitting: PODIATRIST
Payer: COMMERCIAL

## 2024-08-20 VITALS
RESPIRATION RATE: 16 BRPM | TEMPERATURE: 97.6 F | BODY MASS INDEX: 35.97 KG/M2 | HEIGHT: 63 IN | SYSTOLIC BLOOD PRESSURE: 110 MMHG | DIASTOLIC BLOOD PRESSURE: 56 MMHG | OXYGEN SATURATION: 98 % | HEART RATE: 50 BPM | WEIGHT: 203 LBS

## 2024-08-20 DIAGNOSIS — M79.661 PAIN IN RIGHT LOWER LEG: ICD-10-CM

## 2024-08-20 DIAGNOSIS — M67.88 ACHILLES TENDINOSIS OF RIGHT LOWER EXTREMITY: ICD-10-CM

## 2024-08-20 PROCEDURE — 3700000001 HC ADD 15 MINUTES (ANESTHESIA): Performed by: PODIATRIST

## 2024-08-20 PROCEDURE — 7100000001 HC PACU RECOVERY - ADDTL 15 MIN: Performed by: PODIATRIST

## 2024-08-20 PROCEDURE — 88304 TISSUE EXAM BY PATHOLOGIST: CPT

## 2024-08-20 PROCEDURE — 6360000002 HC RX W HCPCS: Performed by: ANESTHESIOLOGY

## 2024-08-20 PROCEDURE — 27654 REPAIR OF ACHILLES TENDON: CPT | Performed by: PODIATRIST

## 2024-08-20 PROCEDURE — 6360000002 HC RX W HCPCS: Performed by: PODIATRIST

## 2024-08-20 PROCEDURE — 6370000000 HC RX 637 (ALT 250 FOR IP): Performed by: ANESTHESIOLOGY

## 2024-08-20 PROCEDURE — 2580000003 HC RX 258: Performed by: PODIATRIST

## 2024-08-20 PROCEDURE — 3600000013 HC SURGERY LEVEL 3 ADDTL 15MIN: Performed by: PODIATRIST

## 2024-08-20 PROCEDURE — 2580000003 HC RX 258: Performed by: ANESTHESIOLOGY

## 2024-08-20 PROCEDURE — 2709999900 HC NON-CHARGEABLE SUPPLY: Performed by: PODIATRIST

## 2024-08-20 PROCEDURE — 7100000000 HC PACU RECOVERY - FIRST 15 MIN: Performed by: PODIATRIST

## 2024-08-20 PROCEDURE — 6360000002 HC RX W HCPCS: Performed by: NURSE ANESTHETIST, CERTIFIED REGISTERED

## 2024-08-20 PROCEDURE — 2720000010 HC SURG SUPPLY STERILE: Performed by: PODIATRIST

## 2024-08-20 PROCEDURE — 3700000000 HC ANESTHESIA ATTENDED CARE: Performed by: PODIATRIST

## 2024-08-20 PROCEDURE — 7100000010 HC PHASE II RECOVERY - FIRST 15 MIN: Performed by: PODIATRIST

## 2024-08-20 PROCEDURE — 3600000003 HC SURGERY LEVEL 3 BASE: Performed by: PODIATRIST

## 2024-08-20 PROCEDURE — 2580000003 HC RX 258: Performed by: NURSE ANESTHETIST, CERTIFIED REGISTERED

## 2024-08-20 PROCEDURE — 2500000003 HC RX 250 WO HCPCS: Performed by: NURSE ANESTHETIST, CERTIFIED REGISTERED

## 2024-08-20 PROCEDURE — 7100000011 HC PHASE II RECOVERY - ADDTL 15 MIN: Performed by: PODIATRIST

## 2024-08-20 DEVICE — GRAFT HUM TISS W30XL40MM THK0.5MM ACELLULAR DERM RM TEMP: Type: IMPLANTABLE DEVICE | Site: FOOT | Status: FUNCTIONAL

## 2024-08-20 RX ORDER — GLYCOPYRROLATE 0.2 MG/ML
INJECTION INTRAMUSCULAR; INTRAVENOUS PRN
Status: DISCONTINUED | OUTPATIENT
Start: 2024-08-20 | End: 2024-08-20 | Stop reason: SDUPTHER

## 2024-08-20 RX ORDER — SODIUM CHLORIDE, SODIUM LACTATE, POTASSIUM CHLORIDE, CALCIUM CHLORIDE 600; 310; 30; 20 MG/100ML; MG/100ML; MG/100ML; MG/100ML
INJECTION, SOLUTION INTRAVENOUS CONTINUOUS PRN
Status: DISCONTINUED | OUTPATIENT
Start: 2024-08-20 | End: 2024-08-20 | Stop reason: SDUPTHER

## 2024-08-20 RX ORDER — SODIUM CHLORIDE 9 MG/ML
INJECTION, SOLUTION INTRAVENOUS PRN
Status: DISCONTINUED | OUTPATIENT
Start: 2024-08-20 | End: 2024-08-20 | Stop reason: HOSPADM

## 2024-08-20 RX ORDER — FENTANYL CITRATE 50 UG/ML
INJECTION, SOLUTION INTRAMUSCULAR; INTRAVENOUS PRN
Status: DISCONTINUED | OUTPATIENT
Start: 2024-08-20 | End: 2024-08-20 | Stop reason: SDUPTHER

## 2024-08-20 RX ORDER — ONDANSETRON 2 MG/ML
INJECTION INTRAMUSCULAR; INTRAVENOUS PRN
Status: DISCONTINUED | OUTPATIENT
Start: 2024-08-20 | End: 2024-08-20 | Stop reason: SDUPTHER

## 2024-08-20 RX ORDER — LIDOCAINE HYDROCHLORIDE 20 MG/ML
INJECTION, SOLUTION INFILTRATION; PERINEURAL PRN
Status: DISCONTINUED | OUTPATIENT
Start: 2024-08-20 | End: 2024-08-20 | Stop reason: SDUPTHER

## 2024-08-20 RX ORDER — SODIUM CHLORIDE, SODIUM LACTATE, POTASSIUM CHLORIDE, CALCIUM CHLORIDE 600; 310; 30; 20 MG/100ML; MG/100ML; MG/100ML; MG/100ML
INJECTION, SOLUTION INTRAVENOUS CONTINUOUS
Status: DISCONTINUED | OUTPATIENT
Start: 2024-08-20 | End: 2024-08-20 | Stop reason: HOSPADM

## 2024-08-20 RX ORDER — FENTANYL CITRATE 0.05 MG/ML
50 INJECTION, SOLUTION INTRAMUSCULAR; INTRAVENOUS EVERY 5 MIN PRN
Status: DISCONTINUED | OUTPATIENT
Start: 2024-08-20 | End: 2024-08-20 | Stop reason: HOSPADM

## 2024-08-20 RX ORDER — ROCURONIUM BROMIDE 10 MG/ML
INJECTION, SOLUTION INTRAVENOUS PRN
Status: DISCONTINUED | OUTPATIENT
Start: 2024-08-20 | End: 2024-08-20 | Stop reason: SDUPTHER

## 2024-08-20 RX ORDER — SODIUM CHLORIDE 0.9 % (FLUSH) 0.9 %
5-40 SYRINGE (ML) INJECTION EVERY 12 HOURS SCHEDULED
Status: DISCONTINUED | OUTPATIENT
Start: 2024-08-20 | End: 2024-08-20 | Stop reason: HOSPADM

## 2024-08-20 RX ORDER — MIDAZOLAM HYDROCHLORIDE 1 MG/ML
INJECTION INTRAMUSCULAR; INTRAVENOUS PRN
Status: DISCONTINUED | OUTPATIENT
Start: 2024-08-20 | End: 2024-08-20 | Stop reason: SDUPTHER

## 2024-08-20 RX ORDER — DIPHENHYDRAMINE HYDROCHLORIDE 50 MG/ML
12.5 INJECTION INTRAMUSCULAR; INTRAVENOUS
Status: DISCONTINUED | OUTPATIENT
Start: 2024-08-20 | End: 2024-08-20 | Stop reason: HOSPADM

## 2024-08-20 RX ORDER — SODIUM CHLORIDE 0.9 % (FLUSH) 0.9 %
5-40 SYRINGE (ML) INJECTION PRN
Status: DISCONTINUED | OUTPATIENT
Start: 2024-08-20 | End: 2024-08-20 | Stop reason: HOSPADM

## 2024-08-20 RX ORDER — PROPOFOL 10 MG/ML
INJECTION, EMULSION INTRAVENOUS PRN
Status: DISCONTINUED | OUTPATIENT
Start: 2024-08-20 | End: 2024-08-20 | Stop reason: SDUPTHER

## 2024-08-20 RX ORDER — MEPERIDINE HYDROCHLORIDE 25 MG/ML
12.5 INJECTION INTRAMUSCULAR; INTRAVENOUS; SUBCUTANEOUS ONCE
Status: DISCONTINUED | OUTPATIENT
Start: 2024-08-20 | End: 2024-08-20 | Stop reason: HOSPADM

## 2024-08-20 RX ORDER — NALOXONE HYDROCHLORIDE 0.4 MG/ML
INJECTION, SOLUTION INTRAMUSCULAR; INTRAVENOUS; SUBCUTANEOUS PRN
Status: DISCONTINUED | OUTPATIENT
Start: 2024-08-20 | End: 2024-08-20 | Stop reason: HOSPADM

## 2024-08-20 RX ORDER — HYDROCODONE BITARTRATE AND ACETAMINOPHEN 5; 325 MG/1; MG/1
1 TABLET ORAL EVERY 6 HOURS PRN
Qty: 20 TABLET | Refills: 0 | Status: SHIPPED | OUTPATIENT
Start: 2024-08-20 | End: 2024-08-25

## 2024-08-20 RX ORDER — MORPHINE SULFATE 2 MG/ML
1 INJECTION, SOLUTION INTRAMUSCULAR; INTRAVENOUS EVERY 5 MIN PRN
Status: DISCONTINUED | OUTPATIENT
Start: 2024-08-20 | End: 2024-08-20 | Stop reason: HOSPADM

## 2024-08-20 RX ORDER — HYDROCODONE BITARTRATE AND ACETAMINOPHEN 5; 325 MG/1; MG/1
1 TABLET ORAL EVERY 6 HOURS PRN
Status: DISCONTINUED | OUTPATIENT
Start: 2024-08-20 | End: 2024-08-20 | Stop reason: HOSPADM

## 2024-08-20 RX ORDER — OXYCODONE AND ACETAMINOPHEN 5; 325 MG/1; MG/1
1 TABLET ORAL EVERY 4 HOURS PRN
Status: DISCONTINUED | OUTPATIENT
Start: 2024-08-20 | End: 2024-08-20 | Stop reason: HOSPADM

## 2024-08-20 RX ORDER — CEFAZOLIN SODIUM 1 G/3ML
INJECTION, POWDER, FOR SOLUTION INTRAMUSCULAR; INTRAVENOUS PRN
Status: DISCONTINUED | OUTPATIENT
Start: 2024-08-20 | End: 2024-08-20 | Stop reason: SDUPTHER

## 2024-08-20 RX ORDER — BUPIVACAINE HYDROCHLORIDE 5 MG/ML
INJECTION, SOLUTION EPIDURAL; INTRACAUDAL PRN
Status: DISCONTINUED | OUTPATIENT
Start: 2024-08-20 | End: 2024-08-20 | Stop reason: ALTCHOICE

## 2024-08-20 RX ORDER — DROPERIDOL 2.5 MG/ML
0.62 INJECTION, SOLUTION INTRAMUSCULAR; INTRAVENOUS
Status: DISCONTINUED | OUTPATIENT
Start: 2024-08-20 | End: 2024-08-20 | Stop reason: HOSPADM

## 2024-08-20 RX ADMIN — PROPOFOL 200 MG: 10 INJECTION, EMULSION INTRAVENOUS at 09:37

## 2024-08-20 RX ADMIN — CEFAZOLIN 2 G: 1 INJECTION, POWDER, FOR SOLUTION INTRAMUSCULAR; INTRAVENOUS at 09:33

## 2024-08-20 RX ADMIN — CEFAZOLIN 2000 MG: 2 INJECTION, POWDER, FOR SOLUTION INTRAMUSCULAR; INTRAVENOUS at 09:34

## 2024-08-20 RX ADMIN — ONDANSETRON 4 MG: 2 INJECTION INTRAMUSCULAR; INTRAVENOUS at 10:21

## 2024-08-20 RX ADMIN — ROCURONIUM BROMIDE 40 MG: 10 INJECTION, SOLUTION INTRAVENOUS at 09:37

## 2024-08-20 RX ADMIN — GLYCOPYRROLATE 0.2 MG: 0.2 INJECTION INTRAMUSCULAR; INTRAVENOUS at 09:37

## 2024-08-20 RX ADMIN — SODIUM CHLORIDE, POTASSIUM CHLORIDE, SODIUM LACTATE AND CALCIUM CHLORIDE: 600; 310; 30; 20 INJECTION, SOLUTION INTRAVENOUS at 08:59

## 2024-08-20 RX ADMIN — SUGAMMADEX 300 MG: 100 INJECTION, SOLUTION INTRAVENOUS at 10:31

## 2024-08-20 RX ADMIN — FENTANYL CITRATE 100 MCG: 50 INJECTION, SOLUTION INTRAMUSCULAR; INTRAVENOUS at 09:37

## 2024-08-20 RX ADMIN — SODIUM CHLORIDE, POTASSIUM CHLORIDE, SODIUM LACTATE AND CALCIUM CHLORIDE: 600; 310; 30; 20 INJECTION, SOLUTION INTRAVENOUS at 09:33

## 2024-08-20 RX ADMIN — HYDROCODONE BITARTRATE AND ACETAMINOPHEN 1 TABLET: 5; 325 TABLET ORAL at 11:44

## 2024-08-20 RX ADMIN — HYDROMORPHONE HYDROCHLORIDE 0.5 MG: 1 INJECTION, SOLUTION INTRAMUSCULAR; INTRAVENOUS; SUBCUTANEOUS at 11:06

## 2024-08-20 RX ADMIN — LIDOCAINE HYDROCHLORIDE 100 MG: 20 INJECTION, SOLUTION INFILTRATION; PERINEURAL at 09:37

## 2024-08-20 RX ADMIN — FENTANYL CITRATE 50 MCG: 0.05 INJECTION, SOLUTION INTRAMUSCULAR; INTRAVENOUS at 10:45

## 2024-08-20 RX ADMIN — MIDAZOLAM 1 MG: 1 INJECTION INTRAMUSCULAR; INTRAVENOUS at 09:33

## 2024-08-20 ASSESSMENT — PAIN DESCRIPTION - LOCATION
LOCATION: FOOT

## 2024-08-20 ASSESSMENT — PAIN DESCRIPTION - ORIENTATION
ORIENTATION: RIGHT

## 2024-08-20 ASSESSMENT — PAIN SCALES - GENERAL
PAINLEVEL_OUTOF10: 10
PAINLEVEL_OUTOF10: 7
PAINLEVEL_OUTOF10: 8

## 2024-08-20 ASSESSMENT — PAIN - FUNCTIONAL ASSESSMENT
PAIN_FUNCTIONAL_ASSESSMENT: 0-10

## 2024-08-20 ASSESSMENT — PAIN DESCRIPTION - DESCRIPTORS
DESCRIPTORS: SQUEEZING
DESCRIPTORS: PRESSURE;SQUEEZING
DESCRIPTORS: ACHING;BURNING

## 2024-08-20 NOTE — ANESTHESIA POSTPROCEDURE EVALUATION
Department of Anesthesiology  Postprocedure Note    Patient: Quynh Gold  MRN: [id number]  YOB: 1965  Date of evaluation: 8/20/2024    Procedure Summary       Date: 08/20/24 Room / Location: [address]    Anesthesia Start: 0933 Anesthesia Stop: 1043    Procedure: Repair Achilles Tendon with Grafting and PRP Augmentation Right Lower Extremity- 1 hr (Right) Diagnosis:       Achilles tendinosis of right lower extremity      Pain in right lower leg      (Achilles tendinosis of right lower extremity [M67.88])      (Pain in right lower leg [M79.661])    Surgeons: Moshe Short Jr., DPM Responsible Provider: Remi Harding MD    Anesthesia Type: general ASA Status: 3            Anesthesia Type: No value filed.    Umesh Phase I: Umesh Score: 10    Umesh Phase II: Umesh Score: 10    Anesthesia Post Evaluation    Patient location during evaluation: PACU  Patient participation: complete - patient participated  Level of consciousness: awake  Airway patency: patent  Nausea & Vomiting: no nausea and no vomiting  Cardiovascular status: hemodynamically stable  Respiratory status: room air and spontaneous ventilation  Hydration status: stable  Pain management: adequate (pain Rx in PACU)    No notable events documented.

## 2024-08-20 NOTE — H&P
Update History & Physical     The patient's History and Physical this morning was reviewed with the patient and there were no significant changes.     I examined the patient and there were no significant changes from the previous History and Physical.     Plan: The risk, benefits, expected outcome, and alternative to the recommended procedure have been discussed with the patient.  Patient understands and wants to proceed with the procedure. The procedure discussed is 1.  Repair Achilles tendon right lower extremity with grafting and PRP augmentation        Electronically signed by Moshe Short Jr, DPM on 8/20/2024 at 9:16 AM

## 2024-08-20 NOTE — OP NOTE
Operative Note      Patient: Quynh Gold  YOB: 1965  MRN: [id number]    Date of Procedure: 8/20/2024    Pre-Op Diagnosis Codes:      * Achilles tendinosis of right lower extremity [M67.88]     * Pain in right lower leg [M79.661]    Post-Op Diagnosis: Same       Procedure(s):  Repair Achilles Tendon with Grafting and PRP Augmentation Right Lower Extremity- 1 hr    Surgeon(s):  Moshe Short Jr., DPM    Assistant:   * No surgical staff found *    Anesthesia: General    Estimated Blood Loss (mL): Minimal    Complications: None    Specimens:   ID Type Source Tests Collected by Time Destination   A : TENDON RIGHT LOWER EXTERMITY Tissue Tissue SURGICAL PATHOLOGY Moshe Short Jr., DPM 8/20/2024 1008        Implants:  Implant Name Type Inv. Item Serial No.  Lot No. LRB No. Used Action   GRAFT HUM TISS [id number] THK0.5MM ACELLULAR DERM  TEMP - [id number]  GRAFT HUM TISS [id number] THK0.5MM ACELLULAR DERM  TEMP [id number] [facility] TISS[facility]-  Right 1 Implanted         Drains: * No LDAs found *    Findings:  Infection Present At Time Of Surgery (PATOS) (choose all levels that have infection present):  No infection present  Other Findings: Chronic Achilles tendinosis right lower extremity.  Patient did have previous retrocalcaneal exostectomy and Achilles tendon repair several years ago with retained hardware.  We did discuss with patient that this is a salvage procedure and we would repair the soft tissue abnormalities from the initial procedure.  No guarantees were made on outcome.    Detailed Description of Procedure:   After proper preoperative evaluation, patient was brought back to the operating room placed the operating table initially in the supine position.  General anesthesia was administered per anesthesia department.  Patient was then properly placed in the prone position.  Patient did receive 2 g intravenous Ancef preoperatively.  Tourniquet was  placed around the patient's well-padded right thigh inflated to 325 mmHg then lowered to the surgical field once proper prepping and draping was performed.  Attention was directed towards the posterior right heel region where at this time approximately a 4 cm linear incision was made to gain access to the Achilles insertional region.  All vital structures during the dissection were properly bovied and retracted as necessary.  Noted thickening/tendinosis distal Achilles without tear noted.  No tendon issues noted in the watershed region.  At this time with sharp dissection the tendon Atik Achilles tendon region was sharply excised, with specimen sent to pathology for examination.  Surgical site was flushed with copious amounts of saline.  At this time with use of 2-0 Vicryl we did read tubularized the distal Achilles tendon back to physiologic tension and dimensions, in a running stitch fashion.  At this time we did utilize the Arthrex D cell utilized dermal graft as an onlay application overlying the repair site.  2-0 Vicryl was utilized to incorporate the graft into the underlying tendon repair site.  At this time we did procure 5 mL of platelet rich plasma from the patient which was infiltrated into the repair site and surrounding soft tissues.  Subcutaneous closure was performed with 4-0 Vicryl in an interrupted fashion.  Epidermal closure was performed with 3-0 nylon in a continuous interlocking fashion.  We did utilize a total of 20 cc of 0.5% Marcaine plain to anesthetize surgical site right lower extremity.  Betadine soaked Adaptic and dry padded dressing was then applied right lower extremity.  Tourniquet was released with total tourniquet time of 39 minutes noted, good vascularity was reestablished to the right foot and digital regions.  The patient tolerated the procedure and anesthesia well and left the operating room in stable condition.  The patient is being transported to the recovery room for post

## 2024-08-20 NOTE — DISCHARGE INSTRUCTIONS
DR. MARCSO'S AMBULATORY PROCEDURE DISCHARGE INSTRUCTIONS  You may be drowsy or lightheaded after receiving sedation or anesthesia.    A responsible person should be with you for the next 24 hours.    Please follow the instructions checked below:    DIET INSTRUCTIONS:  [x]Start with light diet and progress to your normal diet as you feel like eating. If you experience nausea or repeated episodes of vomiting which persist beyond 12-24 hours, notify your doctor.  []Other     ACTIVITY INSTRUCTIONS:  [x]Rest today. Increase activity as tolerated    [x]Elevate operative limb   []Sling to operative limb  []No heavy lifting or strenuous activity     []No driving for ***   []Use crutches  []Use walker   [x]Weight bearing: [x]none /  []partial / []full as tolerated   []Other ***    WOUND/DRESSING INSTRUCTIONS:  Always ensure you and your care giver clean hands before and after caring for the wound.  []May shower      []May bathe      [x]Keep dressing dry            [x]Do not remove dressing   []Remove dressing on     []Leave steri strips in place    []Drain care      [x] Ice to operative site for 15-30 minutes of each hour while awake for 24-36 hours  []Use ice cooling system as instructed                  []Post-op Shoe-wear when up and about                     [x]Other: Please use knee scooter with all ambulatory activities right lower extremity.  No weight on right lower extremity.     MEDICATION INSTRUCTIONS:    [x]Prescriptions sent with you.  Use as directed.  When taking pain medications, you may experience dizziness or drowsiness.  Do not drink alcohol or drive when taking these medications.  [x]You may take a non-prescription anti-inflammatory medication as needed for pain relief.  [x]Give the list of your medications to your primary care physician on your next visit. Keep your med list updated and carry it with in case of emergency  Other Instructions:                  FOLLOW-UP CARE:  [x]Call the office at  professional. [employer] disclaims any warranty or liability for your use of this information.       Infection After Surgery: Care Instructions  Overview  After surgery, an infection is always possible. It doesn't mean that the surgery didn't go well.  Because an infection can be serious, your doctor has taken steps to manage it.  Your doctor checked the infection and cleaned it if necessary. Your doctor may have made an opening in the area so that the pus can drain out. You may have gauze in the cut so that the area will stay open and keep draining. You may need antibiotics.  You will need to follow up with your doctor to make sure the infection has gone away.  Follow-up care is a key part of your treatment and safety. Be sure to make and go to all appointments, and call your doctor if you are having problems. It's also a good idea to know your test results and keep a list of the medicines you take.  How can you care for yourself at home?  Make sure your surgeon knows about the infection, especially if you saw another doctor about your symptoms.  If your doctor prescribed antibiotics, take them as directed. Do not stop taking them just because you feel better. You need to take the full course of antibiotics.  Ask your doctor if you can take an over-the-counter pain medicine, such as acetaminophen (Tylenol), ibuprofen (Advil, Motrin), or naproxen (Aleve). Be safe with medicines. Read and follow all instructions on the label.  Do not take two or more pain medicines at the same time unless the doctor told you to. Many pain medicines have acetaminophen, which is Tylenol. Too much acetaminophen (Tylenol) can be harmful.  Prop up the area on a pillow anytime you sit or lie down during the next 3 days. Try to keep it above the level of your heart. This will help reduce swelling.  Keep the skin clean and dry.  You may have a bandage over the cut (incision). A bandage helps the incision heal and protects it.

## 2024-08-22 ENCOUNTER — OFFICE VISIT (OUTPATIENT)
Dept: PODIATRY | Age: 59
End: 2024-08-22

## 2024-08-22 VITALS — HEIGHT: 63 IN | BODY MASS INDEX: 35.97 KG/M2 | WEIGHT: 203 LBS

## 2024-08-22 DIAGNOSIS — M67.88 ACHILLES TENDINOSIS OF RIGHT LOWER EXTREMITY: Primary | ICD-10-CM

## 2024-08-22 PROCEDURE — 99024 POSTOP FOLLOW-UP VISIT: CPT | Performed by: PODIATRIST

## 2024-08-22 NOTE — PROGRESS NOTES
24     Quynh Gold    : 1965   Sex: female    Age: 58 y.o.    Patient's PCP/Provider is:  Hugh Renteria PA    Subjective:  Patient is seen today for follow-up regarding continued postop management Achilles tendon repair right lower extremity.  Overall patient is doing well at this time with minimal issues noted.  She is getting a slight throbbing at the surgical site right lower extremity.  Patient has been utilizing the offloading boot and knee scooter as instructed.  Patient denies any additional issues at this time.    Chief Complaint   Patient presents with    Post-Op Check     Achilles tendonosis of right lower extremity       ROS:  Const: Positives and pertinent negatives as per HPI.    Musculo: Denies symptoms other than stated above.  Neuro: Denies symptoms other than stated above.  Skin: Denies symptoms other than stated above.    Current Medications:    Current Outpatient Medications:     HYDROcodone-acetaminophen (NORCO) 5-325 MG per tablet, Take 1 tablet by mouth every 6 hours as needed for Pain for up to 5 days. Intended supply: 5 days. Take lowest dose possible to manage pain Max Daily Amount: 4 tablets, Disp: 20 tablet, Rfl: 0    topiramate (TOPAMAX) 25 MG tablet, Take 1 tablet by mouth 2 times daily Takes 50mg for PM dose, Disp: , Rfl:     phentermine 15 MG capsule, Take 1 capsule by mouth every morning., Disp: , Rfl:     atorvastatin (LIPITOR) 40 MG tablet, take 1 tablet by mouth once daily, Disp: 90 tablet, Rfl: 1    metoprolol succinate (TOPROL XL) 25 MG extended release tablet, take 1 tablet by mouth once daily (Patient taking differently: Take 1 tablet by mouth at bedtime), Disp: 90 tablet, Rfl: 1    cephALEXin (KEFLEX) 500 MG capsule, Take 4 capsules by mouth daily as needed (take 2 g, 1 hour prior to dental procedure), Disp: 4 capsule, Rfl: 3    busPIRone (BUSPAR) 10 MG tablet, take 1 tablet by mouth twice a day, Disp: , Rfl:     aspirin (PX ENTERIC ASPIRIN) 81 MG EC

## 2024-08-22 NOTE — PROGRESS NOTES
Patient here for post-op right achilles tendonosis. Surgery 8/20/2024. Hugh Renteria PA   Electronically signed by Macarena Gar LPN on 8/22/2024 at 1:42 PM

## 2024-08-27 LAB — SURGICAL PATHOLOGY REPORT: NORMAL

## 2024-08-29 ENCOUNTER — OFFICE VISIT (OUTPATIENT)
Dept: PODIATRY | Age: 59
End: 2024-08-29

## 2024-08-29 VITALS — WEIGHT: 203 LBS | HEIGHT: 63 IN | BODY MASS INDEX: 35.97 KG/M2

## 2024-08-29 DIAGNOSIS — M67.88 ACHILLES TENDINOSIS OF RIGHT LOWER EXTREMITY: Primary | ICD-10-CM

## 2024-08-29 PROCEDURE — 99024 POSTOP FOLLOW-UP VISIT: CPT | Performed by: PODIATRIST

## 2024-08-29 NOTE — PROGRESS NOTES
24     Quynh Gold    : 1965   Sex: female    Age: 58 y.o.    Patient's PCP/Provider is:  Hugh Renteria PA    Subjective:  Patient is seen today for follow-up regarding continued postop management Achilles tendon repair right lower extremity.  Overall patient is doing well at this time and pleased with current postoperative and wearing the offloading boot and utilizing the knee scooter as instructed.  Patient has been limiting her daily activities as recommended.  No other additional abnormalities noted.    Chief Complaint   Patient presents with    Post-Op Check     Right achilles        ROS:  Const: Positives and pertinent negatives as per HPI.    Musculo: Denies symptoms other than stated above.  Neuro: Denies symptoms other than stated above.  Skin: Denies symptoms other than stated above.    Current Medications:    Current Outpatient Medications:     topiramate (TOPAMAX) 25 MG tablet, Take 1 tablet by mouth 2 times daily Takes 50mg for PM dose, Disp: , Rfl:     phentermine 15 MG capsule, Take 1 capsule by mouth every morning., Disp: , Rfl:     atorvastatin (LIPITOR) 40 MG tablet, take 1 tablet by mouth once daily, Disp: 90 tablet, Rfl: 1    metoprolol succinate (TOPROL XL) 25 MG extended release tablet, take 1 tablet by mouth once daily (Patient taking differently: Take 1 tablet by mouth at bedtime), Disp: 90 tablet, Rfl: 1    cephALEXin (KEFLEX) 500 MG capsule, Take 4 capsules by mouth daily as needed (take 2 g, 1 hour prior to dental procedure), Disp: 4 capsule, Rfl: 3    busPIRone (BUSPAR) 10 MG tablet, take 1 tablet by mouth twice a day, Disp: , Rfl:     aspirin (PX ENTERIC ASPIRIN) 81 MG EC tablet, Take 1 tablet by mouth daily, Disp: 30 tablet, Rfl: 3    Allergies:  No Known Allergies    Vitals:    24 1345   Weight: 92.1 kg (203 lb)   Height: 1.6 m (5' 2.99\")       Exam:  Neurovascular status unchanged.  Surgical site stable with sutures intact right lower extremity.  No signs

## 2024-08-29 NOTE — PROGRESS NOTES
Patient is in today for 1 week post op of right achilles. Patient says she is doing well and not having any issues. Pcp is Hugh Renteria PA  Last ov 8/13/24

## 2024-09-05 ENCOUNTER — OFFICE VISIT (OUTPATIENT)
Dept: PODIATRY | Age: 59
End: 2024-09-05

## 2024-09-05 VITALS — BODY MASS INDEX: 35.97 KG/M2 | HEIGHT: 63 IN | WEIGHT: 203 LBS

## 2024-09-05 DIAGNOSIS — M67.88 ACHILLES TENDINOSIS OF RIGHT LOWER EXTREMITY: Primary | ICD-10-CM

## 2024-09-05 PROCEDURE — 99024 POSTOP FOLLOW-UP VISIT: CPT | Performed by: PODIATRIST

## 2024-09-05 NOTE — PROGRESS NOTES
Patient is in today for 1 week post op of right achilles. Patient is doing well with no issues. Pcp is Hugh Renteria PA  Last ov 8/13/24

## 2024-09-05 NOTE — PROGRESS NOTES
24     Quynh Gold    : 1965   Sex: female    Age: 58 y.o.    Patient's PCP/Provider is:  Hugh Renteria PA    Subjective:  Patient is seen today for follow-up regarding continued care tendon repair right lower extremity.  Overall patient is doing well at this time with minimal issues noted.  She is utilizing her knee walker and cam walker as instructed.  Patient does have upcoming physical therapy tomorrow as scheduled.  Patient is pleased with current care and surgical outcome at this point in time.  No other additional abnormalities noted.    Chief Complaint   Patient presents with    Post-Op Check     Right achilles        ROS:  Const: Positives and pertinent negatives as per HPI.    Musculo: Denies symptoms other than stated above.  Neuro: Denies symptoms other than stated above.  Skin: Denies symptoms other than stated above.    Current Medications:    Current Outpatient Medications:     topiramate (TOPAMAX) 25 MG tablet, Take 1 tablet by mouth 2 times daily Takes 50mg for PM dose, Disp: , Rfl:     phentermine 15 MG capsule, Take 1 capsule by mouth every morning., Disp: , Rfl:     atorvastatin (LIPITOR) 40 MG tablet, take 1 tablet by mouth once daily, Disp: 90 tablet, Rfl: 1    metoprolol succinate (TOPROL XL) 25 MG extended release tablet, take 1 tablet by mouth once daily (Patient taking differently: Take 1 tablet by mouth at bedtime), Disp: 90 tablet, Rfl: 1    cephALEXin (KEFLEX) 500 MG capsule, Take 4 capsules by mouth daily as needed (take 2 g, 1 hour prior to dental procedure), Disp: 4 capsule, Rfl: 3    busPIRone (BUSPAR) 10 MG tablet, take 1 tablet by mouth twice a day, Disp: , Rfl:     aspirin (PX ENTERIC ASPIRIN) 81 MG EC tablet, Take 1 tablet by mouth daily, Disp: 30 tablet, Rfl: 3    Allergies:  No Known Allergies    Vitals:    24 1456   Weight: 92.1 kg (203 lb)   Height: 1.6 m (5' 2.99\")       Exam:  Neurovascular status grossly intact right lower extremity.  Surgical

## 2024-09-09 ENCOUNTER — TELEPHONE (OUTPATIENT)
Dept: PHYSICAL THERAPY | Age: 59
End: 2024-09-09

## 2024-09-12 ENCOUNTER — EVALUATION (OUTPATIENT)
Dept: PHYSICAL THERAPY | Age: 59
End: 2024-09-12

## 2024-09-12 DIAGNOSIS — M67.88 ACHILLES TENDINOSIS OF RIGHT LOWER EXTREMITY: Primary | ICD-10-CM

## 2024-09-16 ENCOUNTER — TREATMENT (OUTPATIENT)
Dept: PHYSICAL THERAPY | Age: 59
End: 2024-09-16
Payer: COMMERCIAL

## 2024-09-16 DIAGNOSIS — M67.88 ACHILLES TENDONOSIS OF RIGHT LOWER EXTREMITY: Primary | ICD-10-CM

## 2024-09-16 PROCEDURE — 97110 THERAPEUTIC EXERCISES: CPT

## 2024-09-27 ENCOUNTER — TREATMENT (OUTPATIENT)
Dept: PHYSICAL THERAPY | Age: 59
End: 2024-09-27

## 2024-09-27 DIAGNOSIS — M67.88 ACHILLES TENDONOSIS OF RIGHT LOWER EXTREMITY: Primary | ICD-10-CM

## 2024-09-30 ENCOUNTER — TREATMENT (OUTPATIENT)
Dept: PHYSICAL THERAPY | Age: 59
End: 2024-09-30
Payer: COMMERCIAL

## 2024-09-30 DIAGNOSIS — M67.88 ACHILLES TENDONOSIS OF RIGHT LOWER EXTREMITY: Primary | ICD-10-CM

## 2024-09-30 PROCEDURE — 97110 THERAPEUTIC EXERCISES: CPT

## 2024-09-30 NOTE — PROGRESS NOTES
Physical Therapy Daily Treatment Note    Date: 2024  Patient Name: Quynh Gold  : 1965   MRN: [id number]  DOInjury: 2024   DOSx: 24  Referring Provider: No referring provider defined for this encounter.     Medical Diagnosis:      Diagnosis Orders   1. Achilles tendonosis of right lower extremity            Outcome Measure:          X = TO BE PERFORMED NEXT VISIT  > = PROGRESS TO THIS    S: Pt reports soreness in back of calf and over incision. States incision is still seeping.   O: I took picture of patients incision with patients consent. Will keep to see changes this visit to next. Pt is WBAT in boot. Doffed boot to perform exercises seated and supine.  Time 5784-7862     Visit 2/ Repeat outcome measure at mid point and end.    Pain Pain at rest 0/10  Pain with activity 5/10     ROM      Modalities         MO   Manual            Stretch      Towel / strap DF, INV, EV   TE      TE   Exercise      Ball / can rolling X 20  TE   Toe curls      AROM PF/DF 2 x 20     AROM CW/CCW 2 x 20 ea     T band DF, ER, IR, PF Blue x 20 ea DIr     BAPS seated L1 2 x 20 PF/DF, CW/CCW           LAQ 3 x 10     Heel slides Seated 2 x 15  TE   QS   TE   SLR 3 x 10  TE   SAQ   TE   [] TG  [] Leg Press 2-leg   TE   [] TG  [] Leg Press 1-leg   TE   Hamstring Curl Machine   TE   Knee Extension Machine   TE   Step-ups - FWD   TA   Step-ups - LAT   TA   Step-ups - BWD   TA   Calf Raises   TA      TA      TA               A:  Tolerated well.  All NWB exercises performed without boot. Pt given blue theraband for HEP  P: Continue with rehab plan  Silke Shah PTA    Treatment Charges: Mins Units   Initial Evaluation     Re-Evaluation     Ther Exercise         TE 30 2   Manual Therapy     MT     Ther Activities        TA     Gait Training          GT     Neuro Re-education NR     Modalities     Non-Billable Service Time 10 0   Other     Total Time/Units 40 2

## 2024-10-02 ENCOUNTER — TREATMENT (OUTPATIENT)
Dept: PHYSICAL THERAPY | Age: 59
End: 2024-10-02
Payer: COMMERCIAL

## 2024-10-02 DIAGNOSIS — M67.88 ACHILLES TENDONOSIS OF RIGHT LOWER EXTREMITY: Primary | ICD-10-CM

## 2024-10-02 PROCEDURE — 97110 THERAPEUTIC EXERCISES: CPT

## 2024-10-02 NOTE — PROGRESS NOTES
Physical Therapy Daily Treatment Note    Date: 10/2/2024  Patient Name: Quynh Gold  : 1965   MRN: [id number]  DOInjury: 2024   DOSx: 24  Referring Provider: No referring provider defined for this encounter.     Medical Diagnosis:      Diagnosis Orders   1. Achilles tendonosis of right lower extremity              Outcome Measure:          X = TO BE PERFORMED NEXT VISIT  > = PROGRESS TO THIS    S: Pt reports soreness in back of calf and over incision. States incision is still seeping.   O: I took picture of patients incision with patients consent. Will keep to see changes this visit to next. Pt is WBAT in boot. Doffed boot to perform exercises seated and supine.  Time 1902-1542     Visit 3/ Repeat outcome measure at mid point and end.    Pain Pain at rest 0/10  Pain with activity 5/10     ROM      Modalities         MO   Manual            Stretch      Towel / strap DF, INV, EV   TE      TE   Exercise      Ball / can rolling X 20  TE   Toe curls      AROM PF/DF 2 x 20     AROM CW/CCW 2 x 20 ea     T band DF, ER, IR, PF Blue x 20 ea DIr     BAPS seated           LAQ 2# 3 x 10     Heel slides Seated 2 x 15  TE   QS   TE   SLR 2# 3 x 10  TE   SAQ   TE   [] TG  [] Leg Press 2-leg   TE   [] TG  [] Leg Press 1-leg   TE   Hamstring Curl Machine   TE   Knee Extension Machine   TE   Step-ups - FWD   TA   Step-ups - LAT   TA   Step-ups - BWD   TA   Calf Raises   TA      TA   Marching  2 x 50 ft  TA   Side stepping 2 x 50 ft  TA         A:  Tolerated well.  All NWB exercises performed without boot. Added weight to exercises above.  P: Continue with rehab plan  Silke Shah PTA    Treatment Charges: Mins Units   Initial Evaluation     Re-Evaluation     Ther Exercise         TE 30 2   Manual Therapy     MT     Ther Activities        TA     Gait Training          GT     Neuro Re-education NR     Modalities     Non-Billable Service Time     Other     Total Time/Units 30 2

## 2024-10-10 ENCOUNTER — OFFICE VISIT (OUTPATIENT)
Dept: PODIATRY | Age: 59
End: 2024-10-10

## 2024-10-10 ENCOUNTER — TREATMENT (OUTPATIENT)
Dept: PHYSICAL THERAPY | Age: 59
End: 2024-10-10
Payer: COMMERCIAL

## 2024-10-10 VITALS — BODY MASS INDEX: 35.97 KG/M2 | WEIGHT: 203 LBS

## 2024-10-10 DIAGNOSIS — M67.88 ACHILLES TENDINOSIS OF RIGHT LOWER EXTREMITY: Primary | ICD-10-CM

## 2024-10-10 PROCEDURE — 97530 THERAPEUTIC ACTIVITIES: CPT

## 2024-10-10 PROCEDURE — 99024 POSTOP FOLLOW-UP VISIT: CPT | Performed by: PODIATRIST

## 2024-10-10 RX ORDER — TRIAMCINOLONE ACETONIDE 1 MG/G
CREAM TOPICAL
Qty: 90 G | Refills: 2 | Status: SHIPPED | OUTPATIENT
Start: 2024-10-10

## 2024-10-10 NOTE — PROGRESS NOTES
10/10/24     Quynh Gold    : 1965   Sex: female    Age: 58 y.o.    Patient's PCP/Provider is:  Hugh Renteria PA    Subjective:  Patient is seen today for follow-up regarding continued care regarding postop management Achilles tendon repair right lower extremity.  Patient still presents wearing the cam walker.  Patient missed her last scheduled appointment.  Patient is getting some irritation to the surrounding incisional area posterior right heel.  Patient denies any additional abnormalities at this time.  Initial symptoms have improved since surgical procedure right.  Patient is pleased with current surgical outcome at this time.  No other additional abnormalities noted.    Chief Complaint   Patient presents with    Post-Op Check     Achilles tendinosis of right lower extremity         ROS:  Const: Positives and pertinent negatives as per HPI.    Musculo: Denies symptoms other than stated above.  Neuro: Denies symptoms other than stated above.  Skin: Denies symptoms other than stated above.    Current Medications:    Current Outpatient Medications:     topiramate (TOPAMAX) 25 MG tablet, Take 1 tablet by mouth 2 times daily Takes 50mg for PM dose, Disp: , Rfl:     phentermine 15 MG capsule, Take 1 capsule by mouth every morning., Disp: , Rfl:     atorvastatin (LIPITOR) 40 MG tablet, take 1 tablet by mouth once daily, Disp: 90 tablet, Rfl: 1    metoprolol succinate (TOPROL XL) 25 MG extended release tablet, take 1 tablet by mouth once daily (Patient taking differently: Take 1 tablet by mouth at bedtime), Disp: 90 tablet, Rfl: 1    cephALEXin (KEFLEX) 500 MG capsule, Take 4 capsules by mouth daily as needed (take 2 g, 1 hour prior to dental procedure), Disp: 4 capsule, Rfl: 3    busPIRone (BUSPAR) 10 MG tablet, take 1 tablet by mouth twice a day, Disp: , Rfl:     aspirin (PX ENTERIC ASPIRIN) 81 MG EC tablet, Take 1 tablet by mouth daily, Disp: 30 tablet, Rfl: 3    Allergies:  No Known

## 2024-10-10 NOTE — PROGRESS NOTES
Physical Therapy Daily Treatment Note    Date: 10/10/2024  Patient Name: Quynh Gold  : 1965   MRN: [id number]  DOInjury: 2024   DOSx: 24  Referring Provider: Moshe Short Jr., DPM  [address] [place] [address]       Medical Diagnosis:      Diagnosis Orders   1. Achilles tendinosis of right lower extremity                Outcome Measure:          X = TO BE PERFORMED NEXT VISIT  > = PROGRESS TO THIS    S: Pt reports d/cd from boot per physician. Pt given pad for back of heel so incision does not rub on shoe  O: pt d/cd from boot.   Time 9903-3541     Visit 5/ Repeat outcome measure at mid point and end.    Pain Pain at rest 0/10  Pain with activity 5/10     ROM      Modalities         MO   Manual            Stretch      Towel / strap DF, INV, EV   TE      TE   Exercise      Ball / can rolling  TE   Toe curls     AROM PF/DF     AROM CW/CCW     T band DF, ER, IR, PF     BAPS seated           LAQ 2# 3 x 10     Heel slides  TE   QS   TE   SLR  TE   SAQ   TE   marching X 20 ea LE     Alt hip Abd X 20 ea LE     [x] TG  [] Leg Press 2-leg NEXT  TE   [] TG  [] Leg Press 1-leg   TE   Hamstring Curl Machine   TE   Knee Extension Machine   TE   Step-ups - FWD 7\" x 15  TA   Step-ups - LAT 7\" x 15  TA   Step-ups - BWD   TA   Calf Raises NEXT  TA      TA   Marching  2 x 50 ft  TA   Side stepping 2 x 50 ft  TA         A:  Tolerated well.  Performed all exercises out of boot  P: Continue with rehab plan  Silke Shah PTA    Treatment Charges: Mins Units   Initial Evaluation     Re-Evaluation     Ther Exercise         TE     Manual Therapy     MT     Ther Activities        TA 30 2   Gait Training          GT     Neuro Re-education NR     Modalities     Non-Billable Service Time     Other     Total Time/Units 30 2

## 2024-10-10 NOTE — PROGRESS NOTES
Pt presents to office today for post op check of the right achilles tendinosis.  Pt states that the back of foot is still very tender to the touch of anything.  Dr Renteria unknown

## 2024-10-17 ENCOUNTER — TREATMENT (OUTPATIENT)
Dept: PHYSICAL THERAPY | Age: 59
End: 2024-10-17

## 2024-10-17 DIAGNOSIS — M67.88 ACHILLES TENDINOSIS OF RIGHT LOWER EXTREMITY: Primary | ICD-10-CM

## 2024-10-17 DIAGNOSIS — M67.88 ACHILLES TENDONOSIS OF RIGHT LOWER EXTREMITY: ICD-10-CM

## 2024-10-17 NOTE — PROGRESS NOTES
Physical Therapy Daily Treatment Note    Date: 10/17/2024  Patient Name: Quynh Gold  : 1965   MRN: [id number]  DOInjury: 2024   DOSx: 24  Referring Provider: Moshe Short Jr., DPM  [address] [place] [address]       Medical Diagnosis:      Diagnosis Orders   1. Achilles tendinosis of right lower extremity        2. Achilles tendonosis of right lower extremity          Outcome Measure:          X = TO BE PERFORMED NEXT VISIT  > = PROGRESS TO THIS    S: Pt reports feeling okay today, but fibromyalgia has been acting up due to cold weather  O: pt d/cd from boot.   Time 1300-     Visit - Repeat outcome measure at mid point and end.    Pain Pain at rest 0/10  Pain with activity 5/10     ROM      Modalities         MO   Manual            Stretch      Towel / strap DF, INV, EV   TE      TE   Exercise      Nustep L5 x 8 min     Ball / can rolling  TE   Toe curls     AROM PF/DF     AROM CW/CCW     T band DF, ER, IR, PF     BAPS seated           LAQ 5# 3 x 10     Heel slides  TE   QS   TE   SLR  TE   SAQ   TE   marching AIREX X 20 ea LE     Alt hip Abd AIREX X 20 ea LE     [x] TG  [] Leg Press 2-leg NEXT  TE   [] TG  [] Leg Press 1-leg   TE   Hamstring Curl Machine   TE   Knee Extension Machine   TE   Step-ups - FWD 7\" x 20  TA   Step-ups - LAT 7\" x 20  TA   Step-ups - BWD   TA   Calf Raises 2 x 15  TA      TA   Marching  2 x 50 ft  TA   Side stepping 2 x 50 ft  TA         A:  Tolerated well. Progressed balance and added Nustep for endurance  P: Continue with rehab plan  Silke Shah PTA    Treatment Charges: Mins Units   Initial Evaluation     Re-Evaluation     Ther Exercise         TE     Manual Therapy     MT     Ther Activities        TA 30 2   Gait Training          GT     Neuro Re-education NR     Modalities     Non-Billable Service Time     Other     Total Time/Units 30 2

## 2024-10-24 ENCOUNTER — OFFICE VISIT (OUTPATIENT)
Dept: PODIATRY | Age: 59
End: 2024-10-24

## 2024-10-24 VITALS — BODY MASS INDEX: 37.36 KG/M2 | WEIGHT: 203 LBS | HEIGHT: 62 IN

## 2024-10-24 DIAGNOSIS — M67.88 ACHILLES TENDINOSIS OF RIGHT LOWER EXTREMITY: Primary | ICD-10-CM

## 2024-10-24 PROCEDURE — 99024 POSTOP FOLLOW-UP VISIT: CPT | Performed by: PODIATRIST

## 2024-10-24 NOTE — PROGRESS NOTES
10/24/24     Quynh Gold    : 1965   Sex: female    Age: 58 y.o.    Patient's PCP/Provider is:  Hugh Renteria PA    Subjective:  Patient is seen today for follow-up regarding continued care regarding Achilles repair right lower extremity.  Overall patient is doing well at this time and very pleased with surgical outcome.  She is back in her regular shoe gear and performing daily activities to tolerance.  Patient has been wearing her good supportive shoe gear and performing her daily stretching and strengthening exercises as discussed in therapy.  No other additional abnormalities noted at this time.  Patient is pleased with surgical care and outcome.    Chief Complaint   Patient presents with    Post-Op Check     Achilles tendonitis of right lower extremity       ROS:  Const: Positives and pertinent negatives as per HPI.    Musculo: Denies symptoms other than stated above.  Neuro: Denies symptoms other than stated above.  Skin: Denies symptoms other than stated above.    Current Medications:    Current Outpatient Medications:     triamcinolone (KENALOG) 0.1 % cream, Apply topically 2 times daily., Disp: 90 g, Rfl: 2    topiramate (TOPAMAX) 25 MG tablet, Take 1 tablet by mouth 2 times daily Takes 50mg for PM dose, Disp: , Rfl:     phentermine 15 MG capsule, Take 1 capsule by mouth every morning., Disp: , Rfl:     atorvastatin (LIPITOR) 40 MG tablet, take 1 tablet by mouth once daily, Disp: 90 tablet, Rfl: 1    metoprolol succinate (TOPROL XL) 25 MG extended release tablet, take 1 tablet by mouth once daily (Patient taking differently: Take 1 tablet by mouth at bedtime), Disp: 90 tablet, Rfl: 1    cephALEXin (KEFLEX) 500 MG capsule, Take 4 capsules by mouth daily as needed (take 2 g, 1 hour prior to dental procedure), Disp: 4 capsule, Rfl: 3    busPIRone (BUSPAR) 10 MG tablet, take 1 tablet by mouth twice a day, Disp: , Rfl:     aspirin (PX ENTERIC ASPIRIN) 81 MG EC tablet, Take 1 tablet by mouth

## 2024-10-24 NOTE — PROGRESS NOTES
Patient here for post-op achilles tendonitis of right lower extremity. Surgery date 8/20/2024 Hugh Renteria PA   Electronically signed by Macarena Gar LPN on 10/24/2024 at 1:33 PM

## 2024-11-20 DIAGNOSIS — Z87.74 S/P PERCUTANEOUS PATENT FORAMEN OVALE CLOSURE: ICD-10-CM

## 2024-11-20 RX ORDER — ATORVASTATIN CALCIUM 40 MG/1
40 TABLET, FILM COATED ORAL DAILY
Qty: 90 TABLET | Refills: 1 | Status: SHIPPED | OUTPATIENT
Start: 2024-11-20

## 2024-11-20 RX ORDER — METOPROLOL SUCCINATE 25 MG/1
25 TABLET, EXTENDED RELEASE ORAL DAILY
Qty: 90 TABLET | Refills: 1 | Status: SHIPPED | OUTPATIENT
Start: 2024-11-20

## 2024-12-16 ENCOUNTER — APPOINTMENT (OUTPATIENT)
Dept: CT IMAGING | Age: 59
End: 2024-12-16
Payer: COMMERCIAL

## 2024-12-16 ENCOUNTER — HOSPITAL ENCOUNTER (EMERGENCY)
Age: 59
Discharge: HOME | End: 2024-12-16
Payer: COMMERCIAL

## 2024-12-16 VITALS
RESPIRATION RATE: 16 BRPM | WEIGHT: 204 LBS | DIASTOLIC BLOOD PRESSURE: 75 MMHG | BODY MASS INDEX: 37.31 KG/M2 | OXYGEN SATURATION: 97 % | SYSTOLIC BLOOD PRESSURE: 126 MMHG | TEMPERATURE: 97.3 F | HEART RATE: 60 BPM

## 2024-12-16 DIAGNOSIS — N20.0 NEPHROLITHIASIS: ICD-10-CM

## 2024-12-16 DIAGNOSIS — N39.0 URINARY TRACT INFECTION WITH HEMATURIA, SITE UNSPECIFIED: Primary | ICD-10-CM

## 2024-12-16 DIAGNOSIS — R31.9 URINARY TRACT INFECTION WITH HEMATURIA, SITE UNSPECIFIED: Primary | ICD-10-CM

## 2024-12-16 LAB
ALBUMIN SERPL-MCNC: 4.2 G/DL (ref 3.5–5.2)
ALP SERPL-CCNC: 89 U/L (ref 35–104)
ALT SERPL-CCNC: 13 U/L (ref 0–32)
ANION GAP SERPL CALCULATED.3IONS-SCNC: 10 MMOL/L (ref 7–16)
AST SERPL-CCNC: 20 U/L (ref 0–31)
BACTERIA URNS QL MICRO: ABNORMAL
BASOPHILS # BLD: 0.01 K/UL (ref 0–0.2)
BASOPHILS NFR BLD: 0 % (ref 0–2)
BILIRUB SERPL-MCNC: 0.4 MG/DL (ref 0–1.2)
BILIRUB UR QL STRIP: NEGATIVE
BUN SERPL-MCNC: 15 MG/DL (ref 6–23)
CALCIUM SERPL-MCNC: 10 MG/DL (ref 8.6–10.2)
CHLORIDE SERPL-SCNC: 105 MMOL/L (ref 98–107)
CLARITY UR: ABNORMAL
CO2 SERPL-SCNC: 27 MMOL/L (ref 22–29)
COLOR UR: YELLOW
CREAT SERPL-MCNC: 0.6 MG/DL (ref 0.5–1)
EOSINOPHIL # BLD: 0.13 K/UL (ref 0.05–0.5)
EOSINOPHILS RELATIVE PERCENT: 2 % (ref 0–6)
ERYTHROCYTE [DISTWIDTH] IN BLOOD BY AUTOMATED COUNT: 13.9 % (ref 11.5–15)
GFR, ESTIMATED: >90 ML/MIN/1.73M2
GLUCOSE SERPL-MCNC: 87 MG/DL (ref 74–99)
GLUCOSE UR STRIP-MCNC: 100 MG/DL
HCT VFR BLD AUTO: 36.3 % (ref 34–48)
HGB BLD-MCNC: 12.2 G/DL (ref 11.5–15.5)
HGB UR QL STRIP.AUTO: NEGATIVE
IMM GRANULOCYTES # BLD AUTO: <0.03 K/UL (ref 0–0.58)
IMM GRANULOCYTES NFR BLD: 0 % (ref 0–5)
KETONES UR STRIP-MCNC: ABNORMAL MG/DL
LACTATE BLDV-SCNC: 0.7 MMOL/L (ref 0.5–1.9)
LACTATE BLDV-SCNC: 1.1 MMOL/L (ref 0.5–1.9)
LEUKOCYTE ESTERASE UR QL STRIP: ABNORMAL
LYMPHOCYTES NFR BLD: 1.86 K/UL (ref 1.5–4)
LYMPHOCYTES RELATIVE PERCENT: 34 % (ref 20–42)
MCH RBC QN AUTO: 30.3 PG (ref 26–35)
MCHC RBC AUTO-ENTMCNC: 33.6 G/DL (ref 32–34.5)
MCV RBC AUTO: 90.1 FL (ref 80–99.9)
MONOCYTES NFR BLD: 0.45 K/UL (ref 0.1–0.95)
MONOCYTES NFR BLD: 8 % (ref 2–12)
NEUTROPHILS NFR BLD: 55 % (ref 43–80)
NEUTS SEG NFR BLD: 2.98 K/UL (ref 1.8–7.3)
NITRITE UR QL STRIP: POSITIVE
PH UR STRIP: 7 [PH] (ref 5–9)
PLATELET # BLD AUTO: 236 K/UL (ref 130–450)
PMV BLD AUTO: 10.1 FL (ref 7–12)
POTASSIUM SERPL-SCNC: 4.1 MMOL/L (ref 3.5–5)
PROT SERPL-MCNC: 7.4 G/DL (ref 6.4–8.3)
PROT UR STRIP-MCNC: 30 MG/DL
RBC # BLD AUTO: 4.03 M/UL (ref 3.5–5.5)
RBC #/AREA URNS HPF: ABNORMAL /HPF
SODIUM SERPL-SCNC: 142 MMOL/L (ref 132–146)
SP GR UR STRIP: 1.02 (ref 1–1.03)
UROBILINOGEN UR STRIP-ACNC: 4 EU/DL (ref 0–1)
WBC #/AREA URNS HPF: ABNORMAL /HPF
WBC OTHER # BLD: 5.5 K/UL (ref 4.5–11.5)

## 2024-12-16 PROCEDURE — 83605 ASSAY OF LACTIC ACID: CPT

## 2024-12-16 PROCEDURE — 74177 CT ABD & PELVIS W/CONTRAST: CPT

## 2024-12-16 PROCEDURE — 87040 BLOOD CULTURE FOR BACTERIA: CPT

## 2024-12-16 PROCEDURE — 80053 COMPREHEN METABOLIC PANEL: CPT

## 2024-12-16 PROCEDURE — 87088 URINE BACTERIA CULTURE: CPT

## 2024-12-16 PROCEDURE — 6360000002 HC RX W HCPCS

## 2024-12-16 PROCEDURE — 6360000004 HC RX CONTRAST MEDICATION: Performed by: RADIOLOGY

## 2024-12-16 PROCEDURE — 2580000003 HC RX 258

## 2024-12-16 PROCEDURE — 87086 URINE CULTURE/COLONY COUNT: CPT

## 2024-12-16 PROCEDURE — 81001 URINALYSIS AUTO W/SCOPE: CPT

## 2024-12-16 PROCEDURE — 99285 EMERGENCY DEPT VISIT HI MDM: CPT

## 2024-12-16 PROCEDURE — 85025 COMPLETE CBC W/AUTO DIFF WBC: CPT

## 2024-12-16 PROCEDURE — 96375 TX/PRO/DX INJ NEW DRUG ADDON: CPT

## 2024-12-16 PROCEDURE — 96374 THER/PROPH/DIAG INJ IV PUSH: CPT

## 2024-12-16 RX ORDER — ONDANSETRON 2 MG/ML
4 INJECTION INTRAMUSCULAR; INTRAVENOUS ONCE
Status: COMPLETED | OUTPATIENT
Start: 2024-12-16 | End: 2024-12-16

## 2024-12-16 RX ORDER — IBUPROFEN 600 MG/1
600 TABLET, FILM COATED ORAL 4 TIMES DAILY PRN
Qty: 40 TABLET | Refills: 0 | Status: SHIPPED | OUTPATIENT
Start: 2024-12-16

## 2024-12-16 RX ORDER — 0.9 % SODIUM CHLORIDE 0.9 %
1000 INTRAVENOUS SOLUTION INTRAVENOUS ONCE
Status: COMPLETED | OUTPATIENT
Start: 2024-12-16 | End: 2024-12-16

## 2024-12-16 RX ORDER — KETOROLAC TROMETHAMINE 15 MG/ML
15 INJECTION, SOLUTION INTRAMUSCULAR; INTRAVENOUS ONCE
Status: COMPLETED | OUTPATIENT
Start: 2024-12-16 | End: 2024-12-16

## 2024-12-16 RX ORDER — IOPAMIDOL 755 MG/ML
70 INJECTION, SOLUTION INTRAVASCULAR
Status: COMPLETED | OUTPATIENT
Start: 2024-12-16 | End: 2024-12-16

## 2024-12-16 RX ORDER — CEFDINIR 300 MG/1
300 CAPSULE ORAL 2 TIMES DAILY
Qty: 14 CAPSULE | Refills: 0 | Status: SHIPPED | OUTPATIENT
Start: 2024-12-16 | End: 2024-12-23

## 2024-12-16 RX ORDER — ONDANSETRON 4 MG/1
4 TABLET, ORALLY DISINTEGRATING ORAL 3 TIMES DAILY PRN
Qty: 21 TABLET | Refills: 0 | Status: SHIPPED | OUTPATIENT
Start: 2024-12-16

## 2024-12-16 RX ORDER — PHENAZOPYRIDINE HYDROCHLORIDE 100 MG/1
100 TABLET, FILM COATED ORAL 3 TIMES DAILY PRN
Qty: 9 TABLET | Refills: 0 | Status: SHIPPED | OUTPATIENT
Start: 2024-12-16 | End: 2024-12-19

## 2024-12-16 RX ADMIN — SODIUM CHLORIDE 1000 ML: 9 INJECTION, SOLUTION INTRAVENOUS at 19:15

## 2024-12-16 RX ADMIN — KETOROLAC TROMETHAMINE 15 MG: 15 INJECTION, SOLUTION INTRAMUSCULAR; INTRAVENOUS at 19:10

## 2024-12-16 RX ADMIN — WATER 2000 MG: 1 INJECTION INTRAMUSCULAR; INTRAVENOUS; SUBCUTANEOUS at 22:48

## 2024-12-16 RX ADMIN — ONDANSETRON 4 MG: 2 INJECTION, SOLUTION INTRAMUSCULAR; INTRAVENOUS at 19:12

## 2024-12-16 RX ADMIN — IOPAMIDOL 70 ML: 755 INJECTION, SOLUTION INTRAVENOUS at 19:50

## 2024-12-16 ASSESSMENT — PAIN DESCRIPTION - DESCRIPTORS: DESCRIPTORS: ACHING;CRAMPING;STABBING

## 2024-12-16 ASSESSMENT — PAIN SCALES - GENERAL: PAINLEVEL_OUTOF10: 8

## 2024-12-16 ASSESSMENT — PAIN DESCRIPTION - LOCATION: LOCATION: GROIN;FLANK

## 2024-12-16 ASSESSMENT — LIFESTYLE VARIABLES
HOW OFTEN DO YOU HAVE A DRINK CONTAINING ALCOHOL: MONTHLY OR LESS
HOW MANY STANDARD DRINKS CONTAINING ALCOHOL DO YOU HAVE ON A TYPICAL DAY: 1 OR 2

## 2024-12-16 NOTE — ED NOTES
Department of Emergency Medicine  FIRST PROVIDER TRIAGE NOTE             Independent MLP           12/16/24  3:33 PM EST    Date of Encounter: 12/16/24   MRN: [id number]      HPI: Quynh Gold is a 59 y.o. female who presents to the ED for Urinary Retention (Dysuria since last Wednesday, taking azo, urinating small amounts, c/o fatigue and chills, c/o right flank and right groin pain)       ROS: Negative for vomiting or diarrhea.    PE: Gen Appearance/Constitutional: alert  HEENT: NC/NT. PERRLA,  Airway patent.  Neck: supple  CV: regular rate  Pulm: CTA bilat  GI: soft Tender RLQ, Right CVA tenderness  Musculoskeletal: moves all extremities x 4  Lymphatics: no edema    Initial Plan of Care: All treatment areas with department are currently occupied.      Plan to order/Initiate the following while awaiting opening in ED: Triage evaluation  .     Provider-Patient relationship only established for Provider In Triage (PIT).  Full assessment, HPI and examination not performed, therefore, it is not yet possible to state whether or not an emergency medical condition exists     Initial Plan of Care: Initiate Treatment-Testing, Proceed toTreatment Area When Bed Available for ED Attending/MLP to Continue Care  Secondary to high volume, low staffing, and/or boarding- patient to await bed availability.     This ends my PIT-Patient relationship.  Care of patient relinquished after triage         Electronically signed by RUFUS Cartagena CNP   DD: 12/16/24

## 2024-12-16 NOTE — ED PROVIDER NOTES
Independent WOOD Visit     [facility]  Department of Emergency Medicine   ED  Encounter Note  Admit Date/RoomTime: 2024  5:58 PM  ED Room:     NAME: Quynh Gold  : 1965  MRN: [id number]     Chief Complaint:  Urinary Retention (Dysuria since last Wednesday, taking azo, urinating small amounts, c/o fatigue and chills, c/o right flank and right groin pain)    History of Present Illness   History provided by the patient and chart review    Quynh Gold is a 59 y.o. old female who presents to the emergency department by private vehicle, for evaluation of dysuria and right flank and groin pain.  She started with dysuria 1 week ago followed by the right flank pain, urinating small amounts, fatigue, chills, and nausea for the last few days.she is concerned she has UTI.  Since onset the symptoms have been gradually worsening and moderate in severity.  Symptoms are associated with no additional symptoms.  She denies  constipation, diarrhea, fever,  hematochezia, melena, myalgias, vomiting, vaginal discharge, and vaginal bleeding.  She has a history of kidney stones and sepsis post surgical intervention for kidney stone.    ROS   Pertinent positives and negatives are stated within HPI, all other systems reviewed and are negative.    Past Medical History:  has a past medical history of Acid reflux, Anxiety, Arthritis, Back pain, Benign neoplasm of ovary, Blood transfusion, Carotid stenosis, Cerebral artery occlusion with cerebral infarction (HCC), Fibromyalgia, History of blood transfusion, Hyperlipidemia, Kidney stone, Lupus, Pelvic peritoneal adhesions, female (postoperative) (postinfection), Right leg pain, Seizure (HCC), Sepsis (HCC), Spinal headache, Unspecified symptom associated with female genital organs, and Ureteral obstruction.    Surgical History:  has a past surgical history that includes joint replacement (Right, pt had it 3 times (revisions)); Breast surgery (lt breast

## 2024-12-18 ENCOUNTER — OFFICE VISIT (OUTPATIENT)
Dept: CARDIOLOGY CLINIC | Age: 59
End: 2024-12-18
Payer: COMMERCIAL

## 2024-12-18 VITALS
RESPIRATION RATE: 18 BRPM | SYSTOLIC BLOOD PRESSURE: 98 MMHG | WEIGHT: 211 LBS | DIASTOLIC BLOOD PRESSURE: 60 MMHG | HEART RATE: 63 BPM | HEIGHT: 63 IN | BODY MASS INDEX: 37.39 KG/M2

## 2024-12-18 DIAGNOSIS — Z87.74 STATUS POST PATENT FORAMEN OVALE CLOSURE: Primary | ICD-10-CM

## 2024-12-18 DIAGNOSIS — R00.2 PALPITATION: ICD-10-CM

## 2024-12-18 DIAGNOSIS — G47.33 OSA (OBSTRUCTIVE SLEEP APNEA): Primary | ICD-10-CM

## 2024-12-18 DIAGNOSIS — R06.89 GASPING FOR BREATH: ICD-10-CM

## 2024-12-18 DIAGNOSIS — R06.83 SNORING: ICD-10-CM

## 2024-12-18 DIAGNOSIS — R00.2 PALPITATIONS: ICD-10-CM

## 2024-12-18 LAB
MICROORGANISM SPEC CULT: ABNORMAL
SERVICE CMNT-IMP: ABNORMAL
SPECIMEN DESCRIPTION: ABNORMAL

## 2024-12-18 PROCEDURE — G8427 DOCREV CUR MEDS BY ELIG CLIN: HCPCS | Performed by: INTERNAL MEDICINE

## 2024-12-18 PROCEDURE — 1036F TOBACCO NON-USER: CPT | Performed by: INTERNAL MEDICINE

## 2024-12-18 PROCEDURE — 93000 ELECTROCARDIOGRAM COMPLETE: CPT | Performed by: INTERNAL MEDICINE

## 2024-12-18 PROCEDURE — G8417 CALC BMI ABV UP PARAM F/U: HCPCS | Performed by: INTERNAL MEDICINE

## 2024-12-18 PROCEDURE — G8484 FLU IMMUNIZE NO ADMIN: HCPCS | Performed by: INTERNAL MEDICINE

## 2024-12-18 PROCEDURE — 3017F COLORECTAL CA SCREEN DOC REV: CPT | Performed by: INTERNAL MEDICINE

## 2024-12-18 PROCEDURE — 99214 OFFICE O/P EST MOD 30 MIN: CPT | Performed by: INTERNAL MEDICINE

## 2024-12-18 ASSESSMENT — ENCOUNTER SYMPTOMS
VOMITING: 0
BLOOD IN STOOL: 0
BACK PAIN: 0
CONSTIPATION: 0
SHORTNESS OF BREATH: 0
ABDOMINAL PAIN: 0
WHEEZING: 0
NAUSEA: 0
COUGH: 0
DIARRHEA: 0

## 2024-12-18 NOTE — PROGRESS NOTES
Echocardiogram done on 9/15/2020:  No Echo indication of Endocarditis.  Normal left ventricular chamber size.  Normal left ventricular systolic function, EF 65%.  Mild left ventricular concentric hypertrophy noted.  Stage II diastolic dysfunction.  Left atrium is enlarged.  Increased left atrial volume.  PFO closure device noted. Hx of PFO closure with 25-mm Amplatzer occluder.  Doppler interrogation showed possible small left to right interatrial shunt.  Agitated saline injection showed some right to left interatrial shunt.  Normal right ventricle size and function.  Mildly enlarged right atrium.  There is trace mitral regurgitation.  No mitral valve prolapse.  No hemodynamically significant aortic stenosis.  Normal aortic root size.  No evidence of pericardial effusion.  No intra cardiac mass or thrombus.  Compared to prior echo from 7/30/2021.    Echocardiogram done on 1/31/2024:  Technically good study  Bubble study was done  Absence of right to left interatrial shunting by bubble study with Valsalva  Absence of significant valvular heart disease  Ejection fraction 55 to 60%    2 weeks ZIO XT monitor done on 1/31/2024:  Sinus rhythm with average heart rate 82 bpm  1 run of SVT of 4 beats at 167 bpm  Less than 1% SVE's and VE's    ASSESSMENT / PLAN:  -Status post PFO closure with no evidence of right-to-left shunt by recent echocardiogram.  She has been on aspirin.  -Palpitations at night during sleep.  They could be due to obstructive sleep apnea.  -Lightheadedness: Due to low normal blood pressure.  -Status post cryptogenic TIA.  -Minimal carotid artery disease.  -History of PVCs.  -Questionable lupus.  -Fibromyalgia.  -Chronic pain syndrome.  -History of seizures.  -Status post COVID-19 infection.  -Benign neoplasm of the ovary.  -Obesity.    I will continue patient on her current cardiac medications  Patient was advised to remain hydrated and to avoid exposure to heat, hot shower, hot tub or

## 2024-12-21 LAB
MICROORGANISM SPEC CULT: NORMAL
MICROORGANISM SPEC CULT: NORMAL
SERVICE CMNT-IMP: NORMAL
SERVICE CMNT-IMP: NORMAL
SPECIMEN DESCRIPTION: NORMAL
SPECIMEN DESCRIPTION: NORMAL

## 2024-12-30 ENCOUNTER — HOSPITAL ENCOUNTER (OUTPATIENT)
Dept: MAMMOGRAPHY | Age: 59
Discharge: HOME | End: 2025-01-01
Payer: COMMERCIAL

## 2024-12-30 VITALS — HEIGHT: 63 IN | WEIGHT: 202 LBS | BODY MASS INDEX: 35.79 KG/M2

## 2024-12-30 DIAGNOSIS — Z12.31 OTHER SCREENING MAMMOGRAM: ICD-10-CM

## 2024-12-30 PROCEDURE — 77063 BREAST TOMOSYNTHESIS BI: CPT

## 2025-06-09 ENCOUNTER — HOSPITAL ENCOUNTER (OUTPATIENT)
Age: 60
Discharge: HOME | End: 2025-06-11

## 2025-06-09 ENCOUNTER — HOSPITAL ENCOUNTER (OUTPATIENT)
Dept: GENERAL RADIOLOGY | Age: 60
Discharge: HOME | End: 2025-06-11

## 2025-06-09 DIAGNOSIS — M54.50 LOW BACK PAIN, UNSPECIFIED BACK PAIN LATERALITY, UNSPECIFIED CHRONICITY, UNSPECIFIED WHETHER SCIATICA PRESENT: ICD-10-CM

## 2025-06-09 PROCEDURE — 72100 X-RAY EXAM L-S SPINE 2/3 VWS: CPT

## 2025-08-22 ENCOUNTER — APPOINTMENT (OUTPATIENT)
Dept: GENERAL RADIOLOGY | Age: 60
End: 2025-08-22

## 2025-08-22 ENCOUNTER — HOSPITAL ENCOUNTER (EMERGENCY)
Age: 60
Discharge: HOME | End: 2025-08-22
Attending: EMERGENCY MEDICINE

## 2025-08-22 VITALS
HEART RATE: 51 BPM | RESPIRATION RATE: 20 BRPM | OXYGEN SATURATION: 98 % | BODY MASS INDEX: 34.37 KG/M2 | WEIGHT: 194 LBS | SYSTOLIC BLOOD PRESSURE: 124 MMHG | DIASTOLIC BLOOD PRESSURE: 64 MMHG

## 2025-08-22 DIAGNOSIS — R07.9 CHEST PAIN, UNSPECIFIED TYPE: Primary | ICD-10-CM

## 2025-08-22 LAB
ANION GAP SERPL CALCULATED.3IONS-SCNC: 9 MMOL/L (ref 7–16)
BASOPHILS # BLD: 0.02 K/UL (ref 0–0.2)
BASOPHILS NFR BLD: 0 % (ref 0–2)
BUN SERPL-MCNC: 20 MG/DL (ref 8–23)
CALCIUM SERPL-MCNC: 9.5 MG/DL (ref 8.8–10.2)
CHLORIDE SERPL-SCNC: 107 MMOL/L (ref 98–107)
CO2 SERPL-SCNC: 24 MMOL/L (ref 22–29)
CREAT SERPL-MCNC: 0.6 MG/DL (ref 0.5–1)
EKG ATRIAL RATE: 53 BPM
EKG P AXIS: 36 DEGREES
EKG P-R INTERVAL: 126 MS
EKG Q-T INTERVAL: 444 MS
EKG QRS DURATION: 80 MS
EKG QTC CALCULATION (BAZETT): 416 MS
EKG R AXIS: 59 DEGREES
EKG T AXIS: 47 DEGREES
EKG VENTRICULAR RATE: 53 BPM
EOSINOPHIL # BLD: 0.19 K/UL (ref 0.05–0.5)
EOSINOPHILS RELATIVE PERCENT: 3 % (ref 0–6)
ERYTHROCYTE [DISTWIDTH] IN BLOOD BY AUTOMATED COUNT: 14 % (ref 11.5–15)
GFR, ESTIMATED: >90 ML/MIN/1.73M2
GLUCOSE SERPL-MCNC: 92 MG/DL (ref 74–99)
HCT VFR BLD AUTO: 35.3 % (ref 34–48)
HGB BLD-MCNC: 11.7 G/DL (ref 11.5–15.5)
IMM GRANULOCYTES # BLD AUTO: <0.03 K/UL (ref 0–0.58)
IMM GRANULOCYTES NFR BLD: 0 % (ref 0–5)
LYMPHOCYTES NFR BLD: 1.4 K/UL (ref 1.5–4)
LYMPHOCYTES RELATIVE PERCENT: 24 % (ref 20–42)
MCH RBC QN AUTO: 29.3 PG (ref 26–35)
MCHC RBC AUTO-ENTMCNC: 33.1 G/DL (ref 32–34.5)
MCV RBC AUTO: 88.3 FL (ref 80–99.9)
MONOCYTES NFR BLD: 0.53 K/UL (ref 0.1–0.95)
MONOCYTES NFR BLD: 9 % (ref 2–12)
NEUTROPHILS NFR BLD: 64 % (ref 43–80)
NEUTS SEG NFR BLD: 3.79 K/UL (ref 1.8–7.3)
PLATELET # BLD AUTO: 237 K/UL (ref 130–450)
PMV BLD AUTO: 10.8 FL (ref 7–12)
POTASSIUM SERPL-SCNC: 4.2 MMOL/L (ref 3.5–5.1)
RBC # BLD AUTO: 4 M/UL (ref 3.5–5.5)
SODIUM SERPL-SCNC: 140 MMOL/L (ref 136–145)
TROPONIN I SERPL HS-MCNC: <6 NG/L (ref 0–14)
TROPONIN I SERPL HS-MCNC: <6 NG/L (ref 0–14)
WBC OTHER # BLD: 6 K/UL (ref 4.5–11.5)

## 2025-08-22 PROCEDURE — 93005 ELECTROCARDIOGRAM TRACING: CPT | Performed by: EMERGENCY MEDICINE

## 2025-08-22 PROCEDURE — 93010 ELECTROCARDIOGRAM REPORT: CPT | Performed by: INTERNAL MEDICINE

## 2025-08-22 PROCEDURE — 6370000000 HC RX 637 (ALT 250 FOR IP): Performed by: EMERGENCY MEDICINE

## 2025-08-22 PROCEDURE — 80048 BASIC METABOLIC PNL TOTAL CA: CPT

## 2025-08-22 PROCEDURE — 71045 X-RAY EXAM CHEST 1 VIEW: CPT

## 2025-08-22 PROCEDURE — 85025 COMPLETE CBC W/AUTO DIFF WBC: CPT

## 2025-08-22 PROCEDURE — 99285 EMERGENCY DEPT VISIT HI MDM: CPT

## 2025-08-22 PROCEDURE — 84484 ASSAY OF TROPONIN QUANT: CPT

## 2025-08-22 RX ORDER — ASPIRIN 81 MG/1
243 TABLET, CHEWABLE ORAL ONCE
Status: COMPLETED | OUTPATIENT
Start: 2025-08-22 | End: 2025-08-22

## 2025-08-22 RX ADMIN — ASPIRIN 243 MG: 81 TABLET, CHEWABLE ORAL at 14:16

## 2025-08-22 ASSESSMENT — LIFESTYLE VARIABLES
HOW MANY STANDARD DRINKS CONTAINING ALCOHOL DO YOU HAVE ON A TYPICAL DAY: 1 OR 2
HOW OFTEN DO YOU HAVE A DRINK CONTAINING ALCOHOL: MONTHLY OR LESS

## (undated) DEVICE — NEPTUNE E-SEP SMOKE EVACUATION PENCIL, COATED, 70MM BLADE, PUSH BUTTON SWITCH: Brand: NEPTUNE E-SEP

## (undated) DEVICE — CYSTO PACK: Brand: MEDLINE INDUSTRIES, INC.

## (undated) DEVICE — GLOVE ORANGE PI 8   MSG9080

## (undated) DEVICE — GOWN,SIRUS,FABRNF,XL,20/CS: Brand: MEDLINE

## (undated) DEVICE — SOLUTION IRRIG 1000ML 09% SOD CHL USP PIC PLAS CONTAINER

## (undated) DEVICE — SYRINGE,EAR/ULCER, 2 OZ, STERILE: Brand: MEDLINE

## (undated) DEVICE — NEEDLE HYPO 18GA L1.5IN PNK POLYPR HUB S STL REG BVL STR

## (undated) DEVICE — ELECTRODE ELECSURG 2 PLATE AD 10 FT 33 LB PT RET MEGADYNE

## (undated) DEVICE — STRIP SKIN CLSR W1XL5IN NYL REINF CURAD

## (undated) DEVICE — SYSTEM PRP DBL SYR W/ CAP FOR AUTOLGS PLSM SYS ACP

## (undated) DEVICE — SUTURE VICRYL SZ 4-0 L27IN ABSRB VLT L17MM RB-1 1/2 CIR J304H

## (undated) DEVICE — BLADE SAW SAG OSCIL LNG MED 9X31MM

## (undated) DEVICE — 3M™ SYNTHETIC CAST STOCKINET, MS04, 4 INCH X 25 YARD (10.1CM X 22.8M), 1 ROLL/CASE: Brand: 3M™

## (undated) DEVICE — SOLUTION IRRIG 1000ML STRL H2O USP PLAS POUR BTL

## (undated) DEVICE — BANDAGE,GAUZE,BULKEE II,4.5"X4.1YD,STRL: Brand: MEDLINE

## (undated) DEVICE — GUIDEWIRE VASC STR 3 CM 0.035 INX150 CM STD NIT ZIPWIRE

## (undated) DEVICE — COVER,LIGHT HANDLE,FLX,2/PK: Brand: MEDLINE INDUSTRIES, INC.

## (undated) DEVICE — GAUZE,SPONGE,4"X4",16PLY,XRAY,STRL,LF: Brand: MEDLINE

## (undated) DEVICE — GARMENT,MEDLINE,DVT,INT,CALF,MED, GEN2: Brand: MEDLINE

## (undated) DEVICE — BAG DRNGE COMB PK

## (undated) DEVICE — SOLUTION IRRIG 3000ML 0.9% SOD CHL USP UROMATIC PLAS CONT

## (undated) DEVICE — GOWN,SIRUS,NONRNF,SETINSLV,XL,20/CS: Brand: MEDLINE

## (undated) DEVICE — GLOVE ORANGE PI 7 1/2   MSG9075

## (undated) DEVICE — GLOVE,SURG,SENSICARE,ALOE,LF,PF,7: Brand: MEDLINE

## (undated) DEVICE — ELECTRODE PT RET AD L9FT HI MOIST COND ADH HYDRGEL CORDED

## (undated) DEVICE — GOWN,SIRUS,POLYRNF,BRTHSLV,XLN/XXL,18/CS: Brand: MEDLINE

## (undated) DEVICE — TOWEL,OR,DSP,ST,BLUE,STD,6/PK,12PK/CS: Brand: MEDLINE

## (undated) DEVICE — BANDAGE COMPR W4INXL5YD WHT BGE POLY COT M E WRP WV HK AND

## (undated) DEVICE — DISPOSABLE TOURNIQUET CUFF SINGLE BLADDER, DUAL PORT AND QUICK CONNECT CONNECTOR: Brand: COLOR CUFF

## (undated) DEVICE — SUTURE PDS II SZ 2-0 L27IN ABSRB VLT SH L26MM 1/2 CIR Z317H

## (undated) DEVICE — BANDAGE COMPR W6INXL12FT SMOOTH FOR LIMB EXSANG ESMARCH

## (undated) DEVICE — BANDAGE,GAUZE,4.5"X4.1YD,STERILE,LF: Brand: MEDLINE

## (undated) DEVICE — BLADE,STAINLESS-STEEL,15,STRL,DISPOSABLE: Brand: MEDLINE

## (undated) DEVICE — GLOVE SURG SZ 85 L12IN FNGR THK94MIL STD WHT LTX FREE

## (undated) DEVICE — GLOVE SURG SZ 65 L12IN FNGR THK94MIL STD WHT LTX FREE

## (undated) DEVICE — BASIC SINGLE BASIN 1-LF: Brand: MEDLINE INDUSTRIES, INC.

## (undated) DEVICE — BASIC PACK: Brand: CONVERTORS

## (undated) DEVICE — PADDING CAST W6INXL4YD NONSTERILE WHT SYN FBR NONABSORBENT

## (undated) DEVICE — TUBING, SUCTION, 1/4" X 10', STRAIGHT: Brand: MEDLINE

## (undated) DEVICE — PEN: MARKING STD 100/CS: Brand: MEDICAL ACTION INDUSTRIES

## (undated) DEVICE — BLADE SAW LONG WIDE 34.5X16.5X.38MM

## (undated) DEVICE — GOWN,SIRUS,NON REINFRCD,LARGE,SET IN SL: Brand: MEDLINE

## (undated) DEVICE — SOLUTION IRRIG 3000ML STRL H2O USP UROMATIC PLAS CONT

## (undated) DEVICE — SUTURE NONABSORBABLE MONOFILAMENT 4-0 PS-2 18 IN BLK ETHILON 1667G

## (undated) DEVICE — APPLICATOR MEDICATED 26 CC SOLUTION HI LT ORNG CHLORAPREP

## (undated) DEVICE — TUBING, SUCTION, 3/16" X 10', STRAIGHT: Brand: MEDLINE

## (undated) DEVICE — GUIDEWIRE URO L150CM DIA0.035IN TAPR 3CM NIT HYDRPHLC ANG

## (undated) DEVICE — COUNTER NDL 10 COUNT HLD 20 FOAM BLK SGL MAG

## (undated) DEVICE — 4-PORT MANIFOLD: Brand: NEPTUNE 2

## (undated) DEVICE — TAPE CAST W4INXL4YD WHT RESIN FBRGLS H2O ACT TACK FREE

## (undated) DEVICE — NEEDLE HYPO 25GA L1.5IN BLU POLYPR HUB S STL REG BVL STR

## (undated) DEVICE — DOUBLE BASIN SET: Brand: MEDLINE INDUSTRIES, INC.

## (undated) DEVICE — PADDING,UNDERCAST,COTTON, 4"X4YD STERILE: Brand: MEDLINE

## (undated) DEVICE — SOLUTION IRRIG 1000ML 0.9% SOD CHL USP POUR PLAS BTL

## (undated) DEVICE — GAUZE,SPONGE,4"X4",8PLY,STRL,LF,10/TRAY: Brand: MEDLINE

## (undated) DEVICE — DRESSING PETRO W3XL8IN OIL EMUL N ADH GZ KNIT IMPREG CELOS

## (undated) DEVICE — SYRINGE IRRIG 60ML SFT PLIABLE BLB EZ TO GRP 1 HND USE W/

## (undated) DEVICE — CATHETER URET 5FR L70CM OPN END SGL LUMN INJ HUB FLEXIMA

## (undated) DEVICE — GAUZE,SPONGE,4"X4",16PLY,STRL,LF,10/TRAY: Brand: MEDLINE

## (undated) DEVICE — BANDAGE COMPR W6INXL15FT BGE E SGL LAYERED CLP CLSR

## (undated) DEVICE — CLOTH PREP W7.5XL7.5IN 2% CHG SKIN ALC AND RNS FREE

## (undated) DEVICE — TUBING SUCT 12FR MAL ALUM SHFT FN CAP VENT UNIV CONN W/ OBT

## (undated) DEVICE — BANDAGE COMPR W4INXL10YD WHITE/BEIGE E MTRX HK LOOP CLSR

## (undated) DEVICE — TIBURON EXTREMITY SHEET: Brand: CONVERTORS

## (undated) DEVICE — SMALL TEAR CROSS CUT RASP (11.0 X 5.0MM)